# Patient Record
Sex: MALE | Race: WHITE | Employment: OTHER | URBAN - METROPOLITAN AREA
[De-identification: names, ages, dates, MRNs, and addresses within clinical notes are randomized per-mention and may not be internally consistent; named-entity substitution may affect disease eponyms.]

---

## 2020-12-14 ENCOUNTER — HOSPITAL ENCOUNTER (OUTPATIENT)
Age: 75
Setting detail: SPECIMEN
Discharge: HOME | End: 2020-12-14
Payer: MEDICARE

## 2020-12-14 ENCOUNTER — OFFICE VISIT (OUTPATIENT)
Dept: FAMILY MEDICINE CLINIC | Age: 75
End: 2020-12-14
Payer: MEDICARE

## 2020-12-14 VITALS
SYSTOLIC BLOOD PRESSURE: 132 MMHG | DIASTOLIC BLOOD PRESSURE: 78 MMHG | TEMPERATURE: 97.5 F | HEIGHT: 71 IN | BODY MASS INDEX: 25.62 KG/M2 | WEIGHT: 183 LBS | OXYGEN SATURATION: 97 % | RESPIRATION RATE: 18 BRPM | HEART RATE: 75 BPM

## 2020-12-14 LAB
ABSOLUTE EOS #: 0.19 K/UL (ref 0–0.44)
ABSOLUTE IMMATURE GRANULOCYTE: 0.03 K/UL (ref 0–0.3)
ABSOLUTE LYMPH #: 1.42 K/UL (ref 1.1–3.7)
ABSOLUTE MONO #: 0.73 K/UL (ref 0.1–1.2)
ALBUMIN SERPL-MCNC: 4.1 G/DL (ref 3.5–5.2)
ALBUMIN/GLOBULIN RATIO: 1.4 (ref 1–2.5)
ALP BLD-CCNC: 68 U/L (ref 40–129)
ALT SERPL-CCNC: 12 U/L (ref 5–41)
ANION GAP SERPL CALCULATED.3IONS-SCNC: 9 MMOL/L (ref 9–17)
AST SERPL-CCNC: 13 U/L
BASOPHILS # BLD: 1 % (ref 0–2)
BASOPHILS ABSOLUTE: 0.07 K/UL (ref 0–0.2)
BILIRUB SERPL-MCNC: 0.52 MG/DL (ref 0.3–1.2)
BUN BLDV-MCNC: 21 MG/DL (ref 8–23)
BUN/CREAT BLD: ABNORMAL (ref 9–20)
C-PEPTIDE: 2.3 NG/ML (ref 1.1–4.4)
CALCIUM SERPL-MCNC: 9.7 MG/DL (ref 8.6–10.4)
CHLORIDE BLD-SCNC: 102 MMOL/L (ref 98–107)
CHOLESTEROL, FASTING: 168 MG/DL
CHOLESTEROL/HDL RATIO: 3.5
CO2: 27 MMOL/L (ref 20–31)
CREAT SERPL-MCNC: 0.89 MG/DL (ref 0.7–1.2)
DIFFERENTIAL TYPE: ABNORMAL
EOSINOPHILS RELATIVE PERCENT: 3 % (ref 1–4)
ESTIMATED AVERAGE GLUCOSE: 192 MG/DL
GFR AFRICAN AMERICAN: >60 ML/MIN
GFR NON-AFRICAN AMERICAN: >60 ML/MIN
GFR SERPL CREATININE-BSD FRML MDRD: ABNORMAL ML/MIN/{1.73_M2}
GFR SERPL CREATININE-BSD FRML MDRD: ABNORMAL ML/MIN/{1.73_M2}
GLUCOSE BLD-MCNC: 167 MG/DL (ref 70–99)
HAV IGM SER IA-ACNC: NONREACTIVE
HBA1C MFR BLD: 8.3 % (ref 4–6)
HCT VFR BLD CALC: 48.3 % (ref 40.7–50.3)
HDLC SERPL-MCNC: 48 MG/DL
HEMOGLOBIN: 15.1 G/DL (ref 13–17)
HEPATITIS B CORE IGM ANTIBODY: NONREACTIVE
HEPATITIS B SURFACE ANTIGEN: NONREACTIVE
HEPATITIS C ANTIBODY: NONREACTIVE
IMMATURE GRANULOCYTES: 1 %
LDL CHOLESTEROL: 106 MG/DL (ref 0–130)
LYMPHOCYTES # BLD: 22 % (ref 24–43)
MCH RBC QN AUTO: 30 PG (ref 25.2–33.5)
MCHC RBC AUTO-ENTMCNC: 31.3 G/DL (ref 28.4–34.8)
MCV RBC AUTO: 96 FL (ref 82.6–102.9)
MONOCYTES # BLD: 11 % (ref 3–12)
NRBC AUTOMATED: 0 PER 100 WBC
PDW BLD-RTO: 12.7 % (ref 11.8–14.4)
PLATELET # BLD: 275 K/UL (ref 138–453)
PLATELET ESTIMATE: ABNORMAL
PMV BLD AUTO: 11.1 FL (ref 8.1–13.5)
POTASSIUM SERPL-SCNC: 5.7 MMOL/L (ref 3.7–5.3)
PROSTATE SPECIFIC ANTIGEN: 4.18 UG/L
RBC # BLD: 5.03 M/UL (ref 4.21–5.77)
RBC # BLD: ABNORMAL 10*6/UL
SEG NEUTROPHILS: 62 % (ref 36–65)
SEGMENTED NEUTROPHILS ABSOLUTE COUNT: 4.15 K/UL (ref 1.5–8.1)
SODIUM BLD-SCNC: 138 MMOL/L (ref 135–144)
TOTAL PROTEIN: 7 G/DL (ref 6.4–8.3)
TRIGLYCERIDE, FASTING: 68 MG/DL
TSH SERPL DL<=0.05 MIU/L-ACNC: 1.32 MIU/L (ref 0.3–5)
VLDLC SERPL CALC-MCNC: NORMAL MG/DL (ref 1–30)
WBC # BLD: 6.6 K/UL (ref 3.5–11.3)
WBC # BLD: ABNORMAL 10*3/UL

## 2020-12-14 PROCEDURE — 3017F COLORECTAL CA SCREEN DOC REV: CPT | Performed by: INTERNAL MEDICINE

## 2020-12-14 PROCEDURE — 1036F TOBACCO NON-USER: CPT | Performed by: INTERNAL MEDICINE

## 2020-12-14 PROCEDURE — 4040F PNEUMOC VAC/ADMIN/RCVD: CPT | Performed by: INTERNAL MEDICINE

## 2020-12-14 PROCEDURE — G8417 CALC BMI ABV UP PARAM F/U: HCPCS | Performed by: INTERNAL MEDICINE

## 2020-12-14 PROCEDURE — G8427 DOCREV CUR MEDS BY ELIG CLIN: HCPCS | Performed by: INTERNAL MEDICINE

## 2020-12-14 PROCEDURE — 1123F ACP DISCUSS/DSCN MKR DOCD: CPT | Performed by: INTERNAL MEDICINE

## 2020-12-14 PROCEDURE — 3046F HEMOGLOBIN A1C LEVEL >9.0%: CPT | Performed by: INTERNAL MEDICINE

## 2020-12-14 PROCEDURE — 99203 OFFICE O/P NEW LOW 30 MIN: CPT | Performed by: INTERNAL MEDICINE

## 2020-12-14 PROCEDURE — 2022F DILAT RTA XM EVC RTNOPTHY: CPT | Performed by: INTERNAL MEDICINE

## 2020-12-14 PROCEDURE — G8482 FLU IMMUNIZE ORDER/ADMIN: HCPCS | Performed by: INTERNAL MEDICINE

## 2020-12-14 RX ORDER — SIMVASTATIN 20 MG
20 TABLET ORAL NIGHTLY
COMMUNITY
End: 2021-03-02 | Stop reason: SDUPTHER

## 2020-12-14 RX ORDER — PIOGLITAZONEHYDROCHLORIDE 45 MG/1
45 TABLET ORAL DAILY
COMMUNITY
End: 2020-12-29 | Stop reason: ALTCHOICE

## 2020-12-14 RX ORDER — CANAGLIFLOZIN 300 MG/1
300 TABLET, FILM COATED ORAL
COMMUNITY
End: 2020-12-29

## 2020-12-14 RX ORDER — GABAPENTIN 300 MG/1
300 CAPSULE ORAL 2 TIMES DAILY
COMMUNITY
End: 2021-03-02 | Stop reason: SDUPTHER

## 2020-12-14 ASSESSMENT — ENCOUNTER SYMPTOMS
VOMITING: 0
ANAL BLEEDING: 0
RECTAL PAIN: 0
BACK PAIN: 0
CHEST TIGHTNESS: 0
BLURRED VISION: 0
CONSTIPATION: 0
DIARRHEA: 0
VOICE CHANGE: 0
NAUSEA: 0
TROUBLE SWALLOWING: 0
BLOOD IN STOOL: 0
WHEEZING: 0
STRIDOR: 0
ABDOMINAL DISTENTION: 0
ABDOMINAL PAIN: 0
SHORTNESS OF BREATH: 0

## 2020-12-14 ASSESSMENT — PATIENT HEALTH QUESTIONNAIRE - PHQ9
SUM OF ALL RESPONSES TO PHQ9 QUESTIONS 1 & 2: 0
SUM OF ALL RESPONSES TO PHQ QUESTIONS 1-9: 0
2. FEELING DOWN, DEPRESSED OR HOPELESS: 0
SUM OF ALL RESPONSES TO PHQ QUESTIONS 1-9: 0
SUM OF ALL RESPONSES TO PHQ QUESTIONS 1-9: 0
1. LITTLE INTEREST OR PLEASURE IN DOING THINGS: 0

## 2020-12-14 NOTE — PROGRESS NOTES
Visit Information    Have you changed or started any medications since your last visit including any over-the-counter medicines, vitamins, or herbal medicines? no   Are you having any side effects from any of your medications? -  no  Have you stopped taking any of your medications? Is so, why? -  no    Have you seen any other physician or provider since your last visit? No  Have you had any other diagnostic tests since your last visit? No  Have you been seen in the emergency room and/or had an admission to a hospital since we last saw you? No  Have you had your routine dental cleaning in the past 6 months? no    Have you activated your [employer] account? If not, what are your barriers?  No:      Patient Care Team:  Gabriel Del Toro DO as PCP - General (Family Medicine)    Medical History Review  Past Medical, Family, and Social History reviewed and does contribute to the patient presenting condition    Health Maintenance   Topic Date Due    AAA screen  1945    Hepatitis C screen  1945    Lipid screen  08/09/1955    DTaP/Tdap/Td vaccine (1 - Tdap) 08/09/1964    Shingles Vaccine (1 of 2) 08/09/1995    Colon cancer screen colonoscopy  08/09/1995    Pneumococcal 65+ years Vaccine (2 of 2 - PPSV23) 08/09/2010    Flu vaccine (1) 09/01/2020    Hepatitis A vaccine  Aged Out    Hepatitis B vaccine  Aged Out    Hib vaccine  Aged Out    Meningococcal (ACWY) vaccine  Aged Out

## 2020-12-14 NOTE — PROGRESS NOTES
[facility]  [address]  [facility] [address] [address]  Dept: [phone]  Dept Fax: [phone]    Lit Miller a 76 y.o. male who presents today for his medical conditions/complaints as notedbelow. Kristy Lopez is c/o of   Chief Complaint   Patient presents with    New Patient     was seeing Dr. Reyes [facility] Diabetes     last A1C was with the [facility]     on back of neck. would like it checked    Results     discuss MRI done- on care everywhere in NOV    Weight Loss     235 about a year ago- has not tried to lose weight     HPI:     Here to establish care  Used to see dr. Jin Cool     Hx of DM, HTN   No hx of cva or mi   Is due for blood work  [employer] once a year to [facility] but does nto get routine care from them   Denies needing refills  No cardiac sxs  Takes meds daily    Has lost about 50 lbs in the last 6-9 months w/o trying  No change to diet  States eating about the same /appeitite did not decrease etc  Non of his meds are new in the last year or changes   No urinary sxs  No constitutional sxs; no gi sxs  Had cscope about 5 yrs ago and normal but need to get records   Previous pcp just told him to eat more protein per patient , he has been trying    Also had neck pain after fixing a light  Had mri cervical in nov 2020 and did nto get results  In care everywhere  Showed cervical stenosis to all levels severe at c2-c4 and disc bulge to some levels  He was given order /referral for ortho spine dr Nimco Johnson but could not keep appt  Pain does not affect adls, comes and goes  Only occasional neuropathy sxs  No weakness to arms   Last pcp also gave MR which does help a lot hwne he takes   Does not want any more referrals at this time     Diabetes  He presents for his initial diabetic visit. He has type 2 diabetes mellitus. His disease course has been stable. There are no hypoglycemic associated symptoms.  Pertinent negatives for hypoglycemia include no dizziness, headaches, nervousness/anxiousness, speech difficulty or tremors. Associated symptoms include weight loss. Pertinent negatives for diabetes include no blurred vision, no chest pain, no fatigue, no foot paresthesias, no foot ulcerations, no polydipsia, no polyphagia, no polyuria and no weakness. There are no hypoglycemic complications. Symptoms are stable. Diabetic complications include peripheral neuropathy. Pertinent negatives for diabetic complications include no impotence, nephropathy, PVD or retinopathy. Risk factors for coronary artery disease include male sex, obesity, family history, dyslipidemia, diabetes mellitus, sedentary lifestyle and hypertension. Current diabetic treatment includes oral agent (triple therapy). He is compliant with treatment most of the time. His weight is decreasing steadily. He is following a generally healthy diet. Meal planning includes avoidance of concentrated sweets. He has not had a previous visit with a dietitian. He rarely participates in exercise. His home blood glucose trend is fluctuating minimally. His breakfast blood glucose range is generally 130-140 mg/dl. His lunch blood glucose range is generally 130-140 mg/dl. His dinner blood glucose range is generally 130-140 mg/dl. An ACE inhibitor/angiotensin II receptor blocker is not being taken. He does not see a podiatrist.Eye exam is current.        No results found for: LABA1C      ( goal A1C is < 7)   No results found for: LABMICR  No results found for: LDLCHOLESTEROL, LDLCALC    (goal LDL is <100)   No results found for: AST, ALT, BUN  BP Readings from Last 3 Encounters:   12/14/20 132/78          (goal 120/80)    Past Medical History:   Diagnosis Date    Depression     Neuropathy       Past Surgical History:   Procedure Laterality Date    KNEE SURGERY         Family History   Problem Relation Age of Onset    Cancer Mother     Emphysema Father     Diabetes Brother     High Cholesterol Brother     Cancer Brother        Social History     Tobacco Use    Smoking status: Former Smoker     Packs/day: 1.00     Types: Cigarettes     Quit date:      Years since quittin.9    Smokeless tobacco: Never Used   Substance Use Topics    Alcohol use: Not Currently      Current Outpatient Medications   Medication Sig Dispense Refill    sitaGLIPtan-metFORMIN (JANUMET)  MG per tablet Janumet  MG Oral Tablet  TAKE 1 TABLET TWICE DAILY WITH MEALS. Refills: 0    Active      canagliflozin (INVOKANA) 300 MG TABS tablet Take 300 mg by mouth every morning (before breakfast)      pioglitazone (ACTOS) 45 MG tablet Take 45 mg by mouth daily      simvastatin (ZOCOR) 20 MG tablet Take 20 mg by mouth nightly      gabapentin (NEURONTIN) 300 MG capsule Take 300 mg by mouth 2 times daily. No current facility-administered medications for this visit. No Known Allergies       Health Maintenance   Topic Date Due    AAA screen  1945    Lipid screen  1955    DTaP/Tdap/Td vaccine (1 - Tdap) 1964    Shingles Vaccine (1 of 2) 1995    Colon cancer screen colonoscopy  1995    Flu vaccine  Completed    Pneumococcal 65+ years Vaccine  Completed    Hepatitis A vaccine  Aged Out    Hepatitis B vaccine  Aged Out    Hib vaccine  Aged Out    Meningococcal (ACWY) vaccine  Aged Out    Hepatitis C screen  Discontinued       Subjective:     Review of Systems   Constitutional: Positive for unexpected weight change and weight loss. Negative for activity change, appetite change, chills, diaphoresis, fatigue and fever. HENT: Negative for trouble swallowing and voice change. Eyes: Negative for blurred vision and visual disturbance. Respiratory: Negative for chest tightness, shortness of breath, wheezing and stridor. Cardiovascular: Negative for chest pain, palpitations and leg swelling.    Gastrointestinal: Negative for abdominal distention, abdominal pain, anal bleeding, blood in stool, constipation, diarrhea, nausea, rectal pain and vomiting. Endocrine: Negative for cold intolerance, heat intolerance, polydipsia, polyphagia and polyuria. Genitourinary: Negative for decreased urine volume, dysuria, frequency, genital sores, hematuria, impotence, testicular pain and urgency. Musculoskeletal: Positive for arthralgias, neck pain and neck stiffness. Negative for back pain, gait problem, joint swelling and myalgias. Skin: Negative for rash. Allergic/Immunologic: Negative for immunocompromised state. Neurological: Positive for numbness. Negative for dizziness, tremors, syncope, speech difficulty, weakness, light-headedness and headaches. Hematological: Negative for adenopathy. Does not bruise/bleed easily. Psychiatric/Behavioral: Negative for self-injury, sleep disturbance and suicidal ideas. The patient is not nervous/anxious. Objective:      Physical Exam  Vitals signs and nursing note reviewed. Constitutional:       General: He is not in acute distress. Appearance: Normal appearance. He is well-developed. He is not ill-appearing, toxic-appearing or diaphoretic. Comments:      HENT:      Head: Normocephalic and atraumatic. Right Ear: Tympanic membrane, ear canal and external ear normal. There is impacted cerumen. Left Ear: Tympanic membrane, ear canal and external ear normal. There is impacted cerumen. Mouth/Throat:      Mouth: Mucous membranes are dry. Eyes:      Extraocular Movements: Extraocular movements intact. Conjunctiva/sclera: Conjunctivae normal.      Pupils: Pupils are equal, round, and reactive to light. Neck:      Musculoskeletal: Normal range of motion and neck supple. No neck rigidity. Thyroid: No thyroid mass or thyroid tenderness. Vascular: No carotid bruit. Cardiovascular:      Rate and Rhythm: Normal rate and regular rhythm. No extrasystoles are present.      Pulses: Normal pulses. Heart sounds: Normal heart sounds, S1 normal and S2 normal. Heart sounds not distant. No murmur. Pulmonary:      Effort: Pulmonary effort is normal. No bradypnea, accessory muscle usage, prolonged expiration, respiratory distress or retractions. Breath sounds: Normal breath sounds and air entry. No stridor, decreased air movement or transmitted upper airway sounds. No decreased breath sounds, wheezing, rhonchi or rales. Abdominal:      General: Bowel sounds are normal. There is no distension. Palpations: Abdomen is soft. There is no hepatomegaly, splenomegaly or mass. Tenderness: There is no abdominal tenderness. There is no right CVA tenderness, left CVA tenderness, guarding or rebound. Hernia: No hernia is present. Musculoskeletal:      Right shoulder: He exhibits no tenderness, no bony tenderness, no pain, no spasm, normal pulse and normal strength. Left knee: He exhibits normal range of motion, no swelling, no effusion, no ecchymosis, no deformity, no laceration and no erythema. No tenderness found. Lumbar back: He exhibits spasm. He exhibits normal range of motion, no tenderness, no bony tenderness, no swelling, no edema, no deformity, no pain and normal pulse. Right lower leg: No edema. Left lower leg: No edema. Lymphadenopathy:      Cervical: No cervical adenopathy. Skin:     General: Skin is warm and dry. Capillary Refill: Capillary refill takes 2 to 3 seconds. Findings: No rash. Neurological:      General: No focal deficit present. Mental Status: He is alert and oriented to person, place, and time. Cranial Nerves: Cranial nerves are intact. No cranial nerve deficit. Sensory: Sensory deficit present. Motor: Motor function is intact. No weakness, atrophy or abnormal muscle tone. Coordination: Coordination is intact.  Coordination normal.      Gait: Gait normal.   Psychiatric:         Mood and Affect: Mood conversion to type 1 DM? Last a1c per pt was overall okay though he cannot remember number and has been awhile; no other DM sxs  Denied need for refills    Reviewed MRI  Continue current tx  Get records from old pcp including cscope     Pt will return for shave biopsy to posterior neck in 1-2 weeks for abnormal mold   Call with q/c  Seek immediate medical attention for any chest pain , severe trouble breathing and/or visual changes. Patientgiven educational materials - see patient instructions. Discussed use, benefit,and side effects of prescribed medications. All patient questions answered. Ptvoiced understanding. Reviewed health maintenance. Instructed to continue currentmedications, diet and exercise. Patient agreed with treatment plan. Follow up asdirected.      Electronically signed by Luis Alfredo Wood DO on 12/14/2020 at 11:23 AM

## 2020-12-15 LAB
THYROGLOBULIN AB: <20 IU/ML (ref 0–40)
THYROID PEROXIDASE (TPO) AB: <10 IU/ML (ref 0–35)

## 2020-12-29 ENCOUNTER — OFFICE VISIT (OUTPATIENT)
Dept: FAMILY MEDICINE CLINIC | Age: 75
End: 2020-12-29
Payer: MEDICARE

## 2020-12-29 VITALS
TEMPERATURE: 97.5 F | SYSTOLIC BLOOD PRESSURE: 120 MMHG | RESPIRATION RATE: 18 BRPM | OXYGEN SATURATION: 98 % | BODY MASS INDEX: 26.04 KG/M2 | DIASTOLIC BLOOD PRESSURE: 72 MMHG | HEIGHT: 71 IN | WEIGHT: 186 LBS | HEART RATE: 66 BPM

## 2020-12-29 DIAGNOSIS — R63.4 WEIGHT LOSS: ICD-10-CM

## 2020-12-29 DIAGNOSIS — Z23 NEED FOR PROPHYLACTIC VACCINATION AND INOCULATION AGAINST VARICELLA: ICD-10-CM

## 2020-12-29 DIAGNOSIS — Z91.81 AT HIGH RISK FOR FALLS: ICD-10-CM

## 2020-12-29 DIAGNOSIS — Z12.11 SCREENING FOR COLON CANCER: ICD-10-CM

## 2020-12-29 DIAGNOSIS — R35.1 BENIGN PROSTATIC HYPERPLASIA WITH NOCTURIA: ICD-10-CM

## 2020-12-29 DIAGNOSIS — L98.9 SKIN LESION: Primary | ICD-10-CM

## 2020-12-29 DIAGNOSIS — N40.1 BENIGN PROSTATIC HYPERPLASIA WITH NOCTURIA: ICD-10-CM

## 2020-12-29 DIAGNOSIS — E11.638 TYPE 2 DIABETES MELLITUS WITH OTHER ORAL COMPLICATION, WITHOUT LONG-TERM CURRENT USE OF INSULIN (HCC): ICD-10-CM

## 2020-12-29 PROCEDURE — 1123F ACP DISCUSS/DSCN MKR DOCD: CPT | Performed by: INTERNAL MEDICINE

## 2020-12-29 PROCEDURE — 1036F TOBACCO NON-USER: CPT | Performed by: INTERNAL MEDICINE

## 2020-12-29 PROCEDURE — 4040F PNEUMOC VAC/ADMIN/RCVD: CPT | Performed by: INTERNAL MEDICINE

## 2020-12-29 PROCEDURE — 3017F COLORECTAL CA SCREEN DOC REV: CPT | Performed by: INTERNAL MEDICINE

## 2020-12-29 PROCEDURE — G8482 FLU IMMUNIZE ORDER/ADMIN: HCPCS | Performed by: INTERNAL MEDICINE

## 2020-12-29 PROCEDURE — G8417 CALC BMI ABV UP PARAM F/U: HCPCS | Performed by: INTERNAL MEDICINE

## 2020-12-29 PROCEDURE — G8427 DOCREV CUR MEDS BY ELIG CLIN: HCPCS | Performed by: INTERNAL MEDICINE

## 2020-12-29 PROCEDURE — 2022F DILAT RTA XM EVC RTNOPTHY: CPT | Performed by: INTERNAL MEDICINE

## 2020-12-29 PROCEDURE — 11305 SHAVE SKIN LESION 0.5 CM/<: CPT | Performed by: INTERNAL MEDICINE

## 2020-12-29 PROCEDURE — 99214 OFFICE O/P EST MOD 30 MIN: CPT | Performed by: INTERNAL MEDICINE

## 2020-12-29 PROCEDURE — 3052F HG A1C>EQUAL 8.0%<EQUAL 9.0%: CPT | Performed by: INTERNAL MEDICINE

## 2020-12-29 RX ORDER — MELOXICAM 7.5 MG/1
7.5 TABLET ORAL DAILY
COMMUNITY
End: 2021-01-19 | Stop reason: SDUPTHER

## 2020-12-29 RX ORDER — GLIMEPIRIDE 2 MG/1
4 TABLET ORAL EVERY MORNING
Qty: 180 TABLET | Refills: 5 | Status: SHIPPED | OUTPATIENT
Start: 2020-12-29 | End: 2021-01-04 | Stop reason: SDUPTHER

## 2020-12-29 RX ORDER — TAMSULOSIN HYDROCHLORIDE 0.4 MG/1
0.4 CAPSULE ORAL DAILY
Qty: 90 CAPSULE | Refills: 11 | Status: SHIPPED | OUTPATIENT
Start: 2020-12-29 | End: 2021-01-04 | Stop reason: SDUPTHER

## 2020-12-29 NOTE — PROGRESS NOTES
Visit Information    Have you changed or started any medications since your last visit including any over-the-counter medicines, vitamins, or herbal medicines? no   Are you having any side effects from any of your medications? -  no  Have you stopped taking any of your medications? Is so, why? -  no    Have you seen any other physician or provider since your last visit? No  Have you had any other diagnostic tests since your last visit? No  Have you been seen in the emergency room and/or had an admission to a hospital since we last saw you? No  Have you had your routine dental cleaning in the past 6 months? no    Have you activated your [employer] account? If not, what are your barriers?  No:      Patient Care Team:  Anju Ann DO as PCP - General (Family Medicine)  Anju Ann [facility]CP [facility]    Medical History Review  Past Medical, Family, and Social History reviewed and does contribute to the patient presenting condition    Health Maintenance   Topic Date Due    AAA screen  1945    Diabetic foot exam  08/09/1955    Diabetic retinal exam  08/09/1955    DTaP/Tdap/Td vaccine (1 - Tdap) 08/09/1964    Shingles Vaccine (1 of 2) 08/09/1995    Colon cancer screen colonoscopy  08/09/1995    Annual Wellness Visit (AWV)  12/14/2020    A1C test (Diabetic or Prediabetic)  12/14/2021    Lipid screen  12/14/2021    PSA counseling  12/14/2021    Flu vaccine  Completed    Pneumococcal 65+ years Vaccine  Completed    Hepatitis A vaccine  Aged Out    Hib vaccine  Aged Out    Meningococcal (ACWY) vaccine  Aged Out    Hepatitis C screen  Discontinued

## 2021-01-02 ASSESSMENT — ENCOUNTER SYMPTOMS
VOMITING: 0
DIARRHEA: 0
COLOR CHANGE: 0
NAUSEA: 0
ABDOMINAL PAIN: 0
CONSTIPATION: 0

## 2021-01-02 NOTE — PROGRESS NOTES
[facility]  [address] [place] [address]  Dept: [phone]  Dept Fax: [phone]    Marilyn Barfield a 76 y.o. male who presents today for his medical conditions/complaints as notedbelow. Kristina Howell is c/o of   Chief Complaint   Patient presents with    Mole    Medication Refill       HPI:     Wound Check  He was originally treated more than 14 days ago. His temperature was unmeasured prior to arrival. The temperature was taken using an axillary reading. There has been no drainage from the wound. There is no redness present. There is no swelling present. There is no pain present. Benign Prostatic Hypertrophy  This is a recurrent problem. The current episode started more than 1 month ago. The problem has been gradually worsening since onset. Irritative symptoms include frequency and nocturia. Irritative symptoms do not include urgency. Obstructive symptoms include dribbling and incomplete emptying. Pertinent negatives include no chills, dysuria, genital pain, hematuria, hesitancy, nausea or vomiting. Past treatments include nothing. The treatment provided no relief.        Hemoglobin A1C (%)   Date Value   12/14/2020 8.3 (H)         ( goal A1C is < 7)   No results found for: LABMICR  LDL Cholesterol (mg/dL)   Date Value   12/14/2020 106       (goal LDL is <100)   AST (U/L)   Date Value   12/14/2020 13     ALT (U/L)   Date Value   12/14/2020 12     BUN (mg/dL)   Date Value   12/14/2020 21     BP Readings from Last 3 Encounters:   12/29/20 120/72   12/14/20 132/78          (goal 120/80)    Past Medical History:   Diagnosis Date    Depression     Neuropathy       Past Surgical History:   Procedure Laterality Date    KNEE SURGERY         Family History   Problem Relation Age of Onset    Cancer Mother     Emphysema Father     Diabetes Brother     High Cholesterol Brother     Cancer Brother        Social History     Tobacco Use    Smoking status: Former Smoker     Packs/day: 1.00     Types: Cigarettes     Quit date:      Years since quittin.0    Smokeless tobacco: Never Used   Substance Use Topics    Alcohol use: Not Currently      Current Outpatient Medications   Medication Sig Dispense Refill    meloxicam (MOBIC) 7.5 MG tablet Take 7.5 mg by mouth daily      zoster recombinant adjuvanted vaccine (SHINGRIX) 50 MCG/0.5ML SUSR injection 50 MCG IM then repeat 2-6 months. 0.5 mL 1    glimepiride (AMARYL) 2 MG tablet Take 2 tablets by mouth every morning 180 tablet 5    tamsulosin (FLOMAX) 0.4 MG capsule Take 1 capsule by mouth daily 90 capsule 11    sitaGLIPtan-metFORMIN (JANUMET)  MG per tablet Janumet  MG Oral Tablet TAKE 1 TABLET TWICE DAILY WITH MEALS. Refills: 0 Active 180 tablet 5    simvastatin (ZOCOR) 20 MG tablet Take 20 mg by mouth nightly      gabapentin (NEURONTIN) 300 MG capsule Take 300 mg by mouth 2 times daily. No current facility-administered medications for this visit. No Known Allergies       Health Maintenance   Topic Date Due    AAA screen  1945    Diabetic retinal exam  1955    DTaP/Tdap/Td vaccine (1 - Tdap) 1964    Shingles Vaccine (1 of 2) 1995    Colon cancer screen colonoscopy  1995    Annual Wellness Visit (AWV)  2020    A1C test (Diabetic or Prediabetic)  2021    Lipid screen  2021    PSA counseling  2021    Diabetic foot exam  2021    Flu vaccine  Completed    Pneumococcal 65+ years Vaccine  Completed    Hepatitis A vaccine  Aged Out    Hib vaccine  Aged Out    Meningococcal (ACWY) vaccine  Aged Out    Hepatitis C screen  Discontinued       Subjective:     Review of Systems   Constitutional: Positive for activity change and unexpected weight change. Negative for appetite change, chills, diaphoresis, fatigue and fever. Eyes: Negative for visual disturbance.    Cardiovascular: Negative for chest pain.   Gastrointestinal: Negative for abdominal pain, constipation, diarrhea, nausea and vomiting. Genitourinary: Positive for difficulty urinating, frequency, incomplete emptying and nocturia. Negative for decreased urine volume, dysuria, enuresis, flank pain, genital sores, hematuria, hesitancy, penile pain, penile swelling, scrotal swelling, testicular pain and urgency. Musculoskeletal: Negative for arthralgias. Skin: Positive for wound. Negative for color change, pallor and rash. Allergic/Immunologic: Positive for immunocompromised state. Neurological: Negative for headaches. Psychiatric/Behavioral: Negative for sleep disturbance. Objective:      Physical Exam  Vitals signs and nursing note reviewed. Constitutional:       General: He is not in acute distress. Appearance: Normal appearance. He is normal weight. He is not ill-appearing, toxic-appearing or diaphoretic. Comments: Chronically ill    HENT:      Head: Normocephalic and atraumatic. Neck:      Musculoskeletal: Normal range of motion and neck supple. Cardiovascular:      Rate and Rhythm: Normal rate and regular rhythm. Pulmonary:      Effort: Pulmonary effort is normal. No respiratory distress. Breath sounds: Normal breath sounds. No stridor. No wheezing or rhonchi. Skin:     General: Skin is warm and dry. Coloration: Skin is not ashen, cyanotic, jaundiced, mottled, pale or sallow. Findings: Lesion present. Nails: There is no clubbing. Neurological:      General: No focal deficit present. Mental Status: He is alert and oriented to person, place, and time. Cranial Nerves: No cranial nerve deficit. Psychiatric:         Mood and Affect: Mood normal.         Thought Content:  Thought content normal.     SUBJECTIVE:   Rachana Freire is a 76 y.o. male who presents for lesion removal. We have already discussed this procedure, including option of not performing surgery, technique of surgery and potential for scarring at a recent visit. OBJECTIVE:   Patient appears well. Vitals are normal.  Skin: itchy , brown spot , raised to posterior neck     ASSESSMENT:   normal complete skin exam, no suspicious lesions, actinic keratosis    PLAN:   After informed consent was obtained, using Betadine for cleansing and 1% Lidocaine without epinephrine for anesthetic, with sterile technique, shave excision was performed. Antibiotic dressing is applied, and wound care instructions provided. Be alert for any signs of cutaneous infection. The procedure was well tolerated without complications. Follow up: the specimen is labeled and sent to pathology for evaluation. /72   Pulse 66   Temp 97.5 °F (36.4 °C) (Temporal)   Resp 18   Ht 5' 11\" (1.803 m)   Wt 186 lb (84.4 kg)   SpO2 98%   BMI 25.94 kg/m²     Assessment:       Diagnosis Orders   1. Skin lesion  Surgical Pathology   2. Need for prophylactic vaccination and inoculation against varicella  zoster recombinant adjuvanted vaccine (SHINGRIX) 50 MCG/0.5ML SUSR injection   3. Type 2 diabetes mellitus with other oral complication, without long-term current use of insulin (HCC)  [facility] DIABETES FOOT EXAM   4. Screening for colon cancer  Cologuard             Plan:       Return in about 6 weeks (around 2/9/2021), or if symptoms worsen or fail to improve, for urination check . Orders Placed This Encounter   Procedures    Cologuard     This test is performed by an external laboratory and is used for result attachment only. It is required that this order requisition be faxed to: [facility] @@ [phone]. See [email or web address] for further information.      Standing Status:   Future     Standing Expiration Date:   12/29/2021   [facility]     Standing Status:   Future     Standing Expiration Date:   12/29/2021     Order Specific Question:   PREVIOUS BIOPSY     Answer:   No     Order Specific Question:   PREOP DIAGNOSIS     Answer: atypical mole     Order Specific Question:   FROZEN SECTION - NO OR YES/SPECIMEN     Answer:   No    HM DIABETES FOOT EXAM     Orders Placed This Encounter   Medications    zoster recombinant adjuvanted vaccine (SHINGRIX) 50 MCG/0.5ML SUSR injection     Si MCG IM then repeat 2-6 months. Dispense:  0.5 mL     Refill:  1    glimepiride (AMARYL) 2 MG tablet     Sig: Take 2 tablets by mouth every morning     Dispense:  180 tablet     Refill:  5    tamsulosin (FLOMAX) 0.4 MG capsule     Sig: Take 1 capsule by mouth daily     Dispense:  90 capsule     Refill:  11    sitaGLIPtan-metFORMIN (JANUMET)  MG per tablet     Sig: Janumet  MG Oral Tablet TAKE 1 TABLET TWICE DAILY WITH MEALS. Refills: 0 Active     Dispense:  180 tablet     Refill:  5    removal of skin lesion   Post procedure instructions provided will call with results     Trial flomax once daily  Reviewed SE  F/u in 4-6 weeks for med check   RECHECK PSA ( as borderline elevation) IN 2-3 MONTHS , RECHECK A1C AT THAT TIME AS WELL     Reviewed labs   Stop invokana   Pt was already off actos for months for DM  a1c was 8.6 percent  ?oissble cause of weight loss   Will restart amaryl at 4 mg daily   Reviewed SE  Monitor /keep log of sugars     For HM : colog[place]in[place], shingles vaccine   Call with q/c       Patientgiven educational materials - see patient instructions. Discussed use, benefit,and side effects of prescribed medications. All patient questions answered. Ptvoiced understanding. Reviewed health maintenance. Instructed to continue currentmedications, diet and exercise. Patient agreed with treatment plan. Follow up asdirected. Electronically signed by Boston Sorensen DO on 2021 at 4:45 PM  On the basis of positive falls risk screening, assessment and plan is as follows: home safety tips provided.

## 2021-01-04 RX ORDER — GLIMEPIRIDE 2 MG/1
4 TABLET ORAL EVERY MORNING
Qty: 180 TABLET | Refills: 5 | Status: SHIPPED | OUTPATIENT
Start: 2021-01-04 | End: 2021-06-28 | Stop reason: SDUPTHER

## 2021-01-04 RX ORDER — TAMSULOSIN HYDROCHLORIDE 0.4 MG/1
0.4 CAPSULE ORAL DAILY
Qty: 90 CAPSULE | Refills: 11 | Status: SHIPPED | OUTPATIENT
Start: 2021-01-04 | End: 2021-08-05 | Stop reason: SDUPTHER

## 2021-01-05 DIAGNOSIS — L98.9 SKIN LESION: ICD-10-CM

## 2021-01-12 ENCOUNTER — TELEPHONE (OUTPATIENT)
Dept: FAMILY MEDICINE CLINIC | Age: 76
End: 2021-01-12

## 2021-01-19 RX ORDER — MELOXICAM 7.5 MG/1
7.5 TABLET ORAL DAILY
Qty: 90 TABLET | Refills: 5 | Status: SHIPPED | OUTPATIENT
Start: 2021-01-19 | End: 2021-05-10 | Stop reason: SDUPTHER

## 2021-01-21 ENCOUNTER — NURSE TRIAGE (OUTPATIENT)
Dept: OTHER | Facility: CLINIC | Age: 76
End: 2021-01-21

## 2021-01-21 NOTE — TELEPHONE ENCOUNTER
Patient called pre-service center [facility]) [place] with red flag complaint. Brief description of triage: severe neck pain    Triage indicates for patient to see PCP today    Care advice provided, patient verbalizes understanding; denies any other questions or concerns; instructed to call back for any new or worsening symptoms.  was not allowed to make appointments for Dr. Agata Rader office and they were at lunch. Caller will call back for appointment after 13:00 today. Attention Provider: Thank you for allowing me to participate in the care of your patient. The patient was connected to triage in response to information provided to the [facility]. Please do not respond through this encounter as the response is not directed to a shared pool. Reason for Disposition   Patient wants to be seen    Answer Assessment - Initial Assessment Questions  1. ONSET: \"When did the pain begin? \"       About a month    2. LOCATION: \"Where does it hurt? \"       from the base of skull to shoulder attachment    3. PATTERN \"Does the pain come and go, or has it been constant since it started? \"       Constant    4. SEVERITY: \"How bad is the pain? \"  (Scale 1-10; or mild, moderate, severe)    - MILD (1-3): doesn't interfere with normal activities     - MODERATE (4-7): interferes with normal activities or awakens from sleep     - SEVERE (8-10):  excruciating pain, unable to do any normal activities       Severe 10     5. RADIATION: \"Does the pain go anywhere else, shoot into your arms? \"      Denies    6. CORD SYMPTOMS: \"Any weakness or numbness of the arms or legs? \"      Denies    7. CAUSE: \"What do you think is causing the neck pain? \"      Thinks the neck is misaligned    8. NECK OVERUSE: Mariire Natick recent activities that involved turning or twisting the neck? \"      Denies    9. OTHER SYMPTOMS: \"Do you have any other symptoms? \" (e.g., headache, fever, chest pain, difficulty breathing, neck swelling)      Headache from the severe pain    10. PREGNANCY: \"Is there any chance you are pregnant? \" \"When was your last menstrual period? \"        N/a    Protocols used: NECK PAIN OR STIFFNESS-ADULT-[place]

## 2021-03-02 DIAGNOSIS — M48.02 CERVICAL STENOSIS OF SPINAL CANAL: Primary | ICD-10-CM

## 2021-03-02 RX ORDER — SIMVASTATIN 20 MG
20 TABLET ORAL NIGHTLY
Qty: 30 TABLET | Refills: 0 | Status: SHIPPED | OUTPATIENT
Start: 2021-03-02 | End: 2021-04-13

## 2021-03-02 RX ORDER — GABAPENTIN 300 MG/1
300 CAPSULE ORAL 2 TIMES DAILY
Qty: 60 CAPSULE | Refills: 0 | Status: SHIPPED | OUTPATIENT
Start: 2021-03-02 | End: 2021-04-21 | Stop reason: SDUPTHER

## 2021-03-02 RX ORDER — LISINOPRIL 10 MG/1
10 TABLET ORAL DAILY
Qty: 30 TABLET | Refills: 0 | Status: SHIPPED | OUTPATIENT
Start: 2021-03-02 | End: 2021-08-27 | Stop reason: SDUPTHER

## 2021-03-02 RX ORDER — LISINOPRIL 10 MG/1
10 TABLET ORAL DAILY
COMMUNITY
End: 2021-03-02 | Stop reason: SDUPTHER

## 2021-03-08 ENCOUNTER — TELEPHONE (OUTPATIENT)
Dept: FAMILY MEDICINE CLINIC | Age: 76
End: 2021-03-08

## 2021-03-17 ENCOUNTER — VIRTUAL VISIT (OUTPATIENT)
Dept: FAMILY MEDICINE CLINIC | Age: 76
End: 2021-03-17
Payer: MEDICARE

## 2021-03-17 DIAGNOSIS — Z00.00 MEDICARE ANNUAL WELLNESS VISIT, INITIAL: Primary | ICD-10-CM

## 2021-03-17 PROCEDURE — G8482 FLU IMMUNIZE ORDER/ADMIN: HCPCS | Performed by: INTERNAL MEDICINE

## 2021-03-17 PROCEDURE — 4040F PNEUMOC VAC/ADMIN/RCVD: CPT | Performed by: INTERNAL MEDICINE

## 2021-03-17 PROCEDURE — G0438 PPPS, INITIAL VISIT: HCPCS | Performed by: INTERNAL MEDICINE

## 2021-03-17 PROCEDURE — 3017F COLORECTAL CA SCREEN DOC REV: CPT | Performed by: INTERNAL MEDICINE

## 2021-03-17 PROCEDURE — 1123F ACP DISCUSS/DSCN MKR DOCD: CPT | Performed by: INTERNAL MEDICINE

## 2021-03-17 ASSESSMENT — PATIENT HEALTH QUESTIONNAIRE - PHQ9
SUM OF ALL RESPONSES TO PHQ9 QUESTIONS 1 & 2: 0
1. LITTLE INTEREST OR PLEASURE IN DOING THINGS: 0
SUM OF ALL RESPONSES TO PHQ QUESTIONS 1-9: 0

## 2021-03-17 NOTE — PROGRESS NOTES
regularly involved in providing care):   Patient Care Team:  Faizan Nguyen DO as PCP - General (Family Medicine)  Faizan Nguyen DO as PCP - [facility] Empaneled Provider      The following problems were reviewed today and where indicated follow up appointments were made and/or referrals ordered.     Risk Factor Screenings with Interventions     Fall Risk:  2 or more falls in past year?: no  Fall with injury in past year?: no  Fall Risk Interventions:    · NA    Depression:  PHQ-2 Score: 0  Depression Interventions:  · NA    Anxiety:     Anxiety Interventions:  · NA    Cognitive:     Cognitive Impairment Interventions:  · NA    Substance Abuse:  Social History     Socioeconomic History    Marital status:      Spouse name: Not on file    Number of children: Not on file    Years of education: Not on file    Highest education level: Not on file   Occupational History    Not on file   Social Needs    Financial resource strain: Not on file    Food insecurity     Worry: Not on file     Inability: Not on file   FonJax needs     Medical: Not on file     Non-medical: Not on file   Tobacco Use    Smoking status: Former Smoker     Packs/day: 1.00     Years: 15.00     Pack years: 15.00     Types: Cigarettes     Start date: 1972     Quit date:      Years since quittin.2    Smokeless tobacco: Never Used   Substance and Sexual Activity    Alcohol use: Not Currently    Drug use: Never    Sexual activity: Not Currently   Lifestyle    Physical activity     Days per week: Not on file     Minutes per session: Not on file    Stress: Not on file   Relationships    Social connections     Talks on phone: Not on file     Gets together: Not on file     Attends Anglican service: Not on file     Active member of club or organization: Not on file     Attends meetings of clubs or organizations: Not on file     Relationship status: Not on file    Intimate partner violence     Fear of current or ex partner: Not on file     Emotionally abused: Not on file     Physically abused: Not on file     Forced sexual activity: Not on file   Other Topics Concern    Not on file   Social History Narrative    Not on file     Audit Questionnaire: Screen for Alcohol Misuse  How often do you have a drink containing alcohol?: Never  Substance Abuse Interventions:  · NA    Health Risk Assessment:     General  In general, how would you say your health is?: Good  In the past 7 days, have you experienced any of the following? New or Increased Pain, New or Increased Fatigue, Loneliness, Social Isolation, Stress or Anger?: None of These  Do you get the social and emotional support that you need?: Yes  Do you have a Living Will?: (!) No  General Health Risk Interventions:  · No Living Will: Patient declines ACP discussion/assistance  · NA    Health Habits/Nutrition  Do you exercise for at least 20 minutes 2-3 times per week?: Yes  Have you lost any weight without trying in the past 3 months?: No  Do you eat only one meal per day?: No  Have you seen the dentist within the past year?: N/A - wear dentures  There is no height or weight on file to calculate BMI.   Health Habits/Nutrition Interventions:  · NA    Hearing/Vision  Do you or your family notice any trouble with your hearing that hasn't been managed with hearing aids?: (!) Yes  Do you have difficulty driving, watching TV, or doing any of your daily activities because of your eyesight?: No  Have you had an eye exam within the past year?: Yes  Hearing/Vision Interventions:  · Hearing concerns:  PT HAS HEARING AIDS , neeeds new ones     Safety  Do you have working smoke detectors?: Yes  Have all throw rugs been removed or fastened?: Yes  Do you have non-slip mats or surfaces in all bathtubs/showers?: Yes  Do all of your stairways have a railing or banister?: Yes  Are your doorways, halls and stairs free of clutter?: Yes  Do you always fasten your seatbelt when you are in a car?: Yes  Safety Interventions:  · Home safety tips provided    ADLs  In the past 7 days, did you need help from others to perform any of the following everyday activities? Eating, dressing, grooming, bathing, toileting, or walking/balance?: None  In the past 7 days, did you need help from others to take care of any of the following?  Laundry, housekeeping, banking/finances, shopping, telephone use, food preparation, transportation, or taking medications?: None  ADL Interventions:  · Patient declines any further evaluation/treatment for this issue    Personalized Preventive Plan   Current Health Maintenance Status  Immunization History   Administered Date(s) Administered    COVID-19, Moderna, PF, 100mcg/0.5mL 01/29/2021, 02/26/2021    Influenza A ([id number]) Vaccine PF IM 01/06/2010    Influenza Virus Vaccine 09/16/2009, 12/08/2010, 01/16/2012, 10/18/2012, 11/18/2013, 11/22/2015, 10/01/2016, 10/10/2019, 10/01/2020    Influenza Whole 10/25/2008, 09/16/2009    Influenza, High Dose (Fluzone 65 yrs and older) 10/23/2014, 12/13/2016, 10/26/2017, 11/08/2018, 12/01/2020    Influenza, High-dose, Karol Saldivar, 65 yrs +, IM (Fluzone) 11/30/2020    Influenza, Triv, 3 Years and older, IM (Afluria (5 yrs and older) 10/18/2012, 11/18/2013    Pneumococcal Conjugate 13-valent ([id number]) 03/11/2015    Pneumococcal Polysaccharide ([id number]) 12/14/2015, 10/26/2017        Health Maintenance   Topic Date Due    AAA screen  Never done    Diabetic retinal exam  Never done    DTaP/Tdap/Td vaccine (1 - Tdap) Never done    Shingles Vaccine (1 of 2) Never done    Colon cancer screen colonoscopy  Never done    Annual Wellness Visit (AWV)  Never done    A1C test (Diabetic or Prediabetic)  12/14/2021    Lipid screen  12/14/2021    Potassium monitoring  12/14/2021    Creatinine monitoring  12/14/2021    PSA counseling  12/14/2021    Diabetic foot exam  12/29/2021    Flu vaccine  Completed    Pneumococcal 65+ years Vaccine  Completed    COVID-19 Vaccine  Completed    Hepatitis A vaccine  Aged Out    Hib vaccine  Aged Out    Meningococcal (ACWY) vaccine  Aged Out    Hepatitis C screen  Discontinued       Recommendations for Preventive Services Due: see orders.   Recommended screening schedule for the next 5-10 years is provided to the patient in written form: see Patient Instructions/AVS.

## 2021-04-13 RX ORDER — SIMVASTATIN 20 MG
TABLET ORAL
Qty: 30 TABLET | Refills: 11 | Status: SHIPPED | OUTPATIENT
Start: 2021-04-13 | End: 2021-07-10 | Stop reason: SDUPTHER

## 2021-04-21 DIAGNOSIS — M48.02 CERVICAL STENOSIS OF SPINAL CANAL: ICD-10-CM

## 2021-04-21 RX ORDER — GABAPENTIN 300 MG/1
300 CAPSULE ORAL 2 TIMES DAILY
Qty: 180 CAPSULE | Refills: 5 | Status: SHIPPED | OUTPATIENT
Start: 2021-04-21 | End: 2022-04-29

## 2021-05-10 RX ORDER — MELOXICAM 7.5 MG/1
7.5 TABLET ORAL DAILY
Qty: 90 TABLET | Refills: 5 | Status: SHIPPED | OUTPATIENT
Start: 2021-05-10 | End: 2021-08-05 | Stop reason: SDUPTHER

## 2021-06-07 ENCOUNTER — OFFICE VISIT (OUTPATIENT)
Dept: FAMILY MEDICINE CLINIC | Age: 76
End: 2021-06-07
Payer: MEDICARE

## 2021-06-07 VITALS
HEIGHT: 71 IN | OXYGEN SATURATION: 96 % | DIASTOLIC BLOOD PRESSURE: 70 MMHG | BODY MASS INDEX: 26.46 KG/M2 | WEIGHT: 189 LBS | HEART RATE: 66 BPM | RESPIRATION RATE: 18 BRPM | SYSTOLIC BLOOD PRESSURE: 116 MMHG

## 2021-06-07 DIAGNOSIS — E78.5 HYPERLIPIDEMIA, UNSPECIFIED HYPERLIPIDEMIA TYPE: ICD-10-CM

## 2021-06-07 DIAGNOSIS — E11.9 TYPE 2 DIABETES MELLITUS WITHOUT COMPLICATION, WITHOUT LONG-TERM CURRENT USE OF INSULIN (HCC): ICD-10-CM

## 2021-06-07 DIAGNOSIS — R41.3 MEMORY LOSS: Primary | ICD-10-CM

## 2021-06-07 DIAGNOSIS — I10 HYPERTENSION, UNSPECIFIED TYPE: ICD-10-CM

## 2021-06-07 PROBLEM — M15.9 GENERALIZED OSTEOARTHRITIS: Status: ACTIVE | Noted: 2021-06-07

## 2021-06-07 LAB — HBA1C MFR BLD: 6.2 %

## 2021-06-07 PROCEDURE — 83036 HEMOGLOBIN GLYCOSYLATED A1C: CPT | Performed by: NURSE PRACTITIONER

## 2021-06-07 PROCEDURE — 1036F TOBACCO NON-USER: CPT | Performed by: NURSE PRACTITIONER

## 2021-06-07 PROCEDURE — G8417 CALC BMI ABV UP PARAM F/U: HCPCS | Performed by: NURSE PRACTITIONER

## 2021-06-07 PROCEDURE — 1123F ACP DISCUSS/DSCN MKR DOCD: CPT | Performed by: NURSE PRACTITIONER

## 2021-06-07 PROCEDURE — 2022F DILAT RTA XM EVC RTNOPTHY: CPT | Performed by: NURSE PRACTITIONER

## 2021-06-07 PROCEDURE — 99214 OFFICE O/P EST MOD 30 MIN: CPT | Performed by: NURSE PRACTITIONER

## 2021-06-07 PROCEDURE — 4040F PNEUMOC VAC/ADMIN/RCVD: CPT | Performed by: NURSE PRACTITIONER

## 2021-06-07 PROCEDURE — G8427 DOCREV CUR MEDS BY ELIG CLIN: HCPCS | Performed by: NURSE PRACTITIONER

## 2021-06-07 PROCEDURE — 3044F HG A1C LEVEL LT 7.0%: CPT | Performed by: NURSE PRACTITIONER

## 2021-06-07 PROCEDURE — 3017F COLORECTAL CA SCREEN DOC REV: CPT | Performed by: NURSE PRACTITIONER

## 2021-06-07 RX ORDER — MEMANTINE HYDROCHLORIDE 5 MG/1
5 TABLET ORAL DAILY
Qty: 30 TABLET | Refills: 1 | Status: SHIPPED | OUTPATIENT
Start: 2021-06-07 | End: 2021-08-05 | Stop reason: SDUPTHER

## 2021-06-07 ASSESSMENT — ENCOUNTER SYMPTOMS
ABDOMINAL PAIN: 0
SHORTNESS OF BREATH: 0
SINUS PAIN: 0
COUGH: 0
DIARRHEA: 0
VOMITING: 0
BACK PAIN: 0
EYE PAIN: 0
SORE THROAT: 0
NAUSEA: 0

## 2021-06-07 NOTE — PATIENT INSTRUCTIONS
Patient Education        Noninsulin Medicines for Type 2 Diabetes: Care Instructions  Overview     There are different types of noninsulin medicines for diabetes. Each works in a different way. But they all help you control your blood sugar. Some types help your body make insulin to lower your blood sugar. Others lower how much insulin your body needs. Some can slow how fast your body digests sugars. And some can remove extra glucose through your urine. You may need to take more than one medicine for diabetes. Two or more medicines may work better to lower your blood sugar level than just one does. · Metformin. This lowers how much glucose your liver makes. And it helps you respond better to insulin. It also lowers the amount of stored sugar that your liver releases when you are not eating. · Sulfonylureas. These help your body release more insulin. Some work for many hours. They can cause low blood sugar if you don't eat as you planned. An example is glipizide. · Thiazolidinediones. These reduce the amount of blood glucose. They also help you respond better to insulin. An example is pioglitazone. · SGLT2 inhibitors. These help to remove extra glucose through your urine. They may also help some people lose weight. An example is ertugliflozin. · DPP-4 inhibitors. These help your body raise the level of insulin after you eat. They also help your body make less of a hormone that raises blood sugar. An example is alogliptin. · Incretin hormones (GLP-1 receptor agonists). These help your body make a protein that can raise your insulin level and make you less hungry. They're given as shots or pills. An example is semaglutide. · Meglitinides. These help your body release insulin. They also help slow how your body digests sugars. So they can keep your blood sugar from rising too fast after you eat. · Alpha-glucosidase inhibitors. These keep starches from breaking down.  This means that they lower the amount of glucose absorbed when you eat. They don't help your body make more insulin. So they will not cause low blood sugar unless you use them with other medicines for diabetes. Follow-up care is a key part of your treatment and safety. Be sure to make and go to all appointments, and call your doctor if you are having problems. It's also a good idea to know your test results and keep a list of the medicines you take. How can you care for yourself at home? · Eat a healthy diet. Get some exercise each day. This may help you to reduce how much medicine you need. · Do not take other prescription or over-the-counter medicines, vitamins, herbal products, or supplements without talking to your doctor first. Some medicines for type 2 diabetes can cause problems with other medicines or supplements. · Tell your doctor if you plan to get pregnant. Some of these drugs are not safe for pregnant women. · Be safe with medicines. Take your medicines exactly as prescribed. Meglitinides and sulfonylureas can cause your blood sugar to drop very low. Call your doctor if you think you are having a problem with your medicine. · Check your blood sugar often. You can use a glucose monitor. Keeping track can help you know how certain foods, activities, and medicines affect your blood sugar. And it can help you keep your blood sugar from getting so low that it's not safe. When should you call for help? Call 911 anytime you think you may need emergency care. For example, call if:    · You passed out (lost consciousness).     · You are confused or cannot think clearly.     · Your blood sugar is very high or very low. Watch closely for changes in your health, and be sure to contact your doctor if:    · Your blood sugar stays outside the level your doctor set for you.     · You have any problems. Where can you learn more? Go to [email or web address] and sign in to your [employer] account.  Enter H153 in the [address] Information box to learn more about \"Noninsulin Medicines for Type 2 Diabetes: Care Instructions. \"     If you do not have an account, please click on the \"Sign Up Now\" link. Current as of: August 31, 2020               Content Version: 12.8  © 3461-3195 Healthwise, Incorporated. Care instructions adapted under license by [facility] ([facility]). If you have questions about a medical condition or this instruction, always ask your healthcare professional. [address] any warranty or liability for your use of this information.

## 2021-06-07 NOTE — PROGRESS NOTES
[facility]  [address]  [address] [address]  Dept: [phone]  Dept Fax: [phone]    Elo Grubbs is a 76 y.o. male who presents today for his medicalconditions/complaints as noted below. Elo Grubbs is c/o of Established New Doctor (previous [facility] pt), Memory Loss (wants to see if there is meds), Nail Problem (nail fell off about a week ago), Health Maintenance (colonscopy 2015- [facility] not in care everywhere), Medication Refill (needs 90 day supply), and Diabetes (due for a1c)      HPI:           61-year-old male patient presents with complaints of encounter to establish care. Current complaints include progressive memory decline. Reports that for the past several years he has noticed slightly worsening memory. Reports that over the past 5 months this has worsened significantly. Reports that his wife will tell him to go to the store and by the time you the story he forgot what he was going there for. Reports that he had a nail that fell off of the left second toe. Reports the nail was abnormally thickened and was unable to be trimmed and did fall off. History of type 2 diabetes takes Janumet, glimepiride. Last A1c 8.3    History hypertension takes lisinopril 10 mg    History hyperlipidemia takes Zocor 20 mg    History of generalized arthritis and neuropathy takes gabapentin 300 twice daily and meloxicam.      Past Medical History:   Diagnosis Date    Depression     Diabetes mellitus (HCC)     Hypertension     Neuropathy         Current Outpatient Medications   Medication Sig Dispense Refill    memantine (NAMENDA) 5 MG tablet Take 1 tablet by mouth daily 30 tablet 1    meloxicam (MOBIC) 7.5 MG tablet Take 1 tablet by mouth daily 90 tablet 5    gabapentin (NEURONTIN) 300 MG capsule Take 1 capsule by mouth 2 times daily for 30 days.  180 capsule 5    simvastatin (ZOCOR) 20 MG tablet TAKE 1 TABLET NIGHTLY 30 tablet 11    lisinopril (PRINIVIL;ZESTRIL) 10 MG tablet Take 1 tablet by mouth daily 30 tablet 0    sitaGLIPtan-metFORMIN (JANUMET)  MG per tablet Janumet  MG Oral Tablet TAKE 1 TABLET TWICE DAILY WITH MEALS. Refills: 0 Active 180 tablet 5    glimepiride (AMARYL) 2 MG tablet Take 2 tablets by mouth every morning 180 tablet 5    tamsulosin (FLOMAX) 0.4 MG capsule Take 1 capsule by mouth daily 90 capsule 11    zoster recombinant adjuvanted vaccine (SHINGRIX) 50 MCG/0.5ML SUSR injection 50 MCG IM then repeat 2-6 months. 0.5 mL 1     No current facility-administered medications for this visit. No Known Allergies    Subjective:      Review of Systems   Constitutional: Negative for chills and fatigue. HENT: Negative for congestion, ear pain, sinus pain and sore throat. Eyes: Negative for pain and visual disturbance. Respiratory: Negative for cough and shortness of breath. Cardiovascular: Negative for chest pain and palpitations. Gastrointestinal: Negative for abdominal pain, diarrhea, nausea and vomiting. Genitourinary: Negative for penile pain and testicular pain. Musculoskeletal: Negative for back pain, joint swelling and neck pain. Skin: Negative for rash. Neurological: Negative for dizziness and light-headedness. Hematological: Does not bruise/bleed easily. Psychiatric/Behavioral: Positive for decreased concentration. All other systems reviewed and are negative.      :Objective     Physical Exam  Vitals and nursing note reviewed. Constitutional:       General: He is not in acute distress. Appearance: Normal appearance. He is not toxic-appearing. HENT:      Mouth/Throat:      Mouth: Mucous membranes are moist.   Cardiovascular:      Rate and Rhythm: Normal rate. Pulmonary:      Effort: Pulmonary effort is normal. No respiratory distress. Breath sounds: Normal breath sounds. Musculoskeletal:        Feet:    Skin:     General: Skin is warm and dry. Neurological:      General: No focal deficit present. Mental Status: He is alert and oriented to person, place, and time.        /70   Pulse 66   Resp 18   Ht 5' 11\" (1.803 m)   Wt 189 lb (85.7 kg)   SpO2 96%   BMI 26.36 kg/m²     Lab Review   Hospital Outpatient Visit on 12/14/2020   Component Date Value    Hepatitis B Surface Ag 12/14/2020 NONREACTIVE     Hepatitis C Ab 12/14/2020 NONREACTIVE     Hep B Core Ab, IgM 12/14/2020 NONREACTIVE     Hep A IgM 12/14/2020 NONREACTIVE     C-Peptide 12/14/2020 2.3     Thyroid Peroxidase (Tpo)* 12/14/2020 <10.0     Thyroglobulin Ab 12/14/2020 <20.0     TSH 12/14/2020 1.32     PSA 12/14/2020 4.18*    Hemoglobin A1C 12/14/2020 8.3*    Estimated Avg Glucose 12/14/2020 192     Glucose 12/14/2020 167*    BUN 12/14/2020 21     CREATININE 12/14/2020 0.89     Bun/Cre Ratio 12/14/2020 NOT REPORTED     Calcium 12/14/2020 9.7     Sodium 12/14/2020 138     Potassium 12/14/2020 5.7*    Chloride 12/14/2020 102     CO2 12/14/2020 27     Anion Gap 12/14/2020 9     Alkaline Phosphatase 12/14/2020 68     ALT 12/14/2020 12     AST 12/14/2020 13     Total Bilirubin 12/14/2020 0.52     Total Protein 12/14/2020 7.0     Albumin 12/14/2020 4.1     Albumin/Globulin Ratio 12/14/2020 1.4     GFR Non- 12/14/2020 >60     GFR  12/14/2020 >60     GFR Comment 12/14/2020          GFR Staging 12/14/2020 NOT REPORTED     WBC 12/14/2020 6.6     RBC 12/14/2020 5.03     Hemoglobin 12/14/2020 15.1     Hematocrit 12/14/2020 48.3     MCV 12/14/2020 96.0     MCH 12/14/2020 30.0     MCHC 12/14/2020 31.3     RDW 12/14/2020 12.7     Platelets 94/93/8652 275     MPV 12/14/2020 11.1     NRBC Automated 12/14/2020 0.0     Differential Type 12/14/2020 NOT REPORTED     Seg Neutrophils 12/14/2020 62     Lymphocytes 12/14/2020 22*    Monocytes 12/14/2020 11     Eosinophils % 12/14/2020 3     Basophils 12/14/2020 1     Immature Granulocytes 12/14/2020 1*    Segs Absolute 12/14/2020 4.15     Absolute Lymph # 12/14/2020 1.42     Absolute Mono # 12/14/2020 0.73     Absolute Eos # 12/14/2020 0.19     Basophils Absolute 12/14/2020 0.07     Absolute Immature Granul* 12/14/2020 0.03     WBC Morphology 12/14/2020 NOT REPORTED     RBC Morphology 12/14/2020 NOT REPORTED     Platelet Estimate 54/38/5705 NOT REPORTED     Cholesterol, Fasting 12/14/2020 168     HDL 12/14/2020 48     LDL Cholesterol 12/14/2020 106     Chol/HDL Ratio 12/14/2020 3.5     Triglyceride, Fasting 12/14/2020 68     VLDL 12/14/2020 NOT REPORTED        Assessment and Plan      1. Memory loss  Assessment & Plan:   Trial namenda, refer to neuro placed  Orders:  -     memantine (NAMENDA) 5 MG tablet; Take 1 tablet by mouth daily, [facility]30 tablet[facility] [address], [facility], Neurology, [facility]  2. Hypertension, unspecified type  Assessment & Plan:   Well-controlled, continue current medications  Orders:  -     CBC With Auto Differential; Future  -     Comprehensive Metabolic Panel; Future  3. Hyperlipidemia, unspecified hyperlipidemia type  Assessment & Plan:   Well-controlled, continue current medications  Orders:  -     Lipid Panel; Future  4. Type 2 diabetes mellitus without complication, without long-term current use of insulin (HCC)  Assessment & Plan:   Well-controlled, continue current medications   A1c improved   Orders:  -     POCT glycosylated hemoglobin (Hb A1C)                 Results for orders placed or performed in visit on 06/07/21   POCT glycosylated hemoglobin (Hb A1C)   Result Value Ref Range    Hemoglobin A1C 6.2 %             Return in about 3 months (around 9/7/2021), or if symptoms worsen or fail to improve.         Orders Placed This Encounter   Medications    memantine (NAMENDA) 5 MG tablet     Sig: Take 1 tablet by mouth daily     Dispense:  30 tablet     Refill:  1        Patient given educational materials - see patient instructions. Discussed use, benefit, and side effects of prescribed medications. All patientquestions answered. Pt voiced understanding. Patient given educational materials - see patient instructions. Discussed use, benefit, and side effects of prescribed medications. All patientquestions answered. Pt voiced understanding. This note was transcribed using dictation with Dragon services. Efforts were made to correct any errors but some words may be misinterpreted.     Electronically signed by CRISTINA Godfrey CNP on 6/7/2021at 12:41 PM

## 2021-06-07 NOTE — PROGRESS NOTES
Visit Information    Have you changed or started any medications since your last visit including any over-the-counter medicines, vitamins, or herbal medicines? no   Are you having any side effects from any of your medications? -  no  Have you stopped taking any of your medications? Is so, why? -  no    Have you seen any other physician or provider since your last visit? No  Have you had any other diagnostic tests since your last visit? No  Have you been seen in the emergency room and/or had an admission to a hospital since we last saw you? No  Have you had your routine dental cleaning in the past 6 months? no    Have you activated your [employer] account? If not, what are your barriers?  No:      Patient Care Team:  Riri Luna DO as PCP - General (Family Medicine)  Riri Luna DO as PCP - [facility] Provider    Medical History Review  Past Medical, Family, and Social History reviewed and does contribute to the patient presenting condition    Health Maintenance   Topic Date Due    AAA screen  Never done    Diabetic retinal exam  Never done    DTaP/Tdap/Td vaccine (1 - Tdap) Never done    Shingles Vaccine (1 of 2) Never done    Colon cancer screen colonoscopy  Never done    A1C test (Diabetic or Prediabetic)  12/14/2021    Lipid screen  12/14/2021    Potassium monitoring  12/14/2021    Creatinine monitoring  12/14/2021    PSA counseling  12/14/2021    Diabetic foot exam  12/29/2021    Annual Wellness Visit (AWV)  03/18/2022    Flu vaccine  Completed    Pneumococcal 65+ years Vaccine  Completed    COVID-19 Vaccine  Completed    Hepatitis A vaccine  Aged Out    Hib vaccine  Aged Out    Meningococcal (ACWY) vaccine  Aged Out    Hepatitis C screen  Discontinued

## 2021-06-28 ENCOUNTER — TELEPHONE (OUTPATIENT)
Dept: FAMILY MEDICINE CLINIC | Age: 76
End: 2021-06-28

## 2021-06-28 DIAGNOSIS — E11.9 TYPE 2 DIABETES MELLITUS WITHOUT COMPLICATION, WITHOUT LONG-TERM CURRENT USE OF INSULIN (HCC): Primary | ICD-10-CM

## 2021-06-28 RX ORDER — GLIMEPIRIDE 2 MG/1
4 TABLET ORAL EVERY MORNING
Qty: 180 TABLET | Refills: 5 | Status: SHIPPED | OUTPATIENT
Start: 2021-06-28 | End: 2022-08-01

## 2021-06-28 NOTE — TELEPHONE ENCOUNTER
----- Message from Louie Liu sent at 6/28/2021 10:57 AM EDT -----  Subject: Refill Request    QUESTIONS  Name of Medication? glimepiride (AMARYL) 2 MG tablet  Patient-reported dosage and instructions? once a day   How many days do you have left? 0  Preferred Pharmacy? [address] phone number (if available)? [phone]  Additional Information for Provider? 90 day supply he needs and only got   30 last time and now is out of them. needs asap let patient know about   this   ---------------------------------------------------------------------------  --------------  CALL BACK INFO  What is the best way for the office to contact you? OK to leave message on   voicemail  Preferred Call Back Phone Number?  [phone]

## 2021-07-12 RX ORDER — SIMVASTATIN 20 MG
TABLET ORAL
Qty: 30 TABLET | Refills: 0 | Status: SHIPPED | OUTPATIENT
Start: 2021-07-12 | End: 2021-07-19 | Stop reason: SDUPTHER

## 2021-07-19 DIAGNOSIS — Z76.0 MEDICATION REFILL: Primary | ICD-10-CM

## 2021-07-19 RX ORDER — SIMVASTATIN 20 MG
TABLET ORAL
Qty: 90 TABLET | Refills: 1 | Status: SHIPPED | OUTPATIENT
Start: 2021-07-19 | End: 2022-03-03

## 2021-08-05 DIAGNOSIS — R41.3 MEMORY LOSS: ICD-10-CM

## 2021-08-05 RX ORDER — TAMSULOSIN HYDROCHLORIDE 0.4 MG/1
0.4 CAPSULE ORAL DAILY
Qty: 90 CAPSULE | Refills: 11 | Status: SHIPPED | OUTPATIENT
Start: 2021-08-05 | End: 2022-08-12

## 2021-08-05 RX ORDER — MEMANTINE HYDROCHLORIDE 5 MG/1
5 TABLET ORAL DAILY
Qty: 30 TABLET | Refills: 1 | Status: SHIPPED | OUTPATIENT
Start: 2021-08-05 | End: 2021-09-10 | Stop reason: SDUPTHER

## 2021-08-05 RX ORDER — MELOXICAM 7.5 MG/1
7.5 TABLET ORAL DAILY
Qty: 90 TABLET | Refills: 5 | Status: SHIPPED | OUTPATIENT
Start: 2021-08-05

## 2021-08-27 RX ORDER — LISINOPRIL 10 MG/1
10 TABLET ORAL DAILY
Qty: 90 TABLET | Refills: 1 | Status: SHIPPED | OUTPATIENT
Start: 2021-08-27 | End: 2021-09-20

## 2021-09-10 ENCOUNTER — OFFICE VISIT (OUTPATIENT)
Dept: FAMILY MEDICINE CLINIC | Age: 76
End: 2021-09-10
Payer: MEDICARE

## 2021-09-10 ENCOUNTER — HOSPITAL ENCOUNTER (OUTPATIENT)
Age: 76
Setting detail: SPECIMEN
Discharge: HOME | End: 2021-09-10
Payer: MEDICARE

## 2021-09-10 VITALS
BODY MASS INDEX: 27.44 KG/M2 | DIASTOLIC BLOOD PRESSURE: 78 MMHG | HEIGHT: 71 IN | OXYGEN SATURATION: 98 % | HEART RATE: 72 BPM | SYSTOLIC BLOOD PRESSURE: 118 MMHG | RESPIRATION RATE: 18 BRPM | WEIGHT: 196 LBS

## 2021-09-10 DIAGNOSIS — E78.5 HYPERLIPIDEMIA, UNSPECIFIED HYPERLIPIDEMIA TYPE: ICD-10-CM

## 2021-09-10 DIAGNOSIS — K21.9 GASTROESOPHAGEAL REFLUX DISEASE WITHOUT ESOPHAGITIS: ICD-10-CM

## 2021-09-10 DIAGNOSIS — E11.40 TYPE 2 DIABETES MELLITUS WITH DIABETIC NEUROPATHY, WITHOUT LONG-TERM CURRENT USE OF INSULIN (HCC): ICD-10-CM

## 2021-09-10 DIAGNOSIS — E11.9 TYPE 2 DIABETES MELLITUS WITHOUT COMPLICATION, WITHOUT LONG-TERM CURRENT USE OF INSULIN (HCC): ICD-10-CM

## 2021-09-10 DIAGNOSIS — I10 ESSENTIAL HYPERTENSION: ICD-10-CM

## 2021-09-10 DIAGNOSIS — R41.3 MEMORY LOSS: ICD-10-CM

## 2021-09-10 DIAGNOSIS — Z12.5 PROSTATE CANCER SCREENING: ICD-10-CM

## 2021-09-10 DIAGNOSIS — K21.9 GASTROESOPHAGEAL REFLUX DISEASE WITHOUT ESOPHAGITIS: Primary | ICD-10-CM

## 2021-09-10 LAB
ABSOLUTE EOS #: 0.19 K/UL (ref 0–0.44)
ABSOLUTE IMMATURE GRANULOCYTE: <0.03 K/UL (ref 0–0.3)
ABSOLUTE LYMPH #: 1.34 K/UL (ref 1.1–3.7)
ABSOLUTE MONO #: 0.77 K/UL (ref 0.1–1.2)
ALBUMIN SERPL-MCNC: 4.3 G/DL (ref 3.5–5.2)
ALBUMIN/GLOBULIN RATIO: 1.7 (ref 1–2.5)
ALP BLD-CCNC: 69 U/L (ref 40–129)
ALT SERPL-CCNC: 21 U/L (ref 5–41)
ANION GAP SERPL CALCULATED.3IONS-SCNC: 13 MMOL/L (ref 9–17)
AST SERPL-CCNC: 20 U/L
BASOPHILS # BLD: 1 % (ref 0–2)
BASOPHILS ABSOLUTE: 0.07 K/UL (ref 0–0.2)
BILIRUB SERPL-MCNC: 0.28 MG/DL (ref 0.3–1.2)
BUN BLDV-MCNC: 23 MG/DL (ref 8–23)
BUN/CREAT BLD: ABNORMAL (ref 9–20)
CALCIUM SERPL-MCNC: 9.3 MG/DL (ref 8.6–10.4)
CHLORIDE BLD-SCNC: 104 MMOL/L (ref 98–107)
CHOLESTEROL/HDL RATIO: 2.9
CHOLESTEROL: 141 MG/DL
CO2: 24 MMOL/L (ref 20–31)
CREAT SERPL-MCNC: 0.95 MG/DL (ref 0.7–1.2)
DIFFERENTIAL TYPE: ABNORMAL
EOSINOPHILS RELATIVE PERCENT: 3 % (ref 1–4)
ESTIMATED AVERAGE GLUCOSE: 146 MG/DL
GFR AFRICAN AMERICAN: >60 ML/MIN
GFR NON-AFRICAN AMERICAN: >60 ML/MIN
GFR SERPL CREATININE-BSD FRML MDRD: ABNORMAL ML/MIN/{1.73_M2}
GFR SERPL CREATININE-BSD FRML MDRD: ABNORMAL ML/MIN/{1.73_M2}
GLUCOSE BLD-MCNC: 127 MG/DL (ref 70–99)
HBA1C MFR BLD: 6.7 % (ref 4–6)
HCT VFR BLD CALC: 43.3 % (ref 40.7–50.3)
HDLC SERPL-MCNC: 49 MG/DL
HEMOGLOBIN: 13.5 G/DL (ref 13–17)
IMMATURE GRANULOCYTES: 0 %
LDL CHOLESTEROL: 85 MG/DL (ref 0–130)
LYMPHOCYTES # BLD: 21 % (ref 24–43)
MCH RBC QN AUTO: 30.1 PG (ref 25.2–33.5)
MCHC RBC AUTO-ENTMCNC: 31.2 G/DL (ref 28.4–34.8)
MCV RBC AUTO: 96.7 FL (ref 82.6–102.9)
MONOCYTES # BLD: 12 % (ref 3–12)
NRBC AUTOMATED: 0 PER 100 WBC
PDW BLD-RTO: 12.9 % (ref 11.8–14.4)
PLATELET # BLD: 266 K/UL (ref 138–453)
PLATELET ESTIMATE: ABNORMAL
PMV BLD AUTO: 10.8 FL (ref 8.1–13.5)
POTASSIUM SERPL-SCNC: 5.9 MMOL/L (ref 3.7–5.3)
PROSTATE SPECIFIC ANTIGEN: 3.62 UG/L
RBC # BLD: 4.48 M/UL (ref 4.21–5.77)
RBC # BLD: ABNORMAL 10*6/UL
SEG NEUTROPHILS: 63 % (ref 36–65)
SEGMENTED NEUTROPHILS ABSOLUTE COUNT: 4.15 K/UL (ref 1.5–8.1)
SODIUM BLD-SCNC: 141 MMOL/L (ref 135–144)
TOTAL PROTEIN: 6.9 G/DL (ref 6.4–8.3)
TRIGL SERPL-MCNC: 33 MG/DL
VLDLC SERPL CALC-MCNC: NORMAL MG/DL (ref 1–30)
WBC # BLD: 6.5 K/UL (ref 3.5–11.3)
WBC # BLD: ABNORMAL 10*3/UL

## 2021-09-10 PROCEDURE — 90694 VACC AIIV4 NO PRSRV 0.5ML IM: CPT | Performed by: NURSE PRACTITIONER

## 2021-09-10 PROCEDURE — G8427 DOCREV CUR MEDS BY ELIG CLIN: HCPCS | Performed by: NURSE PRACTITIONER

## 2021-09-10 PROCEDURE — G0008 ADMIN INFLUENZA VIRUS VAC: HCPCS | Performed by: NURSE PRACTITIONER

## 2021-09-10 PROCEDURE — 4040F PNEUMOC VAC/ADMIN/RCVD: CPT | Performed by: NURSE PRACTITIONER

## 2021-09-10 PROCEDURE — 1123F ACP DISCUSS/DSCN MKR DOCD: CPT | Performed by: NURSE PRACTITIONER

## 2021-09-10 PROCEDURE — 99213 OFFICE O/P EST LOW 20 MIN: CPT | Performed by: NURSE PRACTITIONER

## 2021-09-10 PROCEDURE — 1036F TOBACCO NON-USER: CPT | Performed by: NURSE PRACTITIONER

## 2021-09-10 PROCEDURE — G8417 CALC BMI ABV UP PARAM F/U: HCPCS | Performed by: NURSE PRACTITIONER

## 2021-09-10 RX ORDER — PANTOPRAZOLE SODIUM 20 MG/1
20 TABLET, DELAYED RELEASE ORAL
Qty: 30 TABLET | Refills: 1 | Status: SHIPPED | OUTPATIENT
Start: 2021-09-10 | End: 2021-12-10

## 2021-09-10 RX ORDER — MEMANTINE HYDROCHLORIDE 5 MG/1
5 TABLET ORAL DAILY
Qty: 90 TABLET | Refills: 1 | Status: SHIPPED | OUTPATIENT
Start: 2021-09-10 | End: 2021-10-18 | Stop reason: ALTCHOICE

## 2021-09-10 ASSESSMENT — ENCOUNTER SYMPTOMS
SINUS PAIN: 0
EYE PAIN: 0
SHORTNESS OF BREATH: 0
COUGH: 0
NAUSEA: 0
VOMITING: 0
DIARRHEA: 0
SORE THROAT: 0
ABDOMINAL PAIN: 1
BACK PAIN: 0

## 2021-09-10 NOTE — PROGRESS NOTES
[address]  [address]  [address] [facility] [address]  Dept: [phone]  Dept Fax: [phone]    Kristy Lopez is a 68 y.o. male who presents today for his medicalconditions/complaints as noted below. Kristy Lopez is c/o of 3 Month Follow-Up and Diabetes (will get labs done after he leaves here)      HPI:         51-year-old male patient presents with complaints of follow-up. Patient was trialed on Namenda has seen some benefit with taking, does have follow-up with neurology regarding memory loss    Has concerns for GERD. Reports that when he eats before bed he feels burning in the epigastric region. Reports this has woken him up several times. Has not treated with any medications for this in the past.      Past Medical History:   Diagnosis Date    Depression     Diabetes mellitus ([address]     Hypertension     Neuropathy         Current Outpatient Medications   Medication Sig Dispense Refill    memantine (NAMENDA) 5 MG tablet Take 1 tablet by mouth daily 90 tablet 1    pantoprazole (PROTONIX) 20 MG tablet Take 1 tablet by mouth every morning (before breakfast) 30 tablet 1    lisinopril (PRINIVIL;ZESTRIL) 10 MG tablet Take 1 tablet by mouth daily 90 tablet 1    tamsulosin (FLOMAX) 0.4 MG capsule Take 1 capsule by mouth daily 90 capsule 11    meloxicam (MOBIC) 7.5 MG tablet Take 1 tablet by mouth daily 90 tablet 5    simvastatin (ZOCOR) 20 MG tablet TAKE 1 TABLET NIGHTLY 90 tablet 1    glimepiride (AMARYL) 2 MG tablet Take 2 tablets by mouth every morning 180 tablet 5    gabapentin (NEURONTIN) 300 MG capsule Take 1 capsule by mouth 2 times daily for 30 days. 180 capsule 5    sitaGLIPtan-metFORMIN (JANUMET)  MG per tablet Janumet  MG Oral Tablet TAKE 1 TABLET TWICE DAILY WITH MEALS.   Refills: 0 Active 180 tablet 5    zoster recombinant adjuvanted vaccine (SHINGRIX) 50 MCG/0.5ML SUSR injection 50 MCG IM then repeat 2-6 months. 0.5 mL 1     No current facility-administered medications for this visit. No Known Allergies    Subjective:      Review of Systems   Constitutional: Negative for chills and fatigue. HENT: Negative for congestion, ear pain, sinus pain and sore throat. Eyes: Negative for pain and visual disturbance. Respiratory: Negative for cough and shortness of breath. Cardiovascular: Negative for chest pain and palpitations. Gastrointestinal: Positive for abdominal pain. Negative for diarrhea, nausea and vomiting. Genitourinary: Negative for penile pain and testicular pain. Musculoskeletal: Negative for back pain, joint swelling and neck pain. Skin: Negative for rash. Neurological: Negative for dizziness and light-headedness. Hematological: Does not bruise/bleed easily. All other systems reviewed and are negative.      :Objective     Physical Exam  Vitals and nursing note reviewed. Constitutional:       General: He is not in acute distress. Appearance: Normal appearance. He is not toxic-appearing. Cardiovascular:      Rate and Rhythm: Normal rate. Pulmonary:      Effort: Pulmonary effort is normal. No respiratory distress. Breath sounds: Normal breath sounds. Skin:     General: Skin is warm and dry. Neurological:      General: No focal deficit present. Mental Status: He is alert and oriented to person, place, and time. /78   Pulse 72   Resp 18   Ht 5' 11\" (1.803 m)   Wt 196 lb (88.9 kg)   SpO2 98%   BMI 27.34 kg/m²     Lab Review   Office Visit on 06/07/2021   Component Date Value    Hemoglobin A1C 06/07/2021 6.2        Assessment and Plan      1. Gastroesophageal reflux disease without esophagitis  -     CBC With Auto Differential; Future  -     pantoprazole (PROTONIX) 20 MG tablet; Take 1 tablet by mouth every morning (before breakfast), Disp-30 tablet, R-1Normal  2.  Memory loss  -     CBC With Auto Differential; Future  -     memantine (NAMENDA) 5 MG tablet; Take 1 tablet by mouth daily, Disp-90 tablet, R-1Normal  3. Essential hypertension  -     CBC With Auto Differential; Future  -     Comprehensive Metabolic Panel; Future  -     Lipid Panel; Future  4. Hyperlipidemia, unspecified hyperlipidemia type  -     CBC With Auto Differential; Future  -     Comprehensive Metabolic Panel; Future  -     Lipid Panel; Future  5. Type 2 diabetes mellitus without complication, without long-term current use of insulin ([facility])  -     Hemoglobin A1C; Future  -     CBC With Auto Differential; Future  6. Type 2 diabetes mellitus with diabetic neuropathy, without long-term current use of insulin ([address]  7. Prostate cancer screening  -     PSA Screening; Future  Routine labs to complete including CBC, CMP, lipids, A1c, PSA    We will trial Protonix    Namenda sent continue plan for neurology follow-up    Return in 3 months, sooner as needed              No results found for this visit on 09/10/21. Return in about 3 months (around 12/10/2021), or if symptoms worsen or fail to improve. Orders Placed This Encounter   Medications    memantine (NAMENDA) 5 MG tablet     Sig: Take 1 tablet by mouth daily     Dispense:  90 tablet     Refill:  1    pantoprazole (PROTONIX) 20 MG tablet     Sig: Take 1 tablet by mouth every morning (before breakfast)     Dispense:  30 tablet     Refill:  1        Patient given educational materials - see patient instructions. Discussed use, benefit, and side effects of prescribed medications. All patientquestions answered. Pt voiced understanding. Patient given educational materials - see patient instructions. Discussed use, benefit, and side effects of prescribed medications. All patientquestions answered. Pt voiced understanding. This note was transcribed using dictation with Dragon services. Efforts were made to correct any errors but some words may be misinterpreted.     Electronically signed by CRISTINA Nunes CNP on 9/10/2021at 9:55 AM

## 2021-09-10 NOTE — PROGRESS NOTES
Vaccine Information Sheet, \"Influenza - Inactivated\"  given to Madelin Huertas, or parent/legal guardian of  Madelin Huertas and verbalized understanding. Patient responses:    Have you ever had a reaction to a flu vaccine? No  Are you able to eat eggs without adverse effects? No  Do you have any current illness? No  Have you ever had Guillian Frank[place] Syndrome? No    Flu vaccine given per order. Please see immunization tab. After obtaining consent, and per orders of Dr. Pritesh Sarah, injection of Flu given in Left deltoid by Torres Pimentel MA. Patient instructed to remain in clinic for 20 minutes afterwards, and to report any adverse reaction to me immediately.

## 2021-09-10 NOTE — PATIENT INSTRUCTIONS

## 2021-09-12 DIAGNOSIS — E87.5 HYPERKALEMIA: Primary | ICD-10-CM

## 2021-09-20 ENCOUNTER — HOSPITAL ENCOUNTER (OUTPATIENT)
Age: 76
Setting detail: SPECIMEN
Discharge: HOME | End: 2021-09-20
Payer: MEDICARE

## 2021-09-20 DIAGNOSIS — E87.5 HYPERKALEMIA: ICD-10-CM

## 2021-09-20 DIAGNOSIS — I10 HYPERTENSION, UNSPECIFIED TYPE: Primary | ICD-10-CM

## 2021-09-20 LAB — POTASSIUM SERPL-SCNC: 5.4 MMOL/L (ref 3.7–5.3)

## 2021-09-20 RX ORDER — AMLODIPINE BESYLATE 2.5 MG/1
2.5 TABLET ORAL DAILY
Qty: 30 TABLET | Refills: 2 | Status: SHIPPED | OUTPATIENT
Start: 2021-09-20 | End: 2021-10-19 | Stop reason: SDUPTHER

## 2021-10-18 ENCOUNTER — OFFICE VISIT (OUTPATIENT)
Dept: NEUROLOGY | Age: 76
End: 2021-10-18
Payer: MEDICARE

## 2021-10-18 VITALS
BODY MASS INDEX: 28.35 KG/M2 | HEART RATE: 69 BPM | DIASTOLIC BLOOD PRESSURE: 73 MMHG | SYSTOLIC BLOOD PRESSURE: 128 MMHG | TEMPERATURE: 97.4 F | HEIGHT: 70 IN | WEIGHT: 198 LBS

## 2021-10-18 DIAGNOSIS — G31.84 MILD COGNITIVE IMPAIRMENT: Primary | ICD-10-CM

## 2021-10-18 PROCEDURE — 99204 OFFICE O/P NEW MOD 45 MIN: CPT | Performed by: NURSE PRACTITIONER

## 2021-10-18 PROCEDURE — 1036F TOBACCO NON-USER: CPT | Performed by: NURSE PRACTITIONER

## 2021-10-18 PROCEDURE — 1123F ACP DISCUSS/DSCN MKR DOCD: CPT | Performed by: NURSE PRACTITIONER

## 2021-10-18 PROCEDURE — G8417 CALC BMI ABV UP PARAM F/U: HCPCS | Performed by: NURSE PRACTITIONER

## 2021-10-18 PROCEDURE — 4040F PNEUMOC VAC/ADMIN/RCVD: CPT | Performed by: NURSE PRACTITIONER

## 2021-10-18 PROCEDURE — G8427 DOCREV CUR MEDS BY ELIG CLIN: HCPCS | Performed by: NURSE PRACTITIONER

## 2021-10-18 PROCEDURE — G8484 FLU IMMUNIZE NO ADMIN: HCPCS | Performed by: NURSE PRACTITIONER

## 2021-10-18 NOTE — PROGRESS NOTES
[facility]            [address]          [place], [address] [facility]          Dept: [phone]          Dept Fax: [phone]      MD Yenny Ann MD Ahmed B. Gennette Boon, MD Leobardo Harness, MD Theron Piety, EDELMIRA         New Patient Consultation    10/18/2021    HISTORY OF PRESENT ILLNESS:       I had the pleasure of seeing Rene Arriaga who presents to establish neurologic care. The patient presents for evaluation of memory difficulties. The patient has a history of essential hypertension, type 2 diabetes and hyperlipidemia. For management of his memory difficulties the patient was started on namenda 5 mg by his PCP. The patient is here today reporting that he is having increased difficulties with his short term memory problems. This has been worsening over the past 6 months. He denies any familial history of dementia. He notes that his wife notices his memory problems and recommended he follow with neurology. He initially noticed improvement in his memory after starting namenda but no longer feels it is effective. The patient scored a 28/30 on MMSE completed at today's visit. He denies any feelings of depression or anxiety. The patient denies any difficulties with paying his bills and denies getting lost while driving. He admits to a diabetic neuropathy which is well controlled with gabapentin 300 mg twice daily.           PAST MEDICAL HISTORY:         Diagnosis Date    Depression     Diabetes mellitus ([address]     Hypertension     Neuropathy         PAST SURGICAL HISTORY:         Procedure Laterality Date    KNEE SURGERY          SOCIAL HISTORY:     Social History     Socioeconomic History    Marital status:      Spouse name: Not on file    Number of children: Not on file    Years of education: Not on file    Highest education level: Not on file   Occupational History    Not on file   Tobacco Use    Smoking status: Former Smoker     Packs/day: 1.00     Years: 15.00     Pack years: 15.00     Types: Cigarettes     Start date: 1972     Quit date:      Years since quittin.8    Smokeless tobacco: Never Used   Vaping Use    Vaping Use: Never used   Substance and Sexual Activity    Alcohol use: Not Currently    Drug use: Never    Sexual activity: Not Currently   Other Topics Concern    Not on file   Social History Narrative    Not on file     Social Determinants of Health     Financial Resource Strain:     Difficulty of Paying Living Expenses:    Food Insecurity:     Worried About Running Out of Food in the Last Year:     [address] in the Last Year:    Transportation Needs:     Lack of Transportation (Medical):      Lack of Transportation (Non-Medical):    Physical Activity:     Days of Exercise per Week:     Minutes of Exercise per Session:    Stress:     Feeling of Stress :    Social Connections:     Frequency of Communication with Friends and Family:     Frequency of Social Gatherings with Friends and Family:     Attends Congregational Services:     Active Member of Clubs or Organizations:     Attends Club or Organization Meetings:     Marital Status:    Intimate Partner Violence:     Fear of Current or Ex-Partner:     Emotionally Abused:     Physically Abused:     Sexually Abused:        CURRENT MEDICATIONS:     Current Outpatient Medications   Medication Sig Dispense Refill    amLODIPine (NORVASC) 2.5 MG tablet Take 1 tablet by mouth daily 30 tablet 2    memantine (NAMENDA) 5 MG tablet Take 1 tablet by mouth daily 90 tablet 1    pantoprazole (PROTONIX) 20 MG tablet Take 1 tablet by mouth every morning (before breakfast) 30 tablet 1    tamsulosin (FLOMAX) 0.4 MG capsule Take 1 capsule by mouth daily 90 capsule 11    meloxicam (MOBIC) 7.5 MG tablet Take 1 tablet by mouth daily 90 tablet 5    simvastatin (ZOCOR) 20 MG tablet TAKE 1 TABLET NIGHTLY 90 tablet 1    glimepiride (AMARYL) 2 MG tablet Take 2 tablets by mouth every morning 180 tablet 5    gabapentin (NEURONTIN) 300 MG capsule Take 1 capsule by mouth 2 times daily for 30 days. 180 capsule 5    sitaGLIPtan-metFORMIN (JANUMET)  MG per tablet Janumet  MG Oral Tablet TAKE 1 TABLET TWICE DAILY WITH MEALS. Refills: 0 Active 180 tablet 5    zoster recombinant adjuvanted vaccine (SHINGRIX) 50 MCG/0.5ML SUSR injection 50 MCG IM then repeat 2-6 months. 0.5 mL 1     No current facility-administered medications for this visit. ALLERGIES:   No Known Allergies                       All items selected indicate a positive finding. Those items not selected are negative. Constitutional [] Weight loss/gain   [] Fatigue  [] Fever/Chills   HEENT [] Hearing Loss  [] Visual Disturbance  [] Tinnitus  [] Eye pain   Respiratory [] Shortness of Breath  [] Cough  [] Snoring   Cardiovascular [] Chest Pain  [] Palpitations  [] Lightheaded   GI [] Constipation  [] Diarrhea  [] Swallowing change  [] Nausea/vomiting    [] Urinary Frequency  [] Urinary Urgency   Musculoskeletal [] Neck pain  [] Back pain  [] Muscle pain  [] Restless legs   Dermatologic [] Skin changes   Neurologic [x] Memory loss/confusion  [] Seizures  [] Trouble walking or imbalance  [] Dizziness  [] Sleep disturbance  [] Weakness  [x] Numbness  [] Tremors  [] Speech Difficulty  [] Headaches  [] Light Sensitivity  [] Sound Sensitivity   Endocrinology []Excessive thirst  []Excessive hunger   Psychiatric [] Anxiety/Depression  [] Hallucination   Allergy/immunology []Hives/environmental allergies   Hematologic/lymph [] Abnormal bleeding  [] Abnormal bruising                   PHYSICAL EXAMINATION:       Vitals:    10/18/21 1017   BP: 128/73   Pulse: 69   Temp: 97.4 °F (36.3 °C)                                              .                                                                                                     General Appearance:  Alert, cooperative, no signs of distress, tremors. No ataxia or dysmetria on finger to nose or heel to shin testing      Reflex function DTR 2+ on bilateral UE and LE, symmetric. Down going toes bilaterally      Gait                   normal base and arm swing         10/18/2021  Maximum score 5 Score 5 What is the year, season, date, day, month   Maximum score 5 Score 5 Where are we: State, county, town, hospital, floor? Maximum score 3 Score 3 Name 3 objects: apple, table,scarlett. Ask patient to repeat 3 objects. Maximum score 5 Score 5 Serial sevens from 100:93, 86, 79, 72, 65  (or) spell WORLD backwards   Maximum score 3 Score 1 Recall 3 objects   Maximum score 2 Score 2 Name a pencil and watch. Maximum score 1 Score 1 Repeat the following: No ifs ands or buts.      Maximum score 3 Score 3 Follow 3 stage command take a paper in your hand, fold it in half, and put it on the table. Maximum score 1  Score 1 Read and obey the following: \"Close your eyes\"     Maximum score 1 Score 1 Write a sentence. Maximum score 1 Score 1 Copy a design below. Max 30   Patients score 28          Medical Decision Making: Thank you very much for the kind referral of Jude Russell. I look forward to working with you in the neurological care of your patient. 1. Mild cognitive impairment with a score of 28/30 on MMSE at today's visit. The patient has stroke risk factors including essential hypertension, hyperlipidemia and type 2 diabetes. 1. Recommended the patient stop taking namenda as it has provided no significant benefit and is indicated more in a moderate to severe dementia. 2. MRI of the brain without contrast  3. Obtain vitamin B12 level, T. pallidum antibodies and TSH level   4. Return for follow up visit in 6 weeks, at this visit we will discuss starting aricept depending on the results of the MRI and lab testing   2.  Diabetic neuropathy with severely diminished vibration, temperature and pinprick sensation in a glove stocking distribution to the bilateral ankles  1. Continue gabapentin 300 mg twice daily        Signed: Lela Honeycutt CNP  Please note that this chart was generated using voice recognition Dragon dictation software. Although every effort was made to ensure the accuracy of this automated transcription, some errors in transcription may have occurred. Provider Attestation: The documentation recorded by the scribe accurately reflects the service I personally performed and the decisions made by myself. Portions of this note were transcribed by a scribe. I personally performed the history, physical exam, and medical decision-making and confirm the accuracy of the information in the transcribed note. Scribe Attestation:   By signing my name below, Alycia Bence, attest that this documentation has been prepared under the direction and in the presence of Lela Honeycutt CNP.

## 2021-10-19 ENCOUNTER — TELEPHONE (OUTPATIENT)
Dept: FAMILY MEDICINE CLINIC | Age: 76
End: 2021-10-19

## 2021-10-19 DIAGNOSIS — I10 HYPERTENSION, UNSPECIFIED TYPE: Primary | ICD-10-CM

## 2021-10-19 DIAGNOSIS — E87.5 HYPERKALEMIA: ICD-10-CM

## 2021-10-19 RX ORDER — AMLODIPINE BESYLATE 2.5 MG/1
2.5 TABLET ORAL DAILY
Qty: 30 TABLET | Refills: 2 | Status: SHIPPED
Start: 2021-10-19 | End: 2021-10-20 | Stop reason: CLARIF

## 2021-10-19 NOTE — TELEPHONE ENCOUNTER
----- Message from Melissaa Husbands sent at 10/19/2021 10:56 AM EDT -----  Subject: Message to Provider    QUESTIONS  Information for Provider? Pt hasn't received medication that  was   switching nor has the pharmacy received anything for his new medication. Would also like a weeks worth sent the [employer], because he only   has one left. ASAP  ---------------------------------------------------------------------------  --------------  CALL BACK INFO  What is the best way for the office to contact you? OK to leave message on   voicemail  Preferred Call Back Phone Number? [phone]  ---------------------------------------------------------------------------  --------------  SCRIPT ANSWERS  Relationship to Patient?  Self

## 2021-10-27 RX ORDER — AMLODIPINE BESYLATE 2.5 MG/1
2.5 TABLET ORAL DAILY
Qty: 30 TABLET | Refills: 2 | Status: CANCELLED | OUTPATIENT
Start: 2021-10-27

## 2021-11-18 ENCOUNTER — TELEPHONE (OUTPATIENT)
Dept: FAMILY MEDICINE CLINIC | Age: 76
End: 2021-11-18

## 2021-11-18 DIAGNOSIS — E87.5 HYPERKALEMIA: ICD-10-CM

## 2021-11-18 DIAGNOSIS — I10 HYPERTENSION, UNSPECIFIED TYPE: ICD-10-CM

## 2021-11-18 RX ORDER — AMLODIPINE BESYLATE 2.5 MG/1
2.5 TABLET ORAL DAILY
Qty: 90 TABLET | Refills: 3 | Status: SHIPPED | OUTPATIENT
Start: 2021-11-18

## 2021-11-18 NOTE — TELEPHONE ENCOUNTER
----- Message from Alexandrea Vieira sent at 11/18/2021 12:18 PM EST -----  Subject: Refill Request    QUESTIONS  Name of Medication? Other - Amlodipine 2.5mg tablet   Patient-reported dosage and instructions? 2.5mg tablet once a day   How many days do you have left? 20  Preferred Pharmacy? [address] phone number (if available)? [phone]  Additional Information for Provider? Pt would also like to know if the   office is offering the booster shot or where he can go to get that done   at. Would like a call back please. Thank you.   ---------------------------------------------------------------------------  --------------  CALL BACK INFO  What is the best way for the office to contact you? OK to leave message on   voicemail  Preferred Call Back Phone Number?  [phone]

## 2021-11-19 ENCOUNTER — HOSPITAL ENCOUNTER (OUTPATIENT)
Dept: MRI IMAGING | Facility: CLINIC | Age: 76
Discharge: HOME | End: 2021-11-21
Payer: MEDICARE

## 2021-11-19 DIAGNOSIS — G31.84 MILD COGNITIVE IMPAIRMENT: ICD-10-CM

## 2021-11-19 PROCEDURE — 70551 MRI BRAIN STEM W/O DYE: CPT

## 2021-12-01 ENCOUNTER — OFFICE VISIT (OUTPATIENT)
Dept: NEUROLOGY | Age: 76
End: 2021-12-01
Payer: MEDICARE

## 2021-12-01 VITALS
SYSTOLIC BLOOD PRESSURE: 134 MMHG | HEART RATE: 69 BPM | BODY MASS INDEX: 28 KG/M2 | WEIGHT: 200 LBS | DIASTOLIC BLOOD PRESSURE: 74 MMHG | HEIGHT: 71 IN

## 2021-12-01 DIAGNOSIS — E11.40 TYPE 2 DIABETES MELLITUS WITH DIABETIC NEUROPATHY, UNSPECIFIED WHETHER LONG TERM INSULIN USE (HCC): ICD-10-CM

## 2021-12-01 DIAGNOSIS — G31.84 MILD COGNITIVE IMPAIRMENT: Primary | ICD-10-CM

## 2021-12-01 PROCEDURE — 4040F PNEUMOC VAC/ADMIN/RCVD: CPT | Performed by: NURSE PRACTITIONER

## 2021-12-01 PROCEDURE — 1123F ACP DISCUSS/DSCN MKR DOCD: CPT | Performed by: NURSE PRACTITIONER

## 2021-12-01 PROCEDURE — G8484 FLU IMMUNIZE NO ADMIN: HCPCS | Performed by: NURSE PRACTITIONER

## 2021-12-01 PROCEDURE — G8417 CALC BMI ABV UP PARAM F/U: HCPCS | Performed by: NURSE PRACTITIONER

## 2021-12-01 PROCEDURE — 99214 OFFICE O/P EST MOD 30 MIN: CPT | Performed by: NURSE PRACTITIONER

## 2021-12-01 PROCEDURE — 1036F TOBACCO NON-USER: CPT | Performed by: NURSE PRACTITIONER

## 2021-12-01 PROCEDURE — G8427 DOCREV CUR MEDS BY ELIG CLIN: HCPCS | Performed by: NURSE PRACTITIONER

## 2021-12-01 RX ORDER — DONEPEZIL HYDROCHLORIDE 10 MG/1
10 TABLET, FILM COATED ORAL NIGHTLY
Qty: 90 TABLET | Refills: 3 | Status: SHIPPED | OUTPATIENT
Start: 2021-12-01

## 2021-12-01 RX ORDER — DONEPEZIL HYDROCHLORIDE 5 MG/1
5 TABLET, FILM COATED ORAL NIGHTLY
Qty: 30 TABLET | Refills: 0 | Status: SHIPPED | OUTPATIENT
Start: 2021-12-01 | End: 2022-03-14

## 2021-12-01 NOTE — PROGRESS NOTES
[facility]            [address]onda Man[address]          [facility], [address] [facility]          Dept: [phone]          Dept Fax: [phone]    MD Fritz Lyons MD Ahmed B. Lenord Barrows, MD Joni Langton, MD Augusto Brod, CNP            12/1/2021      HISTORY OF PRESENT ILLNESS:       I had the pleasure of seeing Kay Cody, who returns for continuing neurologic care. The patient was seen last on October 18, 2021 for treatment of mild cognitive impairment and diabetic neuropathy. The patient has a mild cognitive impairment with a previous MMSE score of 28/30 at his last visit. A vitamin B12 level, t. Pallidum antibodies and TSH level were ordered at his last visit but were not completed prior to today's visit. He also completed a MRI of his brain which showed moderate atrophy. The MRI images were reviewed with the patient during his visit. He notes that he has continued to have memory difficulties prior to today's visit but they are minimal. Most of his difficulties are described as forgetting where he placed things. He denies any issues with getting lost. His wife does notice his memory problems and he reports that she describes them as mild. He also has a diabetic neuropathy and is prescribed gabapentin 300 mg twice daily for management. Testing reviewed:    MRI Brain WO Contrast 11/19/2021  Impression   1. No acute intracranial abnormality. 2. No mass effect, edema or hemorrhage. 3. Moderate volume loss is seen in the cerebrum with moderate chronic   microvascular ischemic changes.            PAST MEDICAL HISTORY:         Diagnosis Date    Depression     Diabetes mellitus ([address]     Hypertension     Neuropathy         PAST SURGICAL HISTORY:         Procedure Laterality Date    KNEE SURGERY          SOCIAL HISTORY:     Social History     Socioeconomic History    Marital status:      Spouse name: Not on file    Number of children: Not on file    Years of education: Not on file    Highest education level: Not on file   Occupational History    Not on file   Tobacco Use    Smoking status: Former Smoker     Packs/day: 1.00     Years: 15.00     Pack years: 15.00     Types: Cigarettes     Start date: 1972     Quit date:      Years since quittin.9    Smokeless tobacco: Never Used   Vaping Use    Vaping Use: Never used   Substance and Sexual Activity    Alcohol use: Not Currently    Drug use: Never    Sexual activity: Not Currently   Other Topics Concern    Not on file   Social History Narrative    Not on file     Social Determinants of Health     Financial Resource Strain:     Difficulty of Paying Living Expenses: Not on file   Food Insecurity:     Worried About [address] [employer] in the Last Year: Not on file    [place] of Food in the Last Year: Not on file   Transportation Needs:     Lack of Transportation (Medical): Not on file    Lack of Transportation (Non-Medical):  Not on file   Physical Activity:     Days of Exercise per Week: Not on file    Minutes of Exercise per Session: Not on file   Stress:     Feeling of Stress : Not on file   Social Connections:     Frequency of Communication with Friends and Family: Not on file    Frequency of Social Gatherings with Friends and Family: Not on file    Attends Caoda[employer] Services: Not on file    Active Member of [address] [place] [address] or Organizations: Not on file    Attends Club or Organization Meetings: Not on file    Marital Status: Not on file   Intimate Partner Violence:     Fear of Current or Ex-Partner: Not on file    Emotionally Abused: Not on file    Physically Abused: Not on file    Sexually Abused: Not on file   Housing Stability:     Unable to Pay for Housing in the Last Year: Not on file    Number of [place] in the Last Year: Not on file    Unstable Housing in the Last Year: Not on file       CURRENT MEDICATIONS:     Current Outpatient Medications   Medication Sig Dispense Refill    donepezil (ARICEPT) 5 MG tablet Take 1 tablet by mouth nightly 30 tablet 0    donepezil (ARICEPT) 10 MG tablet Take 1 tablet by mouth nightly 90 tablet 3    amLODIPine (NORVASC) 2.5 MG tablet Take 1 tablet by mouth daily 90 tablet 3    pantoprazole (PROTONIX) 20 MG tablet Take 1 tablet by mouth every morning (before breakfast) 30 tablet 1    tamsulosin (FLOMAX) 0.4 MG capsule Take 1 capsule by mouth daily 90 capsule 11    meloxicam (MOBIC) 7.5 MG tablet Take 1 tablet by mouth daily 90 tablet 5    simvastatin (ZOCOR) 20 MG tablet TAKE 1 TABLET NIGHTLY 90 tablet 1    glimepiride (AMARYL) 2 MG tablet Take 2 tablets by mouth every morning 180 tablet 5    gabapentin (NEURONTIN) 300 MG capsule Take 1 capsule by mouth 2 times daily for 30 days. 180 capsule 5    sitaGLIPtan-metFORMIN (JANUMET)  MG per tablet Janumet  MG Oral Tablet TAKE 1 TABLET TWICE DAILY WITH MEALS. Refills: 0 Active 180 tablet 5    zoster recombinant adjuvanted vaccine (SHINGRIX) 50 MCG/0.5ML SUSR injection 50 MCG IM then repeat 2-6 months. 0.5 mL 1     No current facility-administered medications for this visit. ALLERGIES:   No Known Allergies                              REVIEW OF SYSTEMS        All items selected indicate a positive finding. Those items not selected are negative.   Constitutional [] Weight loss/gain   [] Fatigue  [] Fever/Chills   HEENT [] Hearing Loss  [] Visual Disturbance  [] Tinnitus  [] Eye pain   Respiratory [] Shortness of Breath  [] Cough  [] Snoring   Cardiovascular [] Chest Pain  [] Palpitations  [] Lightheaded   GI [] Constipation  [] Diarrhea  [] Swallowing change  [] Nausea/vomiting    [] Urinary Frequency  [] Urinary Urgency   Musculoskeletal [] Neck pain  [] Back pain  [] Muscle pain  [] Restless legs   Dermatologic [] Skin changes   Neurologic [x] Memory loss/confusion  [] Seizures  [] Trouble walking or imbalance  [] Dizziness  [] Sleep disturbance  [] Weakness  [x] Numbness  [] Tremors  [] Speech Difficulty  [] Headaches  [] Light Sensitivity  [] Sound Sensitivity   Endocrinology []Excessive thirst  []Excessive hunger   Psychiatric [] Anxiety/Depression  [] Hallucination   Allergy/immunology []Hives/environmental allergies   Hematologic/lymph [] Abnormal bleeding  [] Abnormal bruising         PHYSICAL EXAMINATION:       Vitals:    12/01/21 1153   BP: 134/74   Pulse: 69                                              .                                                                                                    General Appearance:  Alert, cooperative, no signs of distress, appears stated age   Head:  Normocephalic, no signs of trauma   Eyes:  Conjunctiva/corneas clear;  eyelids intact   Ears:  Normal external ear and canals   Nose: Nares normal, mucosa normal, no drainage    Throat: Lips and tongue normal; teeth normal;  gums normal   Neck: Supple, intact flexion, extension and rotation;   trachea midline;  no adenopathy;   thyroid: not enlarged;   no carotid pulse abnormality   Back:   Symmetric, no curvature, ROM adequate   Lungs:   Respirations unlabored   Heart:  Regular rate and rhythm           Extremities: Extremities normal, no cyanosis, no edema   Pulses: Symmetric over head and neck   Skin: Skin color, texture normal, no rashes, no lesions                                     NEUROLOGIC EXAMINATION    Neurologic Exam  Mental status    Alert and oriented x 3; intact memory with no confusion, speech or language problems; no hallucinations or delusions  Fund of information appropriate for level of education    Cranial nerves    II - visual fields intact to confrontation bilaterally  III, IV, VI - extra-ocular muscles full: no pupillary defect; no MARII, no nystagmus, no ptosis   V - normal facial sensation                                                               VII - normal facial symmetry VIII - intact hearing                                                                             IX, X - symmetrical palate                                                                  XI - symmetrical shoulder shrug                                                       XII - tongue midline without atrophy or fasciculation      Motor function  Normal muscle bulk and tone; strength 5/5 on all 4 extremities, no pronator drift      Sensory function Intact to light touch, pinprick, vibration, proprioception on all 4 extremities      Cerebellar Intact fine motor movement. No involuntary movements or tremors. No ataxia or dysmetria on finger to nose or heel to shin testing      Reflex function DTR 2+ on bilateral UE and LE, symmetric. Down going toes bilaterally      Gait                   normal base and arm swing                  Medical Decision Making: In summary, your patient, Kings King exhibits the following, with associated plan:    1. Mild cognitive impairment with a score of 28/30 on MMSE at a previous visit. The patient has stroke risk factors including essential hypertension, hyperlipidemia and type 2 diabetes. A recent MRI of his brain showed moderate atrophy. 1. Start aricept 5 mg at bedtime daily for one month titrating to 10 mg at bedtime daily thereafter  2. Obtain previously ordered vitamin B12 level, T. pallidum antibodies and TSH level  3. Return for follow up visit in 4 months  2. Diabetic neuropathy with severely diminished vibration, temperature and pinprick sensation in a glove stocking distribution to the bilateral ankles  1.  Continue gabapentin 300 mg twice daily      Signed: Darrel Knapp CNP      *Please note that portions of this note were completed with a voice recognition program.  Although every effort was made to insure the accuracy of this automated transcription, some errors in transcription may have occurred, occasionally words and are mis-transcribed    Provider Attestation: The documentation recorded by the scribe accurately reflects the service I personally performed and the decisions made by myself. Portions of this note were transcribed by a scribe. I personally performed the history, physical exam, and medical decision-making and confirm the accuracy of the information in the transcribed note. Scribe Attestation:   By signing my name below, Han Sonia, attest that this documentation has been prepared under the direction and in the presence of Mark Broussard CNP.

## 2021-12-10 ENCOUNTER — OFFICE VISIT (OUTPATIENT)
Dept: FAMILY MEDICINE CLINIC | Age: 76
End: 2021-12-10
Payer: MEDICARE

## 2021-12-10 VITALS
RESPIRATION RATE: 18 BRPM | BODY MASS INDEX: 27.3 KG/M2 | SYSTOLIC BLOOD PRESSURE: 136 MMHG | OXYGEN SATURATION: 96 % | DIASTOLIC BLOOD PRESSURE: 82 MMHG | HEIGHT: 71 IN | HEART RATE: 82 BPM | WEIGHT: 195 LBS

## 2021-12-10 DIAGNOSIS — G89.29 CHRONIC MIDLINE LOW BACK PAIN WITHOUT SCIATICA: ICD-10-CM

## 2021-12-10 DIAGNOSIS — L60.8 TOENAIL DEFORMITY: ICD-10-CM

## 2021-12-10 DIAGNOSIS — I10 HYPERTENSION, UNSPECIFIED TYPE: Primary | ICD-10-CM

## 2021-12-10 DIAGNOSIS — M54.50 CHRONIC MIDLINE LOW BACK PAIN WITHOUT SCIATICA: ICD-10-CM

## 2021-12-10 DIAGNOSIS — E11.40 TYPE 2 DIABETES MELLITUS WITH DIABETIC NEUROPATHY, WITHOUT LONG-TERM CURRENT USE OF INSULIN (HCC): ICD-10-CM

## 2021-12-10 DIAGNOSIS — E11.9 TYPE 2 DIABETES MELLITUS WITHOUT COMPLICATION, WITHOUT LONG-TERM CURRENT USE OF INSULIN (HCC): ICD-10-CM

## 2021-12-10 DIAGNOSIS — E78.5 HYPERLIPIDEMIA, UNSPECIFIED HYPERLIPIDEMIA TYPE: ICD-10-CM

## 2021-12-10 DIAGNOSIS — R39.11 URINARY HESITANCY: ICD-10-CM

## 2021-12-10 DIAGNOSIS — K21.9 GASTROESOPHAGEAL REFLUX DISEASE WITHOUT ESOPHAGITIS: ICD-10-CM

## 2021-12-10 PROCEDURE — 1123F ACP DISCUSS/DSCN MKR DOCD: CPT | Performed by: NURSE PRACTITIONER

## 2021-12-10 PROCEDURE — 4040F PNEUMOC VAC/ADMIN/RCVD: CPT | Performed by: NURSE PRACTITIONER

## 2021-12-10 PROCEDURE — 99213 OFFICE O/P EST LOW 20 MIN: CPT | Performed by: NURSE PRACTITIONER

## 2021-12-10 PROCEDURE — G8417 CALC BMI ABV UP PARAM F/U: HCPCS | Performed by: NURSE PRACTITIONER

## 2021-12-10 PROCEDURE — G8484 FLU IMMUNIZE NO ADMIN: HCPCS | Performed by: NURSE PRACTITIONER

## 2021-12-10 PROCEDURE — G8427 DOCREV CUR MEDS BY ELIG CLIN: HCPCS | Performed by: NURSE PRACTITIONER

## 2021-12-10 PROCEDURE — 1036F TOBACCO NON-USER: CPT | Performed by: NURSE PRACTITIONER

## 2021-12-10 RX ORDER — OMEPRAZOLE 40 MG/1
40 CAPSULE, DELAYED RELEASE ORAL
Qty: 90 CAPSULE | Refills: 1 | Status: SHIPPED | OUTPATIENT
Start: 2021-12-10 | End: 2022-06-04

## 2021-12-10 RX ORDER — FINASTERIDE 5 MG/1
5 TABLET, FILM COATED ORAL DAILY
Qty: 90 TABLET | Refills: 0 | Status: SHIPPED | OUTPATIENT
Start: 2021-12-10 | End: 2022-03-14 | Stop reason: SDUPTHER

## 2021-12-10 ASSESSMENT — ENCOUNTER SYMPTOMS
SORE THROAT: 0
DIARRHEA: 0
VOMITING: 0
BACK PAIN: 0
SHORTNESS OF BREATH: 0
COUGH: 0
EYE PAIN: 0
SINUS PAIN: 0
NAUSEA: 0
ABDOMINAL PAIN: 0

## 2021-12-10 NOTE — PROGRESS NOTES
[address] [facility]  [address] [facility] [address] [address]  Dept: [phone]  Dept Fax: [phone]    Chilo Garner is a 68 y.o. male who presents today for his medicalconditions/complaints as noted below. Chilo Garner is c/o of 3 Month Follow-Up      HPI:       68. Y.o for follow up. Current complaints include progressive memory decline. Reports that for the past several years he has noticed slightly worsening memory. Reports that over the past 5 months this has worsened significantly. Reports that his wife will tell him to go to the store and by the time you the story he forgot what he was going there for. Had been referred to neurology, MRI, aricept, memory stable not improving or worsening.     Reports that he had a nail that fell off of the left second toe. Reports the nail was abnormally thickened and was unable to be trimmed and did fall off.     History of type 2 diabetes takes Janumet  bid, glimepiride 4 mg daily. Last A1c 6.7     History hypertension takes amlodpine 2.5 mg, bp well controlled today     History hyperlipidemia takes Zocor 20 mg, last lipid stable.     History of generalized arthritis and neuropathy takes gabapentin 300 twice daily and meloxicam. Ongoing pain lower back , no imaging, no injury or trauma. Gerd, omeprazole seemed to work better for than protonix    Urinary frequency and hesitancy, incomplete emptying.  Had trialed flomax without improvement.         Past Medical History:   Diagnosis Date    Depression     Diabetes mellitus (HCC)     Hypertension     Neuropathy         Current Outpatient Medications   Medication Sig Dispense Refill    finasteride (PROSCAR) 5 MG tablet Take 1 tablet by mouth daily 90 tablet 0    omeprazole (PRILOSEC) 40 MG delayed release capsule Take 1 capsule by mouth every morning (before breakfast) 90 capsule 1    donepezil (ARICEPT) 5 MG tablet Take 1 tablet by mouth nightly 30 tablet 0    donepezil (ARICEPT) 10 MG tablet Take 1 tablet by mouth nightly 90 tablet 3    amLODIPine (NORVASC) 2.5 MG tablet Take 1 tablet by mouth daily 90 tablet 3    tamsulosin (FLOMAX) 0.4 MG capsule Take 1 capsule by mouth daily 90 capsule 11    meloxicam (MOBIC) 7.5 MG tablet Take 1 tablet by mouth daily 90 tablet 5    simvastatin (ZOCOR) 20 MG tablet TAKE 1 TABLET NIGHTLY 90 tablet 1    glimepiride (AMARYL) 2 MG tablet Take 2 tablets by mouth every morning 180 tablet 5    gabapentin (NEURONTIN) 300 MG capsule Take 1 capsule by mouth 2 times daily for 30 days. 180 capsule 5    sitaGLIPtan-metFORMIN (JANUMET)  MG per tablet Janumet  MG Oral Tablet TAKE 1 TABLET TWICE DAILY WITH MEALS. Refills: 0 Active 180 tablet 5    zoster recombinant adjuvanted vaccine (SHINGRIX) 50 MCG/0.5ML SUSR injection 50 MCG IM then repeat 2-6 months. 0.5 mL 1     No current facility-administered medications for this visit. No Known Allergies    Subjective:      Review of Systems   Constitutional: Negative for chills and fatigue. HENT: Negative for congestion, ear pain, sinus pain and sore throat. Eyes: Negative for pain and visual disturbance. Respiratory: Negative for cough and shortness of breath. Cardiovascular: Negative for chest pain and palpitations. Gastrointestinal: Negative for abdominal pain, diarrhea, nausea and vomiting. Genitourinary: Positive for difficulty urinating and frequency. Negative for penile pain and testicular pain. Musculoskeletal: Negative for back pain, joint swelling and neck pain. Skin: Negative for rash. Neurological: Negative for dizziness and light-headedness. Hematological: Does not bruise/bleed easily. Psychiatric/Behavioral: Positive for confusion. All other systems reviewed and are negative.      :Objective     Physical Exam  Vitals and nursing note reviewed. Constitutional:       General: He is not in acute distress.

## 2021-12-10 NOTE — PATIENT INSTRUCTIONS
Patient Education        Counting Carbohydrates for Diabetes: Care Instructions  Your Care Instructions     You don't have to eat special foods when you have diabetes. You just have to be careful to eat healthy foods. Carbohydrates (carbs) raise blood sugar higher and quicker than any other nutrient. Carbs are found in desserts, breads and cereals, and fruit. They're also in starchy vegetables. These include potatoes, corn, and grains such as rice and pasta. Carbs are also in milk and yogurt. The more carbs you eat at one time, the higher your blood sugar will rise. Spreading carbs all through the day helps keep your blood sugar levels within your target range. Counting carbs is one of the best ways to keep your blood sugar under control. If you use insulin, counting carbs helps you match the right amount of insulin to the number of grams of carbs in a meal. Then you can change your diet and insulin dose as needed. Testing your blood sugar several times a day can help you learn how carbs affect your blood sugar. A registered dietitian or certified diabetes educator can help you plan meals and snacks. Follow-up care is a key part of your treatment and safety. Be sure to make and go to all appointments, and call your doctor if you are having problems. It's also a good idea to know your test results and keep a list of the medicines you take. How can you care for yourself at home? Know your daily amount of carbohydrates  Your daily amount depends on several things, such as your weight, how active you are, which diabetes medicines you take, and what your goals are for your blood sugar levels. A registered dietitian or certified diabetes educator can help you plan how many carbs to include in each meal and snack. For most adults, a guideline for the daily amount of carbs is:  · 45 to 60 grams at each meal. That's about the same as 3 to 4 carbohydrate servings. · 15 to 20 grams at each snack.  That's about the same as 1 carbohydrate serving. Count carbs  Counting carbs lets you know how much rapid-acting insulin to take before you eat. If you use an insulin pump, you get a constant rate of insulin during the day. So the pump must be programmed at meals. This gives you extra insulin to cover the rise in blood sugar after meals. If you take insulin:  · Learn your own insulin-to-carb ratio. You and your diabetes health professional will figure out the ratio. You can do this by testing your blood sugar after meals. For example, you may need a certain amount of insulin for every 15 grams of carbs. · Add up the carb grams in a meal. Then you can figure out how many units of insulin to take based on your insulin-to-carb ratio. · Exercise lowers blood sugar. You can use less insulin than you would if you were not doing exercise. Keep in mind that timing matters. If you exercise within 1 hour after a meal, your body may need less insulin for that meal than it would if you exercised 3 hours after the meal. Test your blood sugar to find out how exercise affects your need for insulin. If you do or don't take insulin:  · Look at labels on packaged foods. This can tell you how many carbs are in a serving. You can also use guides from the American Diabetes Association. · Be aware of portions, or serving sizes. If a package has two servings and you eat the whole package, you need to double the number of grams of carbohydrate listed for one serving. · Protein, fat, and fiber do not raise blood sugar as much as carbs do. If you eat a lot of these nutrients in a meal, your blood sugar will rise more slowly than it would otherwise. Eat from all food groups  · Eat at least three meals a day. · Plan meals to include food from all the food groups. The food groups include grains, fruits, dairy, proteins, and vegetables. · Talk to your dietitian or diabetes educator about ways to add limited amounts of sweets into your meal plan.   · If you drink alcohol, talk to your doctor. It may not be recommended when you are taking certain diabetes medicines. Where can you learn more? Go to https://[employer]. org and sign in to your [employer] account. Enter V654 in the [facility] box to learn more about \"Counting Carbohydrates for Diabetes: Care Instructions. \"     If you do not have an account, please click on the \"Sign Up Now\" link. Current as of: August 31, 2020               Content Version: 13.0  © 3678-3343 Healthwise, Incorporated. Care instructions adapted under license by [facility] ([facility]). If you have questions about a medical condition or this instruction, always ask your healthcare professional. [address] any warranty or liability for your use of this information.

## 2022-01-07 ENCOUNTER — TELEPHONE (OUTPATIENT)
Dept: FAMILY MEDICINE CLINIC | Age: 77
End: 2022-01-07

## 2022-01-07 DIAGNOSIS — R11.0 NAUSEA: Primary | ICD-10-CM

## 2022-01-07 NOTE — TELEPHONE ENCOUNTER
Patient called stating he was tested for covid and was positive for covid. He is having nausea. Can you send over over zofran for him?

## 2022-01-08 RX ORDER — ONDANSETRON 4 MG/1
4 TABLET, FILM COATED ORAL 3 TIMES DAILY PRN
Qty: 15 TABLET | Refills: 0 | Status: SHIPPED | OUTPATIENT
Start: 2022-01-08

## 2022-01-12 ENCOUNTER — TELEPHONE (OUTPATIENT)
Dept: FAMILY MEDICINE CLINIC | Age: 77
End: 2022-01-12

## 2022-01-12 NOTE — TELEPHONE ENCOUNTER
----- Message from Dee Mooneywell sent at 1/12/2022 12:45 PM EST -----  Subject: Message to Provider    QUESTIONS  Information for Provider? Patient wants to know if office is giving   booster shot. Please advise   ---------------------------------------------------------------------------  --------------  CALL BACK INFO  What is the best way for the office to contact you? OK to leave message on   voicemail  Preferred Call Back Phone Number? [phone]  ---------------------------------------------------------------------------  --------------  SCRIPT ANSWERS  Relationship to Patient?  Self

## 2022-01-17 ENCOUNTER — TELEPHONE (OUTPATIENT)
Dept: FAMILY MEDICINE CLINIC | Age: 77
End: 2022-01-17

## 2022-01-17 NOTE — TELEPHONE ENCOUNTER
----- Message from Ford Key sent at 1/17/2022  2:31 PM EST -----  Subject: Message to Provider    QUESTIONS  Information for Provider? positive for covid; wants to know when they can   schedule to get their booster shot, they are feeling better now   ---------------------------------------------------------------------------  --------------  CALL BACK INFO  What is the best way for the office to contact you? OK to leave message on   voicemail  Preferred Call Back Phone Number? [phone]  ---------------------------------------------------------------------------  --------------  SCRIPT ANSWERS  Relationship to Patient?  Self

## 2022-01-19 ENCOUNTER — OFFICE VISIT (OUTPATIENT)
Dept: PODIATRY | Age: 77
End: 2022-01-19
Payer: MEDICARE

## 2022-01-19 VITALS — WEIGHT: 195 LBS | RESPIRATION RATE: 16 BRPM | BODY MASS INDEX: 27.92 KG/M2 | HEIGHT: 70 IN

## 2022-01-19 DIAGNOSIS — M79.605 PAIN IN BOTH LOWER EXTREMITIES: ICD-10-CM

## 2022-01-19 DIAGNOSIS — B35.1 DERMATOPHYTOSIS OF NAIL: Primary | ICD-10-CM

## 2022-01-19 DIAGNOSIS — M79.604 PAIN IN BOTH LOWER EXTREMITIES: ICD-10-CM

## 2022-01-19 DIAGNOSIS — E11.51 TYPE II DIABETES MELLITUS WITH PERIPHERAL CIRCULATORY DISORDER (HCC): ICD-10-CM

## 2022-01-19 DIAGNOSIS — E08.42 DIABETIC POLYNEUROPATHY ASSOCIATED WITH DIABETES MELLITUS DUE TO UNDERLYING CONDITION (HCC): ICD-10-CM

## 2022-01-19 PROCEDURE — G8427 DOCREV CUR MEDS BY ELIG CLIN: HCPCS | Performed by: PODIATRIST

## 2022-01-19 PROCEDURE — G8417 CALC BMI ABV UP PARAM F/U: HCPCS | Performed by: PODIATRIST

## 2022-01-19 PROCEDURE — 1036F TOBACCO NON-USER: CPT | Performed by: PODIATRIST

## 2022-01-19 PROCEDURE — 99203 OFFICE O/P NEW LOW 30 MIN: CPT | Performed by: PODIATRIST

## 2022-01-19 PROCEDURE — 11721 DEBRIDE NAIL 6 OR MORE: CPT | Performed by: PODIATRIST

## 2022-01-19 PROCEDURE — G8484 FLU IMMUNIZE NO ADMIN: HCPCS | Performed by: PODIATRIST

## 2022-01-19 PROCEDURE — 4040F PNEUMOC VAC/ADMIN/RCVD: CPT | Performed by: PODIATRIST

## 2022-01-19 PROCEDURE — 1123F ACP DISCUSS/DSCN MKR DOCD: CPT | Performed by: PODIATRIST

## 2022-01-19 NOTE — PROGRESS NOTES
[address] [facility] PODIATRY [facility]  [address] [address] [address]  Dept: [phone]  Dept Fax: [phone]    NEW PATIENT PROGRESS NOTE  Date of patient's visit: 1/19/2022  Patient's Name:  Sreedhar Martinez YOB: 1945            Patient Care Team:  CRISTINA Warren CNP as PCP - General (Certified Nurse Practitioner)  CRISTINA Warren CNP as PCP - [facility]  Erica Lozoya DPM as Physician (Podiatry)        Chief Complaint   Patient presents with    New Patient    Diabetes    Peripheral Neuropathy    Nail Problem         HPI:   Sreedhar Martinez is a 68 y.o. male who presents to the office today complaining of needing nail care. Symptoms began 6 month(s) ago. Patient relates pain is present. Pain is rated 2 out of 10 and is described as none. Treatments prior to today's visit include: none. Currently denies F/C/N/V. Pt's primary care physician is CRISTINA Warren CNP last seen 12/10/2021     No Known Allergies    Past Medical History:   Diagnosis Date    Depression     Diabetes mellitus ([facility] [address]     Hypertension     Neuropathy        Prior to Admission medications    Medication Sig Start Date End Date Taking?  Authorizing Provider   ondansetron (ZOFRAN) 4 MG tablet Take 1 tablet by mouth 3 times daily as needed for Nausea or Vomiting 1/8/22  Yes CRISTINA Warren CNP   finasteride (PROSCAR) 5 MG tablet Take 1 tablet by mouth daily 12/10/21  Yes CRISTINA Warren CNP   omeprazole (PRILOSEC) 40 MG delayed release capsule Take 1 capsule by mouth every morning (before breakfast) 12/10/21  Yes CRISTINA Warren CNP   donepezil (ARICEPT) 5 MG tablet Take 1 tablet by mouth nightly 12/1/21  Yes CRISTINA Payne CNP   donepezil (ARICEPT) 10 MG tablet Take 1 tablet by mouth nightly 12/1/21  Yes CRISTINA Payne CNP   amLODIPine (NORVASC) 2.5 MG tablet Take 1 tablet by mouth daily 21  Yes CRISTINA Griffin - CNP   tamsulosin [facility]) 0.4 MG capsule Take 1 capsule by mouth daily 21  Yes CRISTINA Griffin CNP   meloxicam (MOBIC) 7.5 MG tablet Take 1 tablet by mouth daily 21  Yes CRISTINA Griffin CNP   simvastatin (ZOCOR) 20 MG tablet TAKE 1 TABLET NIGHTLY 21  Yes CRISTINA Griffin CNP   glimepiride (AMARYL) 2 MG tablet Take 2 tablets by mouth every morning 21  Yes CRISTINA Griffin CNP   sitaGLIPtan-metFORMIN (JANUMET)  MG per tablet Janumet  MG Oral Tablet TAKE 1 TABLET TWICE DAILY WITH MEALS. Refills: 0 Active 21  Yes Huang Cardoso DO   gabapentin (NEURONTIN) 300 MG capsule Take 1 capsule by mouth 2 times daily for 30 days. 4/21/21 12/10/21  Huang Cardoso DO       Past Surgical History:   Procedure Laterality Date    KNEE SURGERY         Family History   Problem Relation Age of Onset    Cancer Mother     Emphysema Father     Diabetes Brother     High Cholesterol Brother     Cancer Brother        Social History     Tobacco Use    Smoking status: Former Smoker     Packs/day: 1.00     Years: 15.00     Pack years: 15.00     Types: Cigarettes     Start date: 1972     Quit date:      Years since quittin.0    Smokeless tobacco: Never Used   Substance Use Topics    Alcohol use: Not Currently       Review of Systems    Review of Systems:   History obtained from chart review and the patient  General ROS: negative for - chills, fatigue, fever, night sweats or weight gain  Constitutional: Negative for chills, diaphoresis, fatigue, fever and unexpected weight change. Musculoskeletal: Positive for arthralgias, gait problem and joint swelling. Neurological ROS: negative for - behavioral changes, confusion, headaches or seizures. Negative for weakness and numbness. Dermatological ROS: negative for - mole changes, rash  Cardiovascular: Negative for leg swelling.    Gastrointestinal: Negative for constipation, diarrhea, nausea and vomiting. Lower Extremity Physical Examination:   Vitals:   Vitals:    01/19/22 1322   Resp: 16     General: AAO x 3 in NAD. Dermatologic Exam:  Skin lesion/ulceration Absent . Skin No rashes or nodules noted. .   Skin is thin, with flaky sloughing skin as well as decreased hair growth to the lower leg  Small red hemosiderin deposits seen dorsal foot   Musculoskeletal:     1st MPJ ROM decreased, Bilateral.  Muscle strength 5/5, Bilateral.  Pain present upon palpation of toenails 1-5, Bilateral. decreased medial longitudinal arch, Bilateral.  Ankle ROM decreased,Bilateral.    Dorsally contracted digits present digits 2, Bilateral.     Vascular: DP pulses 1/4 bilateral.  PT pulses 0/4 bilateral.   CFT <5 seconds, Bilateral.  Hair growth absent to the level of the digits, Bilateral.  Edema present, Bilateral.  Varicosities absent, Bilateral. Erythema absent, Bilateral    Neurological: Sensation diminshed to light touch to level of digits, Bilateral.  Protective sensation intact 6/10 sites via 5.07/10g [place]-Sung Monofilament, Bilateral.  negative Tinel's, Bilateral.  negative Valleix sign, Bilateral.      Integument: Warm, dry, supple, Bilateral.  Open lesion absent, Bilateral.  Interdigital maceration absent to web spaces 4, Bilateral.  Nails 1-5 left and 1-5 right thickened > 3.0 mm, dystrophic and crumbly, discolored with yellow subungual debris. Fissures absent, Bilateral.       Asessment: Patient is a 68 y.o. male with:    Diagnosis Orders   1. Dermatophytosis of nail   DIABETES FOOT EXAM    43941 - ME DEBRIDEMENT OF NAILS, 6 OR MORE   2. Type II diabetes mellitus with peripheral circulatory disorder (HCC)   DIABETES FOOT EXAM    63396 - ME DEBRIDEMENT OF NAILS, 6 OR MORE   3.  Diabetic polyneuropathy associated with diabetes mellitus due to underlying condition (HCC)   DIABETES FOOT EXAM    12675 - ME DEBRIDEMENT OF NAILS, 6 OR MORE 4. Pain in both lower extremities   DIABETES FOOT EXAM    25446 - [place] DEBRIDEMENT OF NAILS, 6 OR MORE       Plan: Patient examined and evaluated. Current condition and treatment options discussed in detail. Discussed conservative and surgical options with the patient. Nails 1,2,3,4,5 Right and 1,2,3,4,5 Left were debrided and ground smooth with a dremmel. The patient tolerated the procedure well without apparent complications. All labs were reviewed and all imagining including the above findings were reviewed PRIOR to the patients arrival and with the patient today. Previous patient encounter was reviewed. Encounters from the patients other medical providers were reviewed and noted. Time was spent educating the patient and their families/caregivers on proper care of the feet and ankles. All the above diagnosis were addressed at todays visit and all questions were answered. A total of 30 minutes was spent with this patients encounter which included charting after the patients visit    Verbal and written instructions given to patient. Contact office with any questions/problems/concerns. RTC in 2month(s).     1/19/2022    Electronically signed by Will Pack DPM on 1/19/2022 at 1:25 PM  1/19/2022

## 2022-01-28 ENCOUNTER — TELEPHONE (OUTPATIENT)
Dept: FAMILY MEDICINE CLINIC | Age: 77
End: 2022-01-28

## 2022-01-28 NOTE — TELEPHONE ENCOUNTER
----- Message from Reinier Coy sent at 1/28/2022 12:58 PM EST -----  Subject: Message to Provider    QUESTIONS  Information for Provider? Pt of Dr. Therese Loaiza called to request letter of   medical necessity for diabetic testing supplies. Letter is for [facility] to obtain medical supplies as pt tests three   times per day. Pt can  letter when ready. ---------------------------------------------------------------------------  --------------  Alexey RYDER  What is the best way for the office to contact you? OK to leave message on   voicemail  Preferred Call Back Phone Number? [phone]  ---------------------------------------------------------------------------  --------------  SCRIPT ANSWERS  Relationship to Patient?  Self

## 2022-01-28 NOTE — LETTER
[address] [place] [address]  Phone: [phone]  Fax: [phone]    CRISTINA Randhawa CNP        January 28, 2022     Patient: Braulio Fuentes   YOB: 1945   Date of Visit: 1/28/2022       To Whom It May Concern: It is my medical opinion that Nazanin Wise is recommended to check his blood glucose three times daily due to his history of diabetes and fluctuating blood glucose levels. If you have any questions or concerns, please don't hesitate to call.     Sincerely,        CRISTINA Randhawa CNP

## 2022-02-10 ENCOUNTER — TELEPHONE (OUTPATIENT)
Dept: FAMILY MEDICINE CLINIC | Age: 77
End: 2022-02-10

## 2022-02-10 NOTE — TELEPHONE ENCOUNTER
----- Message from Quoc Hussein sent at 2/9/2022  2:16 PM EST -----  Subject: Message to Provider    QUESTIONS  Information for Provider? Pt needs to know when his last pneumonia   vaccination was. He was contacted by the [place] to get it done if he had not.   ---------------------------------------------------------------------------  --------------  [address]  What is the best way for the office to contact you? OK to leave message on   voicemail  Preferred Call Back Phone Number? [phone]  ---------------------------------------------------------------------------  --------------  SCRIPT ANSWERS  Relationship to Patient?  Self

## 2022-03-02 DIAGNOSIS — Z76.0 MEDICATION REFILL: ICD-10-CM

## 2022-03-03 RX ORDER — SIMVASTATIN 20 MG
TABLET ORAL
Qty: 90 TABLET | Refills: 3 | Status: SHIPPED | OUTPATIENT
Start: 2022-03-03

## 2022-03-14 ENCOUNTER — OFFICE VISIT (OUTPATIENT)
Dept: FAMILY MEDICINE CLINIC | Age: 77
End: 2022-03-14
Payer: MEDICARE

## 2022-03-14 VITALS
DIASTOLIC BLOOD PRESSURE: 58 MMHG | BODY MASS INDEX: 27.32 KG/M2 | WEIGHT: 190.8 LBS | HEART RATE: 65 BPM | TEMPERATURE: 97.3 F | SYSTOLIC BLOOD PRESSURE: 126 MMHG | HEIGHT: 70 IN | OXYGEN SATURATION: 97 %

## 2022-03-14 DIAGNOSIS — I10 HYPERTENSION, UNSPECIFIED TYPE: ICD-10-CM

## 2022-03-14 DIAGNOSIS — R25.2 LEG CRAMPING: ICD-10-CM

## 2022-03-14 DIAGNOSIS — R53.83 FATIGUE, UNSPECIFIED TYPE: ICD-10-CM

## 2022-03-14 DIAGNOSIS — Z12.5 PROSTATE CANCER SCREENING: ICD-10-CM

## 2022-03-14 DIAGNOSIS — E11.9 TYPE 2 DIABETES MELLITUS WITHOUT COMPLICATION, WITHOUT LONG-TERM CURRENT USE OF INSULIN (HCC): Primary | ICD-10-CM

## 2022-03-14 DIAGNOSIS — R39.11 URINARY HESITANCY: ICD-10-CM

## 2022-03-14 DIAGNOSIS — E78.5 HYPERLIPIDEMIA, UNSPECIFIED HYPERLIPIDEMIA TYPE: ICD-10-CM

## 2022-03-14 LAB — HBA1C MFR BLD: 6.8 %

## 2022-03-14 PROCEDURE — 1123F ACP DISCUSS/DSCN MKR DOCD: CPT | Performed by: NURSE PRACTITIONER

## 2022-03-14 PROCEDURE — 4040F PNEUMOC VAC/ADMIN/RCVD: CPT | Performed by: NURSE PRACTITIONER

## 2022-03-14 PROCEDURE — G8484 FLU IMMUNIZE NO ADMIN: HCPCS | Performed by: NURSE PRACTITIONER

## 2022-03-14 PROCEDURE — G8417 CALC BMI ABV UP PARAM F/U: HCPCS | Performed by: NURSE PRACTITIONER

## 2022-03-14 PROCEDURE — G8427 DOCREV CUR MEDS BY ELIG CLIN: HCPCS | Performed by: NURSE PRACTITIONER

## 2022-03-14 PROCEDURE — 83036 HEMOGLOBIN GLYCOSYLATED A1C: CPT | Performed by: NURSE PRACTITIONER

## 2022-03-14 PROCEDURE — 99213 OFFICE O/P EST LOW 20 MIN: CPT | Performed by: NURSE PRACTITIONER

## 2022-03-14 PROCEDURE — 1036F TOBACCO NON-USER: CPT | Performed by: NURSE PRACTITIONER

## 2022-03-14 RX ORDER — FINASTERIDE 5 MG/1
5 TABLET, FILM COATED ORAL DAILY
Qty: 90 TABLET | Refills: 1 | Status: SHIPPED | OUTPATIENT
Start: 2022-03-14 | End: 2022-09-02

## 2022-03-14 ASSESSMENT — ENCOUNTER SYMPTOMS
DIARRHEA: 0
SORE THROAT: 0
SINUS PAIN: 0
VOMITING: 0
EYE PAIN: 0
BACK PAIN: 0
SHORTNESS OF BREATH: 0
ABDOMINAL PAIN: 0
NAUSEA: 0
COUGH: 0

## 2022-03-14 ASSESSMENT — PATIENT HEALTH QUESTIONNAIRE - PHQ9
2. FEELING DOWN, DEPRESSED OR HOPELESS: 0
SUM OF ALL RESPONSES TO PHQ QUESTIONS 1-9: 0
SUM OF ALL RESPONSES TO PHQ QUESTIONS 1-9: 0
SUM OF ALL RESPONSES TO PHQ9 QUESTIONS 1 & 2: 0
SUM OF ALL RESPONSES TO PHQ QUESTIONS 1-9: 0
1. LITTLE INTEREST OR PLEASURE IN DOING THINGS: 0
SUM OF ALL RESPONSES TO PHQ QUESTIONS 1-9: 0

## 2022-03-14 NOTE — PROGRESS NOTES
Visit Information    Have you changed or started any medications since your last visit including any over-the-counter medicines, vitamins, or herbal medicines? no   Are you having any side effects from any of your medications? -  no  Have you stopped taking any of your medications? Is so, why? -  no    Have you seen any other physician or provider since your last visit? No  Have you had any other diagnostic tests since your last visit? No  Have you been seen in the emergency room and/or had an admission to a hospital since we last saw you? No  Have you had your routine dental cleaning in the past 6 months? no    Have you activated your [employer] account? If not, what are your barriers?  Yes     Patient Care Team:  CRISTINA Calix CNP as PCP - General (Certified Nurse Practitioner)  CRISTINA Calix CNP as PCP - [facility] Provider  Will Pack DPM as Physician (Podiatry)    Medical History Review  Past Medical, Family, and Social History reviewed and does contribute to the patient presenting condition    Health Maintenance   Topic Date Due    DTaP/Tdap/Td vaccine (1 - Tdap) Never done    Shingles Vaccine (1 of 2) Never done    COVID-19 Vaccine (3 - Booster for Mechele Fort series) 07/26/2021    Annual Wellness Visit (AWV)  03/18/2022    Lipid screen  09/10/2022    Depression Screen  03/14/2023    Flu vaccine  Completed    Pneumococcal 65+ years Vaccine  Completed    Hepatitis A vaccine  Aged Out    Hib vaccine  Aged Out    Meningococcal (ACWY) vaccine  Aged Out    Hepatitis C screen  Discontinued

## 2022-03-14 NOTE — PROGRESS NOTES
[address] [address] [facility]  [address]  [address] [facility] [address]  Dept: [phone]  Dept Fax: [phone]    Carolina Kent is a 68 y.o. male who presents today for his medicalconditions/complaints as noted below. Carolina Kent is c/o of Hypertension and Diabetes      HPI:     68. Y.o for follow up. Some leg cramping to left leg in am only ongoing for several months.     Current complaints include progressive memory decline. Reports that for the past several years he has noticed slightly worsening memory. Reports that over the past 5 months this has worsened significantly. Reports that his wife will tell him to go to the store and by the time you the story he forgot what he was going there for. Had been referred to neurology, MRI, aricept, increased to 10 mg, memory stable not improving or worsening.     Reports that he had a nail that fell off of the left second toe. Reports the nail was abnormally thickened and was unable to be trimmed and did fall off. Saw podiatry had nails trimmed.     History of type 2 diabetes takes Janumet  bid, glimepiride 4 mg daily. Last A1c 6.7     History hypertension takes amlodpine 2.5 mg, bp well controlled today     History hyperlipidemia takes Zocor 20 mg, last lipid stable.     History of generalized arthritis and neuropathy takes gabapentin 300 twice daily and meloxicam 7.5. Ongoing pain lower back , no imaging completed, no injury or trauma.     Gerd, omeprazole 40 mgseemed to work better for than protonix     Urinary frequency and hesitancy, incomplete emptying. Had trialed flomax , proscar without improvement. Last PSA had improved.       Past Medical History:   Diagnosis Date    Depression     Diabetes mellitus (HCC)     Hypertension     Neuropathy         Current Outpatient Medications   Medication Sig Dispense Refill    finasteride (PROSCAR) 5 MG tablet Take 1 tablet by mouth daily 90 tablet 1  sitaGLIPtan-metFORMIN (JANUMET)  MG per tablet Janumet  MG Oral Tablet TAKE 1 TABLET TWICE DAILY WITH MEALS. Refills: 0 Active 180 tablet 5    simvastatin (ZOCOR) 20 MG tablet TAKE 1 TABLET NIGHTLY 90 tablet 3    ondansetron (ZOFRAN) 4 MG tablet Take 1 tablet by mouth 3 times daily as needed for Nausea or Vomiting 15 tablet 0    omeprazole (PRILOSEC) 40 MG delayed release capsule Take 1 capsule by mouth every morning (before breakfast) 90 capsule 1    donepezil (ARICEPT) 10 MG tablet Take 1 tablet by mouth nightly 90 tablet 3    amLODIPine (NORVASC) 2.5 MG tablet Take 1 tablet by mouth daily 90 tablet 3    tamsulosin (FLOMAX) 0.4 MG capsule Take 1 capsule by mouth daily 90 capsule 11    meloxicam (MOBIC) 7.5 MG tablet Take 1 tablet by mouth daily 90 tablet 5    glimepiride (AMARYL) 2 MG tablet Take 2 tablets by mouth every morning 180 tablet 5    gabapentin (NEURONTIN) 300 MG capsule Take 1 capsule by mouth 2 times daily for 30 days. 180 capsule 5     No current facility-administered medications for this visit. No Known Allergies    Subjective:      Review of Systems   Constitutional: Positive for fatigue. Negative for chills. HENT: Negative for congestion, ear pain, sinus pain and sore throat. Eyes: Negative for pain and visual disturbance. Respiratory: Negative for cough and shortness of breath. Cardiovascular: Negative for chest pain and palpitations. Gastrointestinal: Negative for abdominal pain, diarrhea, nausea and vomiting. Genitourinary: Negative for penile pain and testicular pain. Musculoskeletal: Positive for myalgias. Negative for back pain, joint swelling and neck pain. Skin: Negative for rash. Neurological: Negative for dizziness and light-headedness. Hematological: Does not bruise/bleed easily. All other systems reviewed and are negative.      :Objective     Physical Exam  Vitals and nursing note reviewed.    Constitutional:       Appearance: Normal appearance. HENT:      Nose: Nose normal.      Mouth/Throat:      Mouth: Mucous membranes are moist.   Cardiovascular:      Rate and Rhythm: Normal rate. Pulmonary:      Effort: Pulmonary effort is normal.      Breath sounds: Normal breath sounds. Skin:     General: Skin is warm and dry. Neurological:      General: No focal deficit present. Mental Status: He is alert and oriented to person, place, and time. BP (!) 126/58 (Site: Right Upper Arm, Position: Sitting, Cuff Size: Large Adult)   Pulse 65   Temp 97.3 °F (36.3 °C) (Tympanic)   Ht 5' 10\" (1.778 m)   Wt 190 lb 12.8 oz (86.5 kg)   SpO2 97%   BMI 27.38 kg/m²     Lab Review   Hospital Outpatient Visit on 09/20/2021   Component Date Value    Potassium 09/20/2021 5.4*       Assessment and Plan      1. Type 2 diabetes mellitus without complication, without long-term current use of insulin (HCC)  -     POCT glycosylated hemoglobin (Hb A1C)  -     CBC with Auto Differential; Future  -     sitaGLIPtan-metFORMIN (JANUMET)  MG per tablet; Janumet  MG Oral Tablet TAKE 1 TABLET TWICE DAILY WITH MEALS. Refills: 0 Active, Disp-180 tablet, R-5Normal  2. Urinary hesitancy  -     finasteride (PROSCAR) 5 MG tablet; Take 1 tablet by mouth daily, Disp-90 tablet, R-1Normal  -     CBC with Auto Differential; Future  3. Hypertension, unspecified type  -     CBC with Auto Differential; Future  -     TSH With Reflex Ft4; Future  4. Prostate cancer screening  -     PSA Screening; Future  -     CBC with Auto Differential; Future  5. Leg cramping  -     Comprehensive Metabolic Panel; Future  -     CK; Future  -     CBC with Auto Differential; Future  -     Vitamin D 25 Hydroxy; Future  -     Vitamin B12; Future  -     T. Pallidum Ab; Future  6. Hyperlipidemia, unspecified hyperlipidemia type  -     CBC with Auto Differential; Future  -     Lipid Panel; Future  7. Fatigue, unspecified type  -     Vitamin D 25 Hydroxy;  Future  -     Vitamin B12; Future  -     T. Pallidum Ab; Future       Labs ordered  Encouraged to complete x-ray  Refill on Proscar to take in addition of Flomax will evaluate PSA regarding BPH symptoms  Refill on Janumet to express discussed call if nearly out prior to refills made. F/u prn          Results for orders placed or performed in visit on 03/14/22   POCT glycosylated hemoglobin (Hb A1C)   Result Value Ref Range    Hemoglobin A1C 6.8 %             Return in about 3 months (around 6/14/2022), or if symptoms worsen or fail to improve. Orders Placed This Encounter   Medications    finasteride (PROSCAR) 5 MG tablet     Sig: Take 1 tablet by mouth daily     Dispense:  90 tablet     Refill:  1    sitaGLIPtan-metFORMIN (JANUMET)  MG per tablet     Sig: Janumet  MG Oral Tablet TAKE 1 TABLET TWICE DAILY WITH MEALS. Refills: 0 Active     Dispense:  180 tablet     Refill:  5        Patient given educational materials - see patient instructions. Discussed use, benefit, and side effects of prescribed medications. All patientquestions answered. Pt voiced understanding. Patient given educational materials - see patient instructions. Discussed use, benefit, and side effects of prescribed medications. All patientquestions answered. Pt voiced understanding. This note was transcribed using dictation with Dragon services. Efforts were made to correct any errors but some words may be misinterpreted.     Patient assumes risks associated with failure to complete recommended testing and treatments in a timely manner    Electronically signed by CRISTINA Norris CNP on 3/14/2022at 2:40 PM

## 2022-03-14 NOTE — PATIENT INSTRUCTIONS

## 2022-03-15 DIAGNOSIS — M47.816 LUMBAR SPONDYLOSIS: Primary | ICD-10-CM

## 2022-03-15 RX ORDER — BACLOFEN 10 MG/1
10 TABLET ORAL 2 TIMES DAILY
Qty: 60 TABLET | Refills: 3 | Status: SHIPPED | OUTPATIENT
Start: 2022-03-15 | End: 2022-05-14

## 2022-03-16 ENCOUNTER — HOSPITAL ENCOUNTER (OUTPATIENT)
Age: 77
Setting detail: SPECIMEN
Discharge: HOME | End: 2022-03-16

## 2022-03-16 DIAGNOSIS — R53.83 FATIGUE, UNSPECIFIED TYPE: ICD-10-CM

## 2022-03-16 DIAGNOSIS — E78.5 HYPERLIPIDEMIA, UNSPECIFIED HYPERLIPIDEMIA TYPE: ICD-10-CM

## 2022-03-16 DIAGNOSIS — I10 HYPERTENSION, UNSPECIFIED TYPE: ICD-10-CM

## 2022-03-16 DIAGNOSIS — R25.2 LEG CRAMPING: ICD-10-CM

## 2022-03-16 DIAGNOSIS — E11.9 TYPE 2 DIABETES MELLITUS WITHOUT COMPLICATION, WITHOUT LONG-TERM CURRENT USE OF INSULIN (HCC): ICD-10-CM

## 2022-03-16 DIAGNOSIS — R39.11 URINARY HESITANCY: ICD-10-CM

## 2022-03-16 DIAGNOSIS — Z12.5 PROSTATE CANCER SCREENING: ICD-10-CM

## 2022-03-16 LAB
ABSOLUTE EOS #: 0.18 K/UL (ref 0–0.44)
ABSOLUTE IMMATURE GRANULOCYTE: <0.03 K/UL (ref 0–0.3)
ABSOLUTE LYMPH #: 1.6 K/UL (ref 1.1–3.7)
ABSOLUTE MONO #: 0.76 K/UL (ref 0.1–1.2)
ALBUMIN SERPL-MCNC: 4.3 G/DL (ref 3.5–5.2)
ALBUMIN/GLOBULIN RATIO: 1.9 (ref 1–2.5)
ALP BLD-CCNC: 69 U/L (ref 40–129)
ALT SERPL-CCNC: 18 U/L (ref 5–41)
ANION GAP SERPL CALCULATED.3IONS-SCNC: 12 MMOL/L (ref 9–17)
AST SERPL-CCNC: 18 U/L
BASOPHILS # BLD: 1 % (ref 0–2)
BASOPHILS ABSOLUTE: 0.07 K/UL (ref 0–0.2)
BILIRUB SERPL-MCNC: 0.37 MG/DL (ref 0.3–1.2)
BUN BLDV-MCNC: 19 MG/DL (ref 8–23)
CALCIUM SERPL-MCNC: 9.8 MG/DL (ref 8.6–10.4)
CHLORIDE BLD-SCNC: 105 MMOL/L (ref 98–107)
CHOLESTEROL/HDL RATIO: 3
CHOLESTEROL: 155 MG/DL
CO2: 26 MMOL/L (ref 20–31)
CREAT SERPL-MCNC: 0.91 MG/DL (ref 0.7–1.2)
EOSINOPHILS RELATIVE PERCENT: 2 % (ref 1–4)
GFR AFRICAN AMERICAN: >60 ML/MIN
GFR NON-AFRICAN AMERICAN: >60 ML/MIN
GFR SERPL CREATININE-BSD FRML MDRD: ABNORMAL ML/MIN/{1.73_M2}
GLUCOSE BLD-MCNC: 129 MG/DL (ref 70–99)
HCT VFR BLD CALC: 44.3 % (ref 40.7–50.3)
HDLC SERPL-MCNC: 51 MG/DL
HEMOGLOBIN: 14.5 G/DL (ref 13–17)
IMMATURE GRANULOCYTES: 0 %
LDL CHOLESTEROL: 94 MG/DL (ref 0–130)
LYMPHOCYTES # BLD: 21 % (ref 24–43)
MCH RBC QN AUTO: 30.8 PG (ref 25.2–33.5)
MCHC RBC AUTO-ENTMCNC: 32.7 G/DL (ref 28.4–34.8)
MCV RBC AUTO: 94.1 FL (ref 82.6–102.9)
MONOCYTES # BLD: 10 % (ref 3–12)
NRBC AUTOMATED: 0 PER 100 WBC
PDW BLD-RTO: 13.2 % (ref 11.8–14.4)
PLATELET # BLD: 290 K/UL (ref 138–453)
PMV BLD AUTO: 10.8 FL (ref 8.1–13.5)
POTASSIUM SERPL-SCNC: 4.5 MMOL/L (ref 3.7–5.3)
PROSTATE SPECIFIC ANTIGEN: 2.55 UG/L
RBC # BLD: 4.71 M/UL (ref 4.21–5.77)
SEG NEUTROPHILS: 66 % (ref 36–65)
SEGMENTED NEUTROPHILS ABSOLUTE COUNT: 5.04 K/UL (ref 1.5–8.1)
SODIUM BLD-SCNC: 143 MMOL/L (ref 135–144)
T. PALLIDUM, IGG: NONREACTIVE
TOTAL CK: 227 U/L (ref 39–308)
TOTAL PROTEIN: 6.6 G/DL (ref 6.4–8.3)
TRIGL SERPL-MCNC: 50 MG/DL
TSH SERPL DL<=0.05 MIU/L-ACNC: 1.29 MIU/L (ref 0.3–5)
VITAMIN B-12: 483 PG/ML (ref 232–1245)
VITAMIN D 25-HYDROXY: 38.1 NG/ML
WBC # BLD: 7.7 K/UL (ref 3.5–11.3)

## 2022-03-23 ENCOUNTER — OFFICE VISIT (OUTPATIENT)
Dept: PODIATRY | Age: 77
End: 2022-03-23
Payer: MEDICARE

## 2022-03-23 ENCOUNTER — HOSPITAL ENCOUNTER (OUTPATIENT)
Dept: PHYSICAL THERAPY | Facility: CLINIC | Age: 77
Setting detail: THERAPIES SERIES
Discharge: HOME | End: 2022-03-23
Payer: MEDICARE

## 2022-03-23 VITALS — BODY MASS INDEX: 27.2 KG/M2 | HEIGHT: 70 IN | RESPIRATION RATE: 16 BRPM | WEIGHT: 190 LBS

## 2022-03-23 DIAGNOSIS — M79.605 PAIN IN BOTH LOWER EXTREMITIES: ICD-10-CM

## 2022-03-23 DIAGNOSIS — E11.51 TYPE II DIABETES MELLITUS WITH PERIPHERAL CIRCULATORY DISORDER (HCC): ICD-10-CM

## 2022-03-23 DIAGNOSIS — E08.42 DIABETIC POLYNEUROPATHY ASSOCIATED WITH DIABETES MELLITUS DUE TO UNDERLYING CONDITION (HCC): ICD-10-CM

## 2022-03-23 DIAGNOSIS — B35.1 DERMATOPHYTOSIS OF NAIL: Primary | ICD-10-CM

## 2022-03-23 DIAGNOSIS — M79.604 PAIN IN BOTH LOWER EXTREMITIES: ICD-10-CM

## 2022-03-23 PROCEDURE — 97110 THERAPEUTIC EXERCISES: CPT

## 2022-03-23 PROCEDURE — 97161 PT EVAL LOW COMPLEX 20 MIN: CPT

## 2022-03-23 PROCEDURE — 11721 DEBRIDE NAIL 6 OR MORE: CPT | Performed by: PODIATRIST

## 2022-03-23 PROCEDURE — 99999 PR OFFICE/OUTPT VISIT,PROCEDURE ONLY: CPT | Performed by: PODIATRIST

## 2022-03-23 NOTE — PROGRESS NOTES
[address] [facility] PODIATRY [facility]  [address] [address] [facility] [address]  Dept: [phone]  Dept Fax: [phone]    DIABETIC PROGRESS NOTE  Date of patient's visit: 3/23/2022  Patient's Name:  Martita Boykin YOB: 1945            Patient Care Team:  CRISTINA Norris CNP as PCP - General (Certified Nurse Practitioner)  CRISTINA Norris CNP as PCP - [facility] Empaneled Provider  Margy Segal DPM as Physician (Podiatry)          Chief Complaint   Patient presents with    Diabetes    Peripheral Neuropathy    Nail Problem       Subjective:   Martita Boykin comes to clinic for Diabetes, Peripheral Neuropathy, and Nail Problem    he is a diabetic and states that he is doing well. Pt currently has complaint of thickened, elongated nails that they cannot manage by themselves. Pt's primary care physician is CRISTINA Norris CNP last seen 03/14/2022   Pt's last blood sugar was 105 . Lab Results   Component Value Date    LABA1C 6.8 03/14/2022      Complains of numbness in the feet bilat. Past Medical History:   Diagnosis Date    Depression     Diabetes mellitus ([facility] [address]     Hypertension     Neuropathy        No Known Allergies  Current Outpatient Medications on File Prior to Visit   Medication Sig Dispense Refill    baclofen (LIORESAL) 10 MG tablet Take 1 tablet by mouth 2 times daily 60 tablet 3    finasteride (PROSCAR) 5 MG tablet Take 1 tablet by mouth daily 90 tablet 1    sitaGLIPtan-metFORMIN (JANUMET)  MG per tablet Janumet  MG Oral Tablet TAKE 1 TABLET TWICE DAILY WITH MEALS.   Refills: 0 Active 180 tablet 5    simvastatin (ZOCOR) 20 MG tablet TAKE 1 TABLET NIGHTLY 90 tablet 3    ondansetron (ZOFRAN) 4 MG tablet Take 1 tablet by mouth 3 times daily as needed for Nausea or Vomiting 15 tablet 0    omeprazole (PRILOSEC) 40 MG delayed release capsule Take 1 capsule by mouth every morning (before breakfast) 90 capsule 1    donepezil (ARICEPT) 10 MG tablet Take 1 tablet by mouth nightly 90 tablet 3    amLODIPine (NORVASC) 2.5 MG tablet Take 1 tablet by mouth daily 90 tablet 3    tamsulosin (FLOMAX) 0.4 MG capsule Take 1 capsule by mouth daily 90 capsule 11    meloxicam (MOBIC) 7.5 MG tablet Take 1 tablet by mouth daily 90 tablet 5    glimepiride (AMARYL) 2 MG tablet Take 2 tablets by mouth every morning 180 tablet 5    gabapentin (NEURONTIN) 300 MG capsule Take 1 capsule by mouth 2 times daily for 30 days. 180 capsule 5     No current facility-administered medications on file prior to visit. Review of Systems    Review of Systems:   History obtained from chart review and the patient  General ROS: negative for - chills, fatigue, fever, night sweats or weight gain  Constitutional: Negative for chills, diaphoresis, fatigue, fever and unexpected weight change. Musculoskeletal: Positive for arthralgias, gait problem and joint swelling. Neurological ROS: negative for - behavioral changes, confusion, headaches or seizures. Negative for weakness and numbness. Dermatological ROS: negative for - mole changes, rash  Cardiovascular: Negative for leg swelling. Gastrointestinal: Negative for constipation, diarrhea, nausea and vomiting. Objective:  Dermatologic Exam:  Skin lesion/ulceration Absent . Skin No rashes or nodules noted. .   Skin is thin, with flaky sloughing skin as well as decreased hair growth to the lower leg  Small red hemosiderin deposits seen dorsal foot   Musculoskeletal:     1st MPJ ROM decreased, Bilateral.  Muscle strength 5/5, Bilateral.  Pain present upon palpation of toenails 1-5, Bilateral. decreased medial longitudinal arch, Bilateral.  Ankle ROM decreased,Bilateral.    Dorsally contracted digits present digits 2, Bilateral.     Vascular: DP pulses 1/4 bilateral.  PT pulses 0/4 bilateral.   CFT <5 seconds, Bilateral.  Hair growth absent to the level of the digits, dorsalis pedis pulse and 0/4 Posterior tibial pulse,   Pinprick: Impaired  Proprioception: Impaired  Vibration (128 Hz): Impaired       DM with PVD       [x]Yes    []No      Assessment:  68 y.o. male with:   Diagnosis Orders   1. Dermatophytosis of nail   DIABETES FOOT EXAM    30892 - CT DEBRIDEMENT OF NAILS, 6 OR MORE   2. Type II diabetes mellitus with peripheral circulatory disorder (HCC)  HM DIABETES FOOT EXAM    00286 - CT DEBRIDEMENT OF NAILS, 6 OR MORE   3. Diabetic polyneuropathy associated with diabetes mellitus due to underlying condition (HCC)  HM DIABETES FOOT EXAM    01275 - CT DEBRIDEMENT OF NAILS, 6 OR MORE   4. Pain in both lower extremities  HM DIABETES FOOT EXAM    79352 - CT DEBRIDEMENT OF NAILS, 6 OR MORE           Q7   []Yes    []No                Q8   [x]Yes    []No                     Q9   []Yes    []No    Plan:   Pt was evaluated and examined. Patient was given personalized discharge instructions. Nails 1-10 were debrided sharply in length and thickness with a nipper and , without incident. Pt will follow up in 9 weeks or sooner if any problems arise. Diagnosis was discussed with the pt and all of their questions were answered in detail. Proper foot hygiene and care was discussed with the pt. Informed patient on proper diabetic foot care and importance of tight glycemic control. Patient to check feet daily and contact the office with any questions/problems/concerns.    Other comorbidity noted and will be managed by PCP.  3/23/2022    Electronically signed by Liz Lyons DPM on 3/23/2022 at 1:37 PM  3/23/2022

## 2022-03-23 NOTE — PLAN OF CARE
[] [facility]) - [facility] &  Therapy  [address]  [phone]  F: [phone] [x] [address]  [facility]   [address]  P: [phone]  F: [phone] [] [address] &  Therapy  [address]  P: [phone]  F: [phone] [] [address]  P: [phone]  F: [phone] [] [address]  [facility]   [address]   [place]: [phone]  F: [phone]        Physical Therapy Plan of Care    Date:  3/23/2022  Patient: Giovanni Velez  : [birth year]  MRN: [id number]  Physician: CRISTINA Mcgregor - CNP                        Insurance: [facility] (No Copay)/[facility]   Medical Diagnosis: M47.816 (ICD-10-CM) - Lumbar spondylosis  Rehab Codes: M54.59, M25.651, M25.652, M62.81, Z91.81  Onset Date: 2020                    Next 's appt.: 2022            Subjective:      Pt arrived to PT with c/o increased low back pain occurred sometimes in 2020. Pt reported pain is intermittent, increased with bending, twisting, lifting, decreased with resting in recliner. Pt reported increased activities will increase low back pain up to 8/10 and take awhile to reduce to 5/10 with use of lidocaine patch. Pt noted sometimes take Tylenol to reduce pain. Pt noted (-) radicular symptoms to BLE. Pt reported fell 3-4x in the past 6 months due to feeling unsteadiness and \"easily stumbling\" however not noticing B knee buckling. 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       Pt reported hx of R knee surgery years ago (does not remember date) due to injured ligament      Pain present? []? Yes  [x]?  No          Location  Low back (currently denied pain)    Pain Rating currently  (0-10 scale)   0/10   Pain at worse 8/10   Pain at best 5/10   Description of pain Intermittent, throbbing    Altered Sensation No numbness/tingling to BLE   What makes it worse Bending, twisting, lifting    What makes it better Lidocaine patch, stay in recliner, Tylenol (sometimes)   Symptom progression Getting worse   Sleep Disturbed (not d/t pain)               Assessment: 69 yo patient presents to physical therapy with c/o LBP without radicular symptoms to BLE. Pt reported pain inc with bending, twisting, and lifting. Pt demo decreased overall B hip strength and flexibility, reduced overall lumbar AROM with increased L-sided LBP with L rotation and c/o muscle tightness in lumbar spine with flexion, extension, R rotation, and B SB. Pt demo no change with symptoms when completing repeated flexion/extension in standing. Pt was able to complete 5xSTS w/o BUE support and no pain or SOB/fatigue were noted  However pt required forward trunk lean to ascend from chair d/t reduced BLE functional strength. Pt also demo decreased SLS balance which may contribute to fall risk. Patient would benefit from skilled physical therapy services in order to: manage pain, improve lumbar ROM, promote core stabilization with daily activities, improve BLE functional strength and flexibility, improve proprioception/SLS balance to reduce fall risks, and promote safe lifting/carrying technique to assist pt in returning to prior function.      Problems:    [x]? ? Pain:  [x]? ? ROM:  [x]? ? Strength:  [x]? ? Function:        STG: (to be met in 6 treatments)  1. Pt will reduce pain to at least 5/10 to ease difficulty with yard work activities  2. Pt will improve R Ely's result to at least 80° to demo improved R hip flexors/quad muscle length to participate in household activities  3. Pt will improve R SLR result to at least 58° to demo improved R HS muscle length to ease difficulty with bending-related activities  4.  Pt will improve lumbar ROM to less than 50% with all movements to ease difficulty with daily functional activities    5. Pt will demo safe lifting/carrying techniques to perform light daily duties with decreased stress to lumbar spine   6. Pt will improve overall B hip strength to at least 4/5 to ease difficulty with daily ambulation   7. Patient to be independent with home exercise program as demonstrated by performance with correct form without cues. 8. Demonstrate Knowledge of fall prevention    LTG: (to be met in 12 treatments)  1. Pt will improve Oswestry score from 28% impaired to less than 20% impaired to demo improved tolerance with standing activities   2. Pt will demo improved BLE functional strength as evident by completing 5xSTS in under 15sec without BUE support and without increased forward trunk lean compensation   3. Pt will be able to maintain SLS balance EO within at least 5 sec to demo improved proprioception to reduce fall risk             Patient goals: \"to have less pain and to be able to do more stuff\"           Treatment Plan:  [x]? Therapeutic Exercise   73775             []? Iontophoresis: 4 mg/mL Dexamethasone Sodium Phosphate  mAmin  45182   []? Therapeutic Activity  49017 []? Vasopneumatic cold with compression  [id number]               []? Gait Training    97442 []? Ultrasound        [id number]   [x]? Neuromuscular Re-education  [id number] []? Electrical Stimulation Unattended  22 [id number]   [x]? Manual Therapy  88271 []? Electrical Stimulation Attended  [id number]   [x]? Instruction in HEP       [x]? Lumbar/Cervical Traction  [id number]   []? Aquatic Therapy           [id number] [x]? Cold/hotpack     []? Massage   19518      []? Dry Needling, 1 or 2 muscles  14353   []? Biofeedback, first 15 minutes   79106  []? Biofeedback, additional 15 minutes   68169 []? Dry Needling, 3 or more muscles  33572         Frequency:  2 x/week for 12 visits      Electronically signed by:  Shakira Hutchinson, PT        Physician Signature:________________________________Date:__________________  By signing above or cosigning this note, I have reviewed this plan of care and certify a need for medically necessary rehabilitation services.      *PLEASE SIGN ABOVE AND FAX BACK ALL PAGES*

## 2022-03-23 NOTE — CONSULTS
[] [facility]) - [facility] &  Therapy  [address]  [phone]  F: [phone] [x] [address]  [facility]   [address]  P: [phone]  F: [phone] [] [address] &  Therapy  [address]  P: [phone]  F: [phone] [] [address]  P: [phone]  F: [phone] [] [address]  [facility]   [address]   [place]: [phone]  F: [phone]        Physical Therapy Spine Evaluation    Date:  3/23/2022  Patient: Nonah Course  : [birth year]  MRN: [id number]  Physician: CRISTINA Norris - CNP  Insurance: [employer] (No Copay)/[facility]   Medical Diagnosis: M47.816 (ICD-10-CM) - Lumbar spondylosis  Rehab Codes: M54.59, M25.651, M25.652, M62.81, Z91.81  Onset Date: 2020  Next 's appt.: 2022    Subjective:     Pt arrived to PT with c/o increased low back pain occurred sometimes in 2020. Pt reported pain is intermittent, increased with bending, twisting, lifting, decreased with resting in recliner. Pt reported increased activities will increase low back pain up to 8/10 and take awhile to reduce to 5/10 with use of lidocaine patch. Pt noted sometimes take Tylenol to reduce pain. Pt noted (-) radicular symptoms to BLE. Pt reported fell 3-4x in the past 6 months due to feeling unsteadiness and \"easily stumbling\" however not noticing B knee buckling. 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 Pt reported hx of R knee surgery years ago (does not remember date) due to injured ligament     Pain present?  [] Yes  [x] No    Location  Low back (currently denied pain)    Pain Rating currently  (0-10 scale)   0/10   Pain at worse 8/10   Pain at best 5/10   Description of pain Intermittent, throbbing    Altered Sensation No numbness/tingling to BLE   What makes it worse Bending, twisting, lifting    What makes it better Lidocaine patch, stay in recliner, Tylenol (sometimes)   Symptom progression Getting worse   Sleep Disturbed (not d/t pain)         Comorbidities:   [] Obesity [] Dialysis  [] N/A   [] Asthma/COPD [] Dementia [] Other:   [] Stroke [] Sleep apnea [] Other:   [] Vascular disease [] Rheumatic disease [] Other: PMHx: [x] Refer to full medical chart  In EPIC       [] Unremarkable [x] 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             Medications: [x] Refer to full medical record                          [] None                          [] Other:    Allergies:      [x] Refer to full medical record                         [] None                         [] Other:    Function:  Hand Dominance  [x] Right  [] Left  Patient live with: Wife and grandson   Home type [x]  One story      Stairs from outside          Hand rail [x]  No - Ramp  [x]  Hand rail - Bilat           Stairs inside          Hand rail  [] Yes - # steps            []  No   [x]  Hand rail - R/L         []  No Hand rail    Bathroom  [x] Walk in shower with shower chair            Washing machine on []  Main level   [] Second level   [] Basement   Employment Retired   Job status []  Normal duty   [] Light duty   [] Off due to condition    [x]  Retired   [] Not employed   [] Disability  [] Other:  []  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  Wife-recent back surgery  Grandson-dialysis appt 3x/week    Recreational   Activities Yard work  Garage work        ADL/IADL Previous level of function Current level of function Who currently assists the patient with task   Bathing  [x] Independent  [] Assist [x] Independent  [] Assist    Dress/grooming [x] Independent  [] Assist [x] Independent  [] Assist    Transfer/mobility [x] Independent  [] Assist [x] Independent  [] Assist    Feeding [x] Independent  [] Assist [x] Independent  [] Assist    Toileting [x] Independent  [] Assist [x] Independent  [] Assist    Driving [x] Independent  [] Assist [x] Independent  [] Assist    Housekeeping [x] Independent  [] Assist [x] Independent  [] Assist    Grocery shop/meal prep [x] Independent  [] Assist [x] Independent  [] Assist        Gait Prior level of function Current level of function    [x] Independent  [] Assist [x] Independent  [] Assist   Device: [x] Independent [x] Independent     Comment:         Objective:    OBSERVATION No Deficit Deficit Not Tested Comments   Posture       Forward Head [] [x] []    Rounded Shoulders [] [x] []    Kyphosis [] [x] []    Lordosis [] [x] []    Leg Length Discrp [x] [] []    Slumped Sitting [] [x] []    Palpation [x] [] []    Sensation [x] [] []    Edema [x] [] []    Neurological [x] [] []         STRENGTH    Left Right   L1-2 Hip Flex 4-/5 4-/5   Hip Abd 4-/5 4-/5   Hip ER 4-/5 4-/5   L3-4 Knee Ext 4/5 4/5   Knee Flex 4-/5 4-/5   L4 Ankle DF 5/5 5/5   S1 Plant. Flex 5/5 5/5   Abdominals poor poor   Erector Spinae poor poor               Lumbar    Flexion 25% limited   Extension 50% limited   Rotation L 50% limited   (+) L-sided pain in low back R  50% limited   Sidebend  L 75%  limited   (+) R lumbar muscle tightness R  75% limited      . TESTS (+/-) LEFT RIGHT Not Tested   SLR [x] sit [x] supine + + []   Hamstring (SLR) + (58) + (40) []   Ely's test + (80) + (65)    Slump/Dural - - []   Damion Tests ? Pain ?  Pain No Change Not Tested   RFIS [] [] [x] []   BAYLEE [] [] [x] []   RFIL [] [] [] [x]   REIL [] [] [] [x]     Comment: muscle tightness noted with RFIS and BAYLEE (-) pain (-) radicular symptoms       BALANCE Left Right   Tandem Stance (Eyes Open) 8sec  Mod sway 10sec  Mod sway   Tandem Stance (Eyes Closed) 6sec LOB 4sec LOB   Single Leg (Eyes Open) 2sec LOB 3sec LOB         Functional Test: Oswestry Score: 14/50 = 28% functionally impaired     Comments:    5xSTS: 19sec, no BUE support, (-) pain, (-) SOB/fatigue          Assessment: 69 yo patient presents to physical therapy with c/o LBP without radicular symptoms to [facility]. Pt reported pain inc with bending, twisting, and lifting. Pt demo decreased overall B hip strength and flexibility, reduced overall lumbar AROM with increased L-sided LBP with L rotation and c/o muscle tightness in lumbar spine with flexion, extension, R rotation, and B SB. Pt demo no change with symptoms when completing repeated flexion/extension in standing. Pt was able to complete 5xSTS w/o BUE support and no pain or SOB/fatigue were noted  However pt required forward trunk lean to ascend from chair d/t reduced BLE functional strength. Pt also demo decreased SLS balance which may contribute to fall risk. Patient would benefit from skilled physical therapy services in order to: manage pain, improve lumbar ROM, promote core stabilization with daily activities, improve BLE functional strength and flexibility, improve proprioception/SLS balance to reduce fall risks, and promote safe lifting/carrying technique to assist pt in returning to prior function.      Problems:    [x]? ? Pain:  [x]? ? ROM:  [x]? ? Strength:  [x]? ? Function:        STG: (to be met in 6 treatments)  1. Pt will reduce pain to at least 5/10 to ease difficulty with yard work activities  2. Pt will improve R Ely's result to at least 80° to demo improved R hip flexors/quad muscle length to participate in household activities  3. Pt will improve R SLR result to at least 58° to demo improved R HS muscle length to ease difficulty with bending-related activities  4.  Pt will improve lumbar ROM to less than 50% with all movements to ease difficulty with daily functional activities    5. Pt will demo safe lifting/carrying techniques to perform light daily duties with decreased stress to lumbar spine   6. Pt will improve overall B hip strength to at least 4/5 to ease difficulty with daily ambulation   7. Patient to be independent with home exercise program as demonstrated by performance with correct form without cues. 8. Demonstrate Knowledge of fall prevention    LTG: (to be met in 12 treatments)  1. Pt will improve Oswestry score from 28% impaired to less than 20% impaired to demo improved tolerance with standing activities   2. Pt will demo improved BLE functional strength as evident by completing 5xSTS in under 15sec without BUE support and without increased forward trunk lean compensation   3. Pt will be able to maintain SLS balance EO within at least 5 sec to demo improved proprioception to reduce fall risk           Rehab Potential:  [x] Good  [] Fair  [] Poor   Suggested Professional Referral:  [x] No  [] Yes:  Barriers to Goal Achievement:  [x] No  [] Yes:  Domestic Concerns:  [x] No  [] Yes:    Pt. Education:  [x] Plans/Goals, Risks/Benefits discussed  [x] Home exercise program  Method of Education: [x] Verbal  [x] Demo  [x] Written  Comprehension of Education:  [x] Verbalizes understanding. [x] Demonstrates understanding. [] Needs Review. [] Demonstrates/verbalizes understanding of HEP/Ed previously given. Access Code: [email or web address]  URL: [email or web address]  Date: 03/23/2022  Prepared by:  Shakira Gonzales    Exercises  Supine Posterior Pelvic Tilt - 1 x daily - 7 x weekly - 1 sets - 10 reps - 5s hold  Clamshell - 1 x daily - 7 x weekly - 2 sets - 10 reps  Prone Quadriceps Stretch with Strap - 1 x daily - 7 x weekly - 1 sets - 3 reps - 20s hold  Seated Hamstring Stretch - 1 x daily - 7 x weekly - 1 sets - 3 reps - 20s hold  Hooklying Hamstring Stretch with Strap - 1 x daily - 7 x weekly - 2 sets - 3 reps - 20s hold      Treatment Plan:  [x] Therapeutic Exercise   06212  [] Iontophoresis: 4 mg/mL Dexamethasone Sodium Phosphate  mAmin  82211   [] Therapeutic Activity  65499 [] Vasopneumatic cold with compression  91125    [] Gait Training   77671 [] Ultrasound   01887   [x] Neuromuscular Re-education  78839 [] Electrical Stimulation Unattended  24452   [x] Manual Therapy  57833 [] Electrical Stimulation Attended  83762   [x] Instruction in HEP  [x] Lumbar/Cervical Traction  93271   [] Aquatic Therapy   75935 [x] Cold/hotpack    [] Massage   82129      [] Dry Needling, 1 or 2 muscles  98761   [] Biofeedback, first 15 minutes   89839  [] Biofeedback, additional 15 minutes   60492 [] Dry Needling, 3 or more muscles  02453       Frequency:  2 x/week for 12 visits    Todays Treatment:  Modalities:   Precautions:  Exercises:  Exercise Reps/ Time Weight/ Level Comments         Supine      PPT  10x5s  Cues to stop holding breath   B HS stretch 3x10s ea strap                      Sidelying      Clamshell 10x ea  Cues to engage core         Prone      B hip flexors stretch 3x10s ea strap    Other:    Specific Instructions for next treatment:  - Progress core strength  - Improve BLE strength (Work on STS w/ inc WB on BLE & reduced forward trunk lean compensation when ascending from chair)  - Improve flexibility of B hip flexors/quad, B HS, B gastroc/soleus  - Improve quad eccentric control    - Improve lumbar AROM in all ranges  - Postural education   - Provide and discuss fall prevention intervention at next visit       Evaluation Complexity:  History (Personal factors, comorbidities) [] 0 [x] 1-2 [] 3+   Exam (limitations, restrictions) [] 1-2 [x] 3 [] 4+   Clinical presentation (progression) [x] Stable [] Evolving  [] Unstable   Decision Making [x] Low [] Moderate [] High    [x] Low Complexity [] Moderate Complexity [] High Complexity       Treatment Charges: Mins Units   [x] Evaluation       [x]  Low       [] Moderate       []  High 35 1   []  Modalities     [x]  Ther Exercise 10 1   []  Manual Therapy     []  Ther Activities     []  Aquatics     []  Vasocompression     []  Other       Total Est Medicare Cost as of 3/23/2022: $120.85    TOTAL TREATMENT TIME: 45 min          Time in: 0900 am      Time out: 1000 am    Electronically signed by: Shakira Mccallum, PT        Physician Signature:________________________________Date:__________________  By signing above or cosigning this note, I have reviewed this plan of care and certify a need for medically necessary rehabilitation services.      *PLEASE SIGN ABOVE AND FAX BACK ALL PAGES*

## 2022-03-23 NOTE — FLOWSHEET NOTE
[facility] Fall Risk Assessment    Patient Name:  Ramírez Oliveira  : 1945      Risk Factor Scale  Score   History of Falls [x] Yes  [] No 25  0 25   Secondary Diagnosis [] Yes  [x] No 15  0 0   Ambulatory Aid [] Furniture  [] Crutches/cane/walker  [x] None/bedrest/wheelchair/nurse 30  15  0 0   IV/Heparin Lock [] Yes  [x] No 20  0 0   Gait/Transferring [] Impaired  [x] Weak  [] Normal/bedrest/immobile 20  10  0 10   Mental Status [] Forgets limitations  [x] Oriented to own ability 15  0 0      Total: 35     Based on the Assessment score: check the appropriate box. []  No intervention needed   Low =   Score of 0-24    [x]  Use standard prevention interventions Moderate =  Score of 24-44   [x] Give patient handout and discuss fall prevention strategies   [x] Establish goal of education for patient/family RE: fall prevention strategies    []  Use high risk prevention interventions High = Score of 45 and higher   [] Give patient handout and discuss fall prevention strategies   [] Establish goal of education for patient/family Re: fall prevention strategies   [] Discuss lifeline / other resources    Electronically signed by:    Christine Moise  Date: 3/23/2022

## 2022-03-28 ENCOUNTER — HOSPITAL ENCOUNTER (OUTPATIENT)
Dept: PHYSICAL THERAPY | Facility: CLINIC | Age: 77
Setting detail: THERAPIES SERIES
Discharge: HOME | End: 2022-03-28
Payer: MEDICARE

## 2022-03-28 PROCEDURE — 97110 THERAPEUTIC EXERCISES: CPT

## 2022-03-28 NOTE — FLOWSHEET NOTE
[] [facility]) [facility] &  Therapy  [address]  [phone]  F: [phone] [x] [address]  [facility]   [address]  P: [phone]  F: [phone] [] [address] &  Therapy  [address]  P: [phone]  F: [phone] [] [address]  P: [phone]  F: [phone] [] [address]  [facility]   [address]   [place]: [phone]  F: [phone]      Physical Therapy Daily Treatment Note    Date:  3/28/2022  Patient Name:  Barak Tolentino    :    MRN: [id number]  Physician: CRISTINA Rowell CNP                         Insurance: MEDICARE (No Copay)/[facility]   Medical Diagnosis: M47.816 (ICD-10-CM) - Lumbar spondylosis  Rehab Codes: M54.59, M25.651, M25.652, M62.81, Z91.81  Onset Date: 2020                    Next 's appt.: 2022  Visit# / total visits: ;  Progress note for Medicare patient due at visit 10     Cancels/No Shows: 0/0    Subjective:    Pain:  [] Yes  [x] No Location: N/A Pain Rating: (0-10 scale) 0/10  Pain altered Tx:  [x] No  [] Yes  Action:    Comments: Pt arrived to physical therapy without c/o increased pain. Pt reported has been doing HEP and having difficulty with supine HS stretch.       Objective:  Modalities:   Precautions:  Exercises: BOLD = COMPLETED TODAY   Exercise Reps/ Time Weight/ Level Comments             Supine         PPT  10x5s   Cues to stop holding breath   B HS stretch 2x30s ea strap 3/28-inc hold duration   TrA w/ marching  2x10   Added 3/28   TrA w/ knee fallout  2x10  Added 3/28   B SLR w TrA 10x ea   Cues to stop holding breath              Sidelying         Clamshell 10x ea Lime  Cues to engage core   B hip abd w/ TrA 10x ea   Added 3/28   Open book 10x ea  Added 3/28             Prone         B hip flexors stretch 2x30s ea strap 3/28-inc hold duration          Sit to Stance 10x ea Chair w/ armrest Added 3/28-arm cross chest      TG squat 10x  lvl 17 Added 3/28                                             Other: cues pt to count out loud with TrA activation to avoid holding breath     Specific Instructions for next treatment:  - Progress core strength  - Improve BLE strength (Work on STS w/ inc WB on BLE & reduced forward trunk lean compensation when ascending from chair)  - Improve flexibility of B hip flexors/quad, B HS, B gastroc/soleus  - Improve quad eccentric control    - Improve lumbar AROM in all ranges  - Postural education   - Provide and discuss fall prevention intervention at next visit         Treatment Charges: Mins Units   []  Modalities     [x]  Ther Exercise 40 3   []  Manual Therapy     []  Ther Activities     []  Aquatics     []  Vasocompression     []  Other     Total Treatment time 40 3     Total Est Medicare Cost as of 3/23/2022: $195.74      Assessment: [x] Progressing toward goals. Pt was able to tolerate therex program this date without c/o increased low back pain. Short rest break provided in between reps/sets to reduce muscle soreness/fatigue. Pt required frequent verbal and tactile cues for proper technique throughout all exs this date and to avoid compensation from hip flexors and glut with hip abduction. Pt required cues to improve TA and WB with sit to stand to progress functional strength of BLE. Pt demo BLE weakness with sit to stand as pt required excessive forward trunk lean to assist with ascending from chair and cues to reduce speed with descending down to chair to promote eccentric control of BLE.        [] No change.      [] Other:  [] Patient would continue to benefit from skilled physical therapy services in order to: manage pain, improve lumbar ROM, promote core stabilization with daily activities, improve BLE functional strength and flexibility, improve proprioception/SLS balance to reduce fall risks, and promote safe lifting/carrying technique to assist pt in returning to prior function.             STG: (to be met in 6 treatments)  1. Pt will reduce pain to at least 5/10 to ease difficulty with yard work activities  2. Pt will improve R Ely's result to at least 80° to demo improved R hip flexors/quad muscle length to participate in household activities  3. Pt will improve R SLR result to at least 58° to demo improved R HS muscle length to ease difficulty with bending-related activities  4. Pt will improve lumbar ROM to less than 50% with all movements to ease difficulty with daily functional activities    5. Pt will demo safe lifting/carrying techniques to perform light daily duties with decreased stress to lumbar spine   6. Pt will improve overall B hip strength to at least 4/5 to ease difficulty with daily ambulation   7. Patient to be independent with home exercise program as demonstrated by performance with correct form without cues. 8. Demonstrate Knowledge of fall prevention     LTG: (to be met in 12 treatments)  1. Pt will improve Oswestry score from 28% impaired to less than 20% impaired to demo improved tolerance with standing activities   2. Pt will demo improved BLE functional strength as evident by completing 5xSTS in under 15sec without BUE support and without increased forward trunk lean compensation   3. Pt will be able to maintain SLS balance EO within at least 5 sec to demo improved proprioception to reduce fall risk       Pt. Education:  [x] Yes  [] No  [] Reviewed Prior HEP/Ed  Method of Education: [] Verbal  [] Demo  [] Written  Comprehension of Education:  [] Verbalizes understanding. [] Demonstrates understanding. [] Needs review. [x] Demonstrates/verbalizes HEP/Ed previously given. Access Code: [email or web address]  URL: [email or web address]  Date: 03/28/2022  Prepared by:  Shakira Liz Chou    Exercises  Supine Posterior Pelvic Tilt - 1 x daily - 7 x weekly - 1 sets - 10 reps - 5s hold  Clamshell - 1 x daily - 7 x weekly - 2 sets - 10 reps  Prone Quadriceps Stretch with Strap - 1 x daily - 7 x weekly - 1 sets - 3 reps - 20s hold  Seated Hamstring Stretch - 1 x daily - 7 x weekly - 1 sets - 3 reps - 20s hold  Hooklying Hamstring Stretch with Strap - 1 x daily - 7 x weekly - 2 sets - 3 reps - 20s hold  Clamshell with Resistance - 1 x daily - 7 x weekly - 2 sets - 10 reps  Bent Knee Fallouts with Alternating Legs - 1 x daily - 7 x weekly - 1 sets - 10 reps          Plan: [x] Continue current frequency toward long and short term goals. [x] Specific Instructions for subsequent treatments: stated above      Time In: 4:05 pm            Time Out: 4:53 pm    Electronically signed by:   Shakira Chou, PT

## 2022-03-30 ENCOUNTER — HOSPITAL ENCOUNTER (OUTPATIENT)
Dept: PHYSICAL THERAPY | Facility: CLINIC | Age: 77
Setting detail: THERAPIES SERIES
Discharge: HOME | End: 2022-03-30
Payer: MEDICARE

## 2022-03-30 PROCEDURE — 97110 THERAPEUTIC EXERCISES: CPT

## 2022-03-30 NOTE — FLOWSHEET NOTE
[] [facility]) [facility] &  Therapy  [address]  [phone]  F: [phone] [x] [address]  [facility]   [address]  P: [phone]  F: [phone] [] [address] &  Therapy  [address]  P: [phone]  F: [phone] [] [address]  P: [phone]  F: [phone] [] [address]  [facility]   [address]   [place]: [phone]  F: [phone]      Physical Therapy Daily Treatment Note    Date:  3/30/2022  Patient Name:  Reyes Starch    :  [birth year]  MRN: [id number]  Physician: CRISTINA Lovelace CNP                         Insurance: MEDICARE (No Copay)/[facility]   Medical Diagnosis: M47.816 (ICD-10-CM) - Lumbar spondylosis  Rehab Codes: M54.59, M25.651, M25.652, M62.81, Z91.81  Onset Date: 2020                    Next 's appt.: 2022  Visit# / total visits: 3/12;  Progress note for Medicare patient due at visit 10     Cancels/No Shows: 0/0    Subjective:    Pain:  [x] Yes  [] No Location: N/A   Pain Rating: (0-10 scale) 8/10  Pain altered Tx:  [] No  [x] Yes  Action: read assessment section below     Comments: Pt arrived to physical therapy with c/o increased LBP without radicular symptoms rated at 8/10. Pt reported feeling finea after last therapy visit however increased LBP may be caused by overdoing HEP and UBE exs for additional 45 mins yesterday. Pt was advise to take a couple days off of HEP in attempt to reduce symptoms and ease back into HEP as pt feels better after resting.      Objective:  Modalities:   Precautions:  Exercises: BOLD = COMPLETED TODAY   Exercise Reps/ Time Weight/ Level Comments             Supine         PPT  10x5s   Cues to stop holding breath   B HS stretch 2x30s ea strap 3/28-inc hold duration   TrA w/ marching  2x10   Added 3/28   TrA w/ knee fallout  2x10  3/30-reduce from 2 to 1 set due to inc LBP    B SLR w TrA 10x ea   3/30-perform in small ROM to avoid inc LBP    TrA w/ heel walkout  10x ea  Added 3/30             Sidelying         Clamshell 10x ea Lime  Cues to engage core   B hip abd w/ TrA 10x ea  Lime 3/30-added resistance   Open book  10x ea  Added 3/28             Prone         B hip flexors stretch 2x30s ea strap 3/28-inc hold duration          Sit to Stance 10x ea Chair w/ armrest Added 3/28-arm cross chest      TG squat 10x  lvl 17 Added 3/28         Seated      LAQ 10x5s 2# DB Added 3/30   Trunk rot w/ arm cross 10x  Added 3/30         Standing       Partial squat at elevated mat  2x5 55cm Added 3/30                     Other: cues pt to count out loud with TrA activation to avoid holding breath     Specific Instructions for next treatment:  - Progress core strength  - Improve BLE strength (Work on STS w/ inc WB on BLE & reduced forward trunk lean compensation when ascending from chair)  - Improve flexibility of B hip flexors/quad, B HS, B gastroc/soleus  - Improve quad eccentric control    - Improve lumbar AROM in all ranges  - Postural education   - Provide and discuss fall prevention intervention at next visit         Treatment Charges: Mins Units   []  Modalities     [x]  Ther Exercise 40 3   []  Manual Therapy     []  Ther Activities     []  Aquatics     []  Vasocompression     []  Other     Total Treatment time 40 3     Total Est Medicare Cost as of 3/30/2022: $270.63      Assessment: [] Progressing toward goals. [] No change. [x] Other: Completed various therex program this date in moderation with short rest break provided in between in attempt to avoid provoking LBP due to pt reported increased LBP without radicular symptoms to 8/10 from overworking on HEP and UBE at home.   Pt required intermittent cues throughout to avoid trunk lean compensation with exs in various positions and for proper technique as well as maintaining core stability and avoid holding breath. Added LAQ with hold duration, partial squat, and seated trunk rotation with arm crossed chest to cont progressing BLE strength and lumbar rotation ROM while monitoring pain. Pt demo restricted L lumbar rotation due to R lumbar muscle tightness and cues to maintain core stability throughout. Updated HEP with added LAQ in attempt to improve B quad eccentric strength/control. Pt reported LBP decreased to 6/10 after completing various therex program this date. [] Patient would continue to benefit from skilled physical therapy services in order to: manage pain, improve lumbar ROM, promote core stabilization with daily activities, improve BLE functional strength and flexibility, improve proprioception/SLS balance to reduce fall risks, and promote safe lifting/carrying technique to assist pt in returning to prior function.             STG: (to be met in 6 treatments)  1. Pt will reduce pain to at least 5/10 to ease difficulty with yard work activities  2. Pt will improve R Ely's result to at least 80° to demo improved R hip flexors/quad muscle length to participate in household activities  3. Pt will improve R SLR result to at least 58° to demo improved R HS muscle length to ease difficulty with bending-related activities  4. Pt will improve lumbar ROM to less than 50% with all movements to ease difficulty with daily functional activities    5. Pt will demo safe lifting/carrying techniques to perform light daily duties with decreased stress to lumbar spine   6. Pt will improve overall B hip strength to at least 4/5 to ease difficulty with daily ambulation   7. Patient to be independent with home exercise program as demonstrated by performance with correct form without cues. 8. Demonstrate Knowledge of fall prevention     LTG: (to be met in 12 treatments)  1.  Pt will improve Oswestry score from 28% impaired to less than 20% impaired to demo improved tolerance with standing activities   2. Pt will demo improved BLE functional strength as evident by completing 5xSTS in under 15sec without BUE support and without increased forward trunk lean compensation   3. Pt will be able to maintain SLS balance EO within at least 5 sec to demo improved proprioception to reduce fall risk       Pt. Education:  [x] Yes  [] No  [] Reviewed Prior HEP/Ed  Method of Education: [] Verbal  [] Demo  [] Written  Comprehension of Education:  [] Verbalizes understanding. [] Demonstrates understanding. [] Needs review. [x] Demonstrates/verbalizes HEP/Ed previously given. Access Code: [email or web address]  URL: [email or web address]  Date: 03/28/2022  Prepared by: Shakira Jones    Exercises  Supine Posterior Pelvic Tilt - 1 x daily - 7 x weekly - 1 sets - 10 reps - 5s hold  Clamshell - 1 x daily - 7 x weekly - 2 sets - 10 reps  Prone Quadriceps Stretch with Strap - 1 x daily - 7 x weekly - 1 sets - 3 reps - 20s hold  Seated Hamstring Stretch - 1 x daily - 7 x weekly - 1 sets - 3 reps - 20s hold  Hooklying Hamstring Stretch with Strap - 1 x daily - 7 x weekly - 2 sets - 3 reps - 20s hold  Clamshell with Resistance - 1 x daily - 7 x weekly - 2 sets - 10 reps  Bent Knee Fallouts with Alternating Legs - 1 x daily - 7 x weekly - 1 sets - 10 reps          Plan: [x] Continue current frequency toward long and short term goals. [x] Specific Instructions for subsequent treatments: stated above      Time In: 11:02 am  Time Out: 11:45 am    Electronically signed by:   Shakira Jones, PT

## 2022-04-04 ENCOUNTER — TELEPHONE (OUTPATIENT)
Dept: FAMILY MEDICINE CLINIC | Age: 77
End: 2022-04-04

## 2022-04-04 ENCOUNTER — HOSPITAL ENCOUNTER (OUTPATIENT)
Dept: PHYSICAL THERAPY | Facility: CLINIC | Age: 77
Setting detail: THERAPIES SERIES
Discharge: HOME | End: 2022-04-04
Payer: MEDICARE

## 2022-04-04 PROCEDURE — 97110 THERAPEUTIC EXERCISES: CPT

## 2022-04-04 NOTE — TELEPHONE ENCOUNTER
[facility] called and said they need an RX wrote for DM supplies. Said letter that you wrote in January will not work.     Needs to include testing 3 times per day  DX  NPI    Also needs last OV notes    Fax number [phone]

## 2022-04-04 NOTE — FLOWSHEET NOTE
[] [facility]) - [facility] &  Therapy  [address]  [phone]  F: [phone] [x] [address]  [facility]   [address]  P: [phone]  F: [phone] [] [address] &  Therapy  [address]  P: [phone]  F: [phone] [] [address]  P: [phone]  F: [phone] [] [address]  [facility]   [address]   [place]: [phone]  F: [phone]      Physical Therapy Daily Treatment Note    Date:  2022  Patient Name:  Rashel Briceño    :  1945  MRN: [id number]  Physician: CRISTINA Hanson CNP                         Insurance: MEDICARE (No Copay)/[facility]   Medical Diagnosis: M47.816 (ICD-10-CM) - Lumbar spondylosis  Rehab Codes: M54.59, M25.651, M25.652, M62.81, Z91.81  Onset Date: 2020                    Next 's appt.: 2022  Visit# / total visits: ;  Progress note for Medicare patient due at visit 10     Cancels/No Shows: 0/0    Subjective:     Pain:  [x] Yes  [] No Location: low back   Pain Rating: (0-10 scale) 6/10  Pain altered Tx:  [] No  [x] Yes  Action:     Comments: Pt reports this weekend he was trying to move a door from his truck. He moved it onto a samuel and started to fall off. He was trying to catch it so it didn't break and he fell back on his back with the door. He states his back is sore and he has felt very unsteady in his LE's the last couple of days.      Objective:  Modalities:   Precautions:  Exercises: BOLD = COMPLETED TODAY   Exercise Reps/ Time Weight/ Level Comments             Supine         PPT  10x5s   Cues to stop holding breath   B HS stretch 2x30s ea strap 3/28-inc hold duration   TrA w/ marching  2x10   Added 3/28   TrA w/ knee fallout  2x10  3/30-reduce from 2 to 1 set due to inc LBP    B SLR w TrA 10x ea   3/30-perform in small ROM to avoid inc LBP    TrA w/ heel walkout  10x ea  Added 3/30             Sidelying         Clamshell 10x ea Lime  Cues to engage core   B hip abd w/ TrA 10x ea  Lime 3/30-added resistance   Open book  10x ea  Added 3/28             Prone         B hip flexors stretch 2x30s ea strap 3/28-inc hold duration          Sit to Stance 10x ea Chair w/ armrest Added 3/28-arm cross chest      TG squat 10x  lvl 17 Added 3/28         Seated      LAQ 10x5s 2# DB Added 3/30   Trunk rot w/ arm cross 10x  Added 3/30         Standing       Partial squat at elevated mat  2x5 55cm Added 3/30                     Other:      Specific Instructions for next treatment:  - Progress core strength  - Improve BLE strength (Work on STS w/ inc WB on BLE & reduced forward trunk lean compensation when ascending from chair)  - Improve flexibility of B hip flexors/quad, B HS, B gastroc/soleus  - Improve quad eccentric control    - Improve lumbar AROM in all ranges  - Postural education       Treatment Charges: Mins Units   []  Modalities     [x]  Ther Exercise 45 3   []  Manual Therapy     []  Ther Activities     []  Aquatics     []  Vasocompression     []  Other     Total Treatment time 45 3     Total Est Medicare Cost as of 4/4/2022: $345.52       Assessment: [x] Progressing toward goals. Pt did well with all exercises performed today. He moved slowly through exercises and needed some cueing for form initially, but was able to perform correctly after initial cues. Pt reported having some soreness in low back throughout exercises, but remained around the same level throughout treatment. Issued and educated patient on fall prevention handout. [] No change.      [] Other:   [x] Patient would continue to benefit from skilled physical therapy services in order to: manage pain, improve lumbar ROM, promote core stabilization with daily activities, improve BLE functional strength and flexibility, improve proprioception/SLS balance to reduce fall risks, and promote safe lifting/carrying technique to assist pt in returning to prior function.             STG: (to be met in 6 treatments)  1. Pt will reduce pain to at least 5/10 to ease difficulty with yard work activities  2. Pt will improve R Ely's result to at least 80° to demo improved R hip flexors/quad muscle length to participate in household activities  3. Pt will improve R SLR result to at least 58° to demo improved R HS muscle length to ease difficulty with bending-related activities  4. Pt will improve lumbar ROM to less than 50% with all movements to ease difficulty with daily functional activities    5. Pt will demo safe lifting/carrying techniques to perform light daily duties with decreased stress to lumbar spine   6. Pt will improve overall B hip strength to at least 4/5 to ease difficulty with daily ambulation   7. Patient to be independent with home exercise program as demonstrated by performance with correct form without cues. 8. Demonstrate Knowledge of fall prevention     LTG: (to be met in 12 treatments)  1. Pt will improve Oswestry score from 28% impaired to less than 20% impaired to demo improved tolerance with standing activities   2. Pt will demo improved BLE functional strength as evident by completing 5xSTS in under 15sec without BUE support and without increased forward trunk lean compensation   3. Pt will be able to maintain SLS balance EO within at least 5 sec to demo improved proprioception to reduce fall risk       Pt. Education:  [x] Yes  [] No  [] Reviewed Prior HEP/Ed  Method of Education: [] Verbal  [] Demo  [x] Written: fall prevention handout   Comprehension of Education:  [] Verbalizes understanding. [] Demonstrates understanding. [] Needs review. [x] Demonstrates/verbalizes HEP/Ed previously given. Access Code: [email or web address]  URL: [email or web address]  Date: 03/28/2022  Prepared by:  Shakira Ardyth Bindu    Exercises  Supine Posterior Pelvic Tilt - 1 x daily - 7 x weekly - 1 sets - 10 reps - 5s hold  Clamshell - 1 x daily - 7 x weekly - 2 sets - 10 reps  Prone Quadriceps Stretch with Strap - 1 x daily - 7 x weekly - 1 sets - 3 reps - 20s hold  Seated Hamstring Stretch - 1 x daily - 7 x weekly - 1 sets - 3 reps - 20s hold  Hooklying Hamstring Stretch with Strap - 1 x daily - 7 x weekly - 2 sets - 3 reps - 20s hold  Clamshell with Resistance - 1 x daily - 7 x weekly - 2 sets - 10 reps  Bent Knee Fallouts with Alternating Legs - 1 x daily - 7 x weekly - 1 sets - 10 reps          Plan: [x] Continue current frequency toward long and short term goals.     [x] Specific Instructions for subsequent treatments: stated above      Time In: 0900 am  Time Out: 6731 am    Electronically signed by:  Duane Cough, PTA

## 2022-04-06 ENCOUNTER — APPOINTMENT (OUTPATIENT)
Dept: PHYSICAL THERAPY | Facility: CLINIC | Age: 77
End: 2022-04-06
Payer: MEDICARE

## 2022-04-06 ENCOUNTER — OFFICE VISIT (OUTPATIENT)
Dept: NEUROLOGY | Age: 77
End: 2022-04-06
Payer: MEDICARE

## 2022-04-06 VITALS
HEIGHT: 71 IN | HEART RATE: 58 BPM | BODY MASS INDEX: 26.88 KG/M2 | SYSTOLIC BLOOD PRESSURE: 130 MMHG | DIASTOLIC BLOOD PRESSURE: 72 MMHG | WEIGHT: 192 LBS

## 2022-04-06 DIAGNOSIS — M54.16 LUMBAR RADICULOPATHY: ICD-10-CM

## 2022-04-06 DIAGNOSIS — G31.84 MILD COGNITIVE IMPAIRMENT: Primary | ICD-10-CM

## 2022-04-06 PROCEDURE — 1123F ACP DISCUSS/DSCN MKR DOCD: CPT | Performed by: NURSE PRACTITIONER

## 2022-04-06 PROCEDURE — 99214 OFFICE O/P EST MOD 30 MIN: CPT | Performed by: NURSE PRACTITIONER

## 2022-04-06 PROCEDURE — 1036F TOBACCO NON-USER: CPT | Performed by: NURSE PRACTITIONER

## 2022-04-06 PROCEDURE — 4040F PNEUMOC VAC/ADMIN/RCVD: CPT | Performed by: NURSE PRACTITIONER

## 2022-04-06 PROCEDURE — G8417 CALC BMI ABV UP PARAM F/U: HCPCS | Performed by: NURSE PRACTITIONER

## 2022-04-06 PROCEDURE — G8427 DOCREV CUR MEDS BY ELIG CLIN: HCPCS | Performed by: NURSE PRACTITIONER

## 2022-04-06 NOTE — PROGRESS NOTES
[facility]            [place], [address]          [place], [address] [facility]          Dept: [phone]          Dept Fax: [phone]    MD Radha Balbuena MD Ahmed B. Alfredia Bacca, MD Lavaun Calkins, MD Lorne Churches, CNP            4/6/2022      HISTORY OF PRESENT ILLNESS:       I had the pleasure of seeing Alberto Lozano, who returns for continuing neurologic care. The patient was seen last on December 1, 2021 for treatment of a mild cognitive impairment and a diabetic neuropathy. The patient has a mild cognitive impairment with a previous score of 28/30 on the MMSE. For management he was started on aricept 5 mg titrating to 10 mg at bedtime daily. T. Pallidum antibodies, a vitamin B12 level and TSH were all normal in March 2022. He has remained compliant to aricept and denies any worsening of his memory prior to today's visit. He denies any side effects associated with aricept administration. He has continued to drive and denies any difficulties with getting lost.     For management of his diabetic neuropathy he is prescribed gabapentin 300 mg twice daily. The patient recently was also referred to physical therapy for midline low back pain. The pain is aggravated by standing or walking and improved by sitting down. His primary care provider is handling this at this time. Testing reviewed:    Labs Completed 3/16/2022  T. pallidum, IgG NONREACTIVE NONREACTIVE      Vitamin B-12 232 - 1245 pg/mL 483      TSH 0.30 - 5.00 mIU/L 1.29       XR Lumbar Spine 3/14/2022  Impression   Moderately advanced lumbar spondylotic changes, most prominent at L2-L3 and   L4-L5. MRI Brain WO Contrast 11/19/2021  Impression   1. No acute intracranial abnormality. 2. No mass effect, edema or hemorrhage.    3. Moderate volume loss is seen in the cerebrum with moderate chronic   microvascular ischemic changes.                   PAST MEDICAL HISTORY: Diagnosis Date    Depression     Diabetes mellitus ([facility] [address]     Hypertension     Neuropathy         PAST SURGICAL HISTORY:         Procedure Laterality Date    KNEE SURGERY          SOCIAL HISTORY:     Social History     Socioeconomic History    Marital status:      Spouse name: Not on file    Number of children: Not on file    Years of education: Not on file    Highest education level: Not on file   Occupational History    Not on file   Tobacco Use    Smoking status: Former Smoker     Packs/day: 1.00     Years: 15.00     Pack years: 15.00     Types: Cigarettes     Start date: 1972     Quit date:      Years since quittin.2    Smokeless tobacco: Never Used   Vaping Use    Vaping Use: Never used   Substance and Sexual Activity    Alcohol use: Not Currently    Drug use: Never    Sexual activity: Not Currently   Other Topics Concern    Not on file   Social History Narrative    Not on file     Social Determinants of Health     Financial Resource Strain:     Difficulty of Paying Living Expenses: Not on file   Food Insecurity:     Worried About [address] in the Last Year: Not on file    [address] in the Last Year: Not on file   Transportation Needs:     Lack of Transportation (Medical): Not on file    Lack of Transportation (Non-Medical):  Not on file   Physical Activity:     Days of Exercise per Week: Not on file    Minutes of Exercise per Session: Not on file   Stress:     Feeling of Stress : Not on file   Social Connections:     Frequency of Communication with Friends and Family: Not on file    Frequency of Social Gatherings with Friends and Family: Not on file    Attends Sikhism Services: Not on file    Active Member of Clubs or Organizations: Not on file    Attends Club or Organization Meetings: Not on file    Marital Status: Not on file   Intimate Partner Violence:     Fear of Current or Ex-Partner: Not on file    Emotionally Abused: Not on file    Physically Abused: Not on file    Sexually Abused: Not on file   Housing Stability:     Unable to Pay for Housing in the Last Year: Not on file    Number of Places Lived in the Last Year: Not on file    Unstable Housing in the Last Year: Not on file       CURRENT MEDICATIONS:     Current Outpatient Medications   Medication Sig Dispense Refill    baclofen (LIORESAL) 10 MG tablet Take 1 tablet by mouth 2 times daily 60 tablet 3    finasteride (PROSCAR) 5 MG tablet Take 1 tablet by mouth daily 90 tablet 1    sitaGLIPtan-metFORMIN (JANUMET)  MG per tablet Janumet  MG Oral Tablet TAKE 1 TABLET TWICE DAILY WITH MEALS. Refills: 0 Active 180 tablet 5    simvastatin (ZOCOR) 20 MG tablet TAKE 1 TABLET NIGHTLY 90 tablet 3    ondansetron (ZOFRAN) 4 MG tablet Take 1 tablet by mouth 3 times daily as needed for Nausea or Vomiting 15 tablet 0    omeprazole (PRILOSEC) 40 MG delayed release capsule Take 1 capsule by mouth every morning (before breakfast) 90 capsule 1    donepezil (ARICEPT) 10 MG tablet Take 1 tablet by mouth nightly 90 tablet 3    amLODIPine (NORVASC) 2.5 MG tablet Take 1 tablet by mouth daily 90 tablet 3    tamsulosin (FLOMAX) 0.4 MG capsule Take 1 capsule by mouth daily 90 capsule 11    meloxicam (MOBIC) 7.5 MG tablet Take 1 tablet by mouth daily 90 tablet 5    glimepiride (AMARYL) 2 MG tablet Take 2 tablets by mouth every morning 180 tablet 5    gabapentin (NEURONTIN) 300 MG capsule Take 1 capsule by mouth 2 times daily for 30 days. 180 capsule 5     No current facility-administered medications for this visit. ALLERGIES:   No Known Allergies                              REVIEW OF SYSTEMS        All items selected indicate a positive finding. Those items not selected are negative.   Constitutional [] Weight loss/gain   [] Fatigue  [] Fever/Chills   HEENT [] Hearing Loss  [] Visual Disturbance  [] Tinnitus  [] Eye pain   Respiratory [] Shortness of Breath  [] Cough  [] Snoring   Cardiovascular [] Chest Pain  [] Palpitations  [] Lightheaded   GI [] Constipation  [] Diarrhea  [] Swallowing change  [] Nausea/vomiting    [] Urinary Frequency  [] Urinary Urgency   Musculoskeletal [] Neck pain  [x] Back pain  [] Muscle pain  [] Restless legs   Dermatologic [] Skin changes   Neurologic [x] Memory loss/confusion  [] Seizures  [] Trouble walking or imbalance  [] Dizziness  [] Sleep disturbance  [] Weakness  [] Numbness  [] Tremors  [] Speech Difficulty  [] Headaches  [] Light Sensitivity  [] Sound Sensitivity   Endocrinology []Excessive thirst  []Excessive hunger   Psychiatric [] Anxiety/Depression  [] Hallucination   Allergy/immunology []Hives/environmental allergies   Hematologic/lymph [] Abnormal bleeding  [] Abnormal bruising         PHYSICAL EXAMINATION:       Vitals:    04/06/22 1400   BP: 130/72   Pulse: 58                                              .                                                                                                    General Appearance:  Alert, cooperative, no signs of distress, appears stated age   Head:  Normocephalic, no signs of trauma   Eyes:  Conjunctiva/corneas clear;  eyelids intact   Ears:  Normal external ear and canals   Nose: Nares normal, mucosa normal, no drainage    Throat: Lips and tongue normal; teeth normal;  gums normal   Neck: Supple, intact flexion, extension and rotation;   trachea midline;  no adenopathy;   thyroid: not enlarged;   no carotid pulse abnormality   Back:   Symmetric, no curvature, ROM adequate   Lungs:   Respirations unlabored   Heart:  Regular rate and rhythm           Extremities: Extremities normal, no cyanosis, no edema   Pulses: Symmetric over head and neck   Skin: Skin color, texture normal, no rashes, no lesions                                     NEUROLOGIC EXAMINATION    Neurologic Exam  Mental status    Alert and oriented x 3; intact memory with no confusion, speech or language problems; no L4-5  1. Continue physical therapy, recommended the patient make a sooner appointment if his back pain is not resolved with physical therapy  3. Diabetic neuropathy with severely diminished vibration, temperature and pinprick sensation in a glove stocking distribution to the bilateral ankles  1. Continue gabapentin 300 mg twice daily              Signed: Maryann Brady CNP      *Please note that portions of this note were completed with a voice recognition program.  Although every effort was made to insure the accuracy of this automated transcription, some errors in transcription may have occurred, occasionally words and are mis-transcribed    Provider Attestation: The documentation recorded by the scribe accurately reflects the service I personally performed and the decisions made by myself. Portions of this note were transcribed by a scribe. I personally performed the history, physical exam, and medical decision-making and confirm the accuracy of the information in the transcribed note. Scribe Attestation:   By signing my name below, Brittanie Simpson, attest that this documentation has been prepared under the direction and in the presence of Maryann Brady CNP.

## 2022-04-11 ENCOUNTER — HOSPITAL ENCOUNTER (OUTPATIENT)
Dept: PHYSICAL THERAPY | Facility: CLINIC | Age: 77
Setting detail: THERAPIES SERIES
Discharge: HOME | End: 2022-04-11
Payer: MEDICARE

## 2022-04-11 PROCEDURE — 97110 THERAPEUTIC EXERCISES: CPT

## 2022-04-11 NOTE — FLOWSHEET NOTE
[] [facility]) - [facility] &  Therapy  [address]  [phone]  F: [phone] [x] [address]  [facility] [address]   [address]  P: [phone]  F: [phone] [] [address] &  Therapy  [address] [facility]  P: [phone]  F: [phone] [] [address]  P: [phone]  F: [phone] [] [address]  [facility]   [address]   [place]: [phone]  F: [phone]      Physical Therapy Daily Treatment Note    Date:  2022  Patient Name:  Juan Luis Kebede    :  1945  MRN: [id number]  Physician: CRISTINA Escobar CNP                         Insurance: MEDICARE (No Copay)/[facility]   Medical Diagnosis: M47.816 (ICD-10-CM) - Lumbar spondylosis  Rehab Codes: M54.59, M25.651, M25.652, M62.81, Z91.81  Onset Date: 2020                    Next 's appt.: 2022  Visit# / total visits: ;   Progress note for Medicare patient due at visit 10     Cancels/No Shows: 0/0    Subjective:     Pain:  [x] Yes  [] No Location: low back centralized, right = left   Pain Rating: (0-10 scale) 7/10  Pain altered Tx:  [] No  [x] Yes  Action:     Comments: Pt reports back is \"bothering him a lot, not sure therapy is helping\"  Saw a provider at neurology office who mentioned he didn't have a MRI, X ray would not show a herniated disc.     Objective:  Modalities:   Precautions:  Exercises: BOLD = COMPLETED TODAY   Exercise Reps/ Time Weight/ Level Comments             Supine         PPT  10x5s   Cues to stop holding breath   B HS stretch 2x30s ea strap 3/28-inc hold duration   TrA w/ marching  2x10   Added 3/28   TrA w/ knee fallout  2x10  3/30-reduce from 2 to 1 set due to inc LBP    B SLR w TrA 10x ea   3/30-perform in small ROM to avoid inc LBP TrA w/ heel walkout  10x ea  Added 3/30             Sidelying         Clamshell 10x ea Lime  Cues to engage core   B hip abd w/ TrA 15x ea  Lime 3/30-added resistance   Open book  10x ea  Added 3/28             Prone         B hip flexors stretch 2x30s ea strap 3/28-inc hold duration          Sit to Stance 10x ea Chair w/ armrest Added 3/28-arm cross chest      TG squat 10x  lvl 17 Added 3/28         Seated      LAQ 10x5s 2# DB Added 3/30   Trunk rot w/ arm cross 10x  Added 3/30   Sitting, 10x reaching up overhead, extending spine 10x  Added 4/11   Standing       Partial squat at elevated mat  2x5 55cm Added 3/30, able to perform without use of arms                     Other:      Specific Instructions for next treatment:  - Progress core strength  - Improve BLE strength (Work on STS w/ inc WB on BLE & reduced forward trunk lean compensation when ascending from chair)  - Improve flexibility of B hip flexors/quad, B HS, B gastroc/soleus  - Improve quad eccentric control    - Improve lumbar AROM in all ranges  - Postural education       Treatment Charges: Mins Units   []  Modalities     [x]  Ther Exercise 43 3   []  Manual Therapy     []  Ther Activities     []  Aquatics     []  Vasocompression     []  Other     Total Treatment time 43 3     Total Est Medicare Cost as of 4/11/2022: $420.41         Assessment: [x] Progressing toward goals:  Patient reports no pain in legs, always in lower back, localized. Since started therapy did fall carrying a heavy door alone, door tipped and fell. Explained benefit of PT for strengthening or core and LE. Patient reports he does need the strengthening. May want to see physician sooner if does not feel he is improving. [] No change.      [] Other:   [x] Patient would continue to benefit from skilled physical therapy services in order to: manage pain, improve lumbar ROM, promote core stabilization with daily activities, improve BLE functional strength and flexibility, improve proprioception/SLS balance to reduce fall risks, and promote safe lifting/carrying technique to assist pt in returning to prior function.         STG: (to be met in 6 treatments)  1. Pt will reduce pain to at least 5/10 to ease difficulty with yard work activities  2. Pt will improve R Ely's result to at least 80° to demo improved R hip flexors/quad muscle length to participate in household activities  3. Pt will improve R SLR result to at least 58° to demo improved R HS muscle length to ease difficulty with bending-related activities  4. Pt will improve lumbar ROM to less than 50% with all movements to ease difficulty with daily functional activities    5. Pt will demo safe lifting/carrying techniques to perform light daily duties with decreased stress to lumbar spine   6. Pt will improve overall B hip strength to at least 4/5 to ease difficulty with daily ambulation   7. Patient to be independent with home exercise program as demonstrated by performance with correct form without cues. 8. Demonstrate Knowledge of fall prevention     LTG: (to be met in 12 treatments)  1. Pt will improve Oswestry score from 28% impaired to less than 20% impaired to demo improved tolerance with standing activities   2. Pt will demo improved BLE functional strength as evident by completing 5xSTS in under 15sec without BUE support and without increased forward trunk lean compensation   3. Pt will be able to maintain SLS balance EO within at least 5 sec to demo improved proprioception to reduce fall risk       Pt. Education:  [x] Yes  [] No  [] Reviewed Prior HEP/Ed  Method of Education: [x] Verbal  [x] Demo  [] Written: fall prevention handout   4- encouraged to continue with HEP for strengthening of legs and core  Comprehension of Education:  [] Verbalizes understanding. [] Demonstrates understanding. [] Needs review. [x] Demonstrates/verbalizes HEP/Ed previously given.       Access Code: [email or web address]  URL: [email or web address]  Date: 03/28/2022  Prepared by: Shakira Ruano    Exercises  Supine Posterior Pelvic Tilt - 1 x daily - 7 x weekly - 1 sets - 10 reps - 5s hold  Clamshell - 1 x daily - 7 x weekly - 2 sets - 10 reps  Prone Quadriceps Stretch with Strap - 1 x daily - 7 x weekly - 1 sets - 3 reps - 20s hold  Seated Hamstring Stretch - 1 x daily - 7 x weekly - 1 sets - 3 reps - 20s hold  Hooklying Hamstring Stretch with Strap - 1 x daily - 7 x weekly - 2 sets - 3 reps - 20s hold  Clamshell with Resistance - 1 x daily - 7 x weekly - 2 sets - 10 reps  Bent Knee Fallouts with Alternating Legs - 1 x daily - 7 x weekly - 1 sets - 10 reps          Plan: [x] Continue current frequency toward long and short term goals.     [x] Specific Instructions for subsequent treatments: stated above      Time In:  9:02 am Time Out: 9: 45 am    Electronically signed by:  Bozena Levy PT

## 2022-04-18 ENCOUNTER — HOSPITAL ENCOUNTER (OUTPATIENT)
Dept: PHYSICAL THERAPY | Facility: CLINIC | Age: 77
Setting detail: THERAPIES SERIES
Discharge: HOME | End: 2022-04-18
Payer: MEDICARE

## 2022-04-18 NOTE — FLOWSHEET NOTE
[] [address]  [address]    [phone]  F: [phone]   [x] [address] [facility] [address]   [address]  P: [phone]  F: [phone]  [] [address]  [address]  P: [phone]  F: [phone] [] [address]  P: [phone]  F: [phone]  [] [address]  [facility]   [address]   [place]: [phone]  F: [phone]   [] [address]  [place]: [phone]   F: [phone]     Physical Therapy Cancel/No Show note    Date: 2022  Patient: Douglas King  : 1945  MRN: [id number]    Cancels/No Shows to date:     For today's appointment patient:    [x]  Cancelled    [] Rescheduled appointment    [] No-show     Reason given by patient:    []  Patient ill    []  Conflicting appointment    [] No transportation      [] Conflict with work    [] No reason given    [] Weather related    [] COVID-19    [x] Other:      Comments: Pt called to cancel - jose martin has a stroke. Will call back to re-schedule. [] Next appointment was confirmed    Electronically signed by:  Shakira Meneses, PT

## 2022-04-29 DIAGNOSIS — M48.02 CERVICAL STENOSIS OF SPINAL CANAL: ICD-10-CM

## 2022-04-29 RX ORDER — GABAPENTIN 300 MG/1
CAPSULE ORAL
Qty: 180 CAPSULE | Refills: 0 | Status: SHIPPED | OUTPATIENT
Start: 2022-04-29 | End: 2022-08-01

## 2022-05-06 NOTE — DISCHARGE SUMMARY
[] [facility]) - [facility] &  Therapy  [address]  [phone]  F: [phone] [x] [address]  [facility]   [address]  P: [phone]  F: [phone] [] [place]  [address]  P: [phone]  F: [phone] [] [address]  P: [phone]  F: [phone] [] [address]  [facility]   [address]   [place]: [phone]  F: [phone]    Physical Therapy Discharge Note    Date: 2022      Patient: Nicol Rg  : 1945  MRN: [id number]    Physician: Damion Ferrell, APRN - PGN                         Insurance: MEDICARE (No Copay)/[facility]   Medical Diagnosis: M47.816 (ICD-10-CM) - Lumbar spondylosis  Rehab Codes: M54.59, M25.651, M25.652, M62.81, Z91.81  Onset Date: 2020                    Next 's appt.: 2022  Visit# / total visits: ;                   Progress note for Medicare patient due at visit 10                               Cancels/No Shows:   Date of initial visit: 3/23/22                Date of final visit: 22       Discharge Status:     Pt failed to make additional appointments for therapy. Pt. Is now discharged. Electronically signed by: Shakira Maharaj PT    If you have any questions or concerns, please don't hesitate to call.   Thank you for your referral.

## 2022-05-27 ENCOUNTER — HOSPITAL ENCOUNTER (OUTPATIENT)
Dept: PHYSICAL THERAPY | Facility: CLINIC | Age: 77
Setting detail: THERAPIES SERIES
Discharge: HOME | End: 2022-05-27
Payer: MEDICARE

## 2022-05-27 PROCEDURE — 97110 THERAPEUTIC EXERCISES: CPT

## 2022-05-27 PROCEDURE — 97161 PT EVAL LOW COMPLEX 20 MIN: CPT

## 2022-05-27 NOTE — PLAN OF CARE
[] [facility]) - [facility] TWELVESTEP [facility] &  Therapy  [address]  [phone]  F: [phone] [x] [address]  [facility]   [address]  P: [phone]  F: [phone] [] [address] &  Therapy  [address]  P: [phone]  F: [phone] [] [address]  P: [phone]  F: [phone] [] [address]  [facility]   [address]   [place]: [phone]  F: [phone]        Physical Therapy Plan of Care    Date:  2022  Patient: Rometta Kocher  : 1945  MRN: [id number]  Physician: CRISTINA García - CNP                        Insurance: MEDICARE (No Copay)/[facility]   Medical Diagnosis: M47.816 (ICD-10-CM) - Lumbar spondylosis  Rehab Codes: M54.59, M25.651, M25.652, R29.3, M62.81  Onset Date: 2020                    Next 's appt.: 22        Subjective:  Pt returned to physical therapy for similar c/o low back pain which pt was initially eval and treat at this outpatient facility from 3/23/22 to 22 which pt had to postpone therapy due to family issue (grandson had a stroke). Pt stated currently increased of low back pain with bending over to lift objects (medium to heavy weight) or tried to help grandson from getting up from floor since \"he's been falling a lot since the stroke and he's deadweight 200#\", noted used to be able to climb up/down ladder to put stuff up the shelf in garage however currently has not been feeling steady when climbing ladder as well as feeling more unsteady with ambulation due to feeling increased weakness in BLE. Pt stated was doing HEP for awhile however has been busy taking care of grandson who had a stroke recently and was not doing HEP since taking care of grandson.   Pt stated cont to use Lidocaine patch and sleep on recliner to reduce pain and has recently stopped taking Tylenol. Pt noted also went to see Roshan Powell CNP for memory loss issue and was told to get an MRI to further diagnose vertebral disc problem and was suggesting pt to get surgery for low back symptoms. Pt stated has been having balance issue due to BLE weakness and less concerns about back pain.            Pain present? [x]? ? Yes  []? ? No          Location  Low back    Pain Rating currently  (0-10 scale)   3/10   Pain at worse 10/10   Pain at best 5/10   Description of pain Intermittent, dull, throbbing   Altered Sensation No numbness/tingling to BLE   What makes it worse Bending, twisting, lifting    What makes it better Lidocaine patch, stay in recliner   Symptom progression \"about the same\"   Sleep Disturbed (not d/t pain)              Assessment: 68 male patient returned to physical therapy with c/o low back pain with new onset of loss of balance with standing and walking activities. Pt demo poor core strength, impaired posture, reduced lumbar ROM especially B SB, decreased BLE strength L>R, and reduced flexibility of L hip flexors/quad and B hamstrings. Pt also c/o increased muscle tightness along lower thoracic-lumbar paraspinals with all lumbar mobility, decreased standing balance, and impaired gait. Patient would benefit from skilled physical therapy services in order to: manage pain, improve lumbar mobility particularly with B SB ROM, improve flexibility of L hip flexors/quad and B hamstrings, improve core stabilizers and BLE strength L>R, and improve standing balance to promote BLE controlled stability to prevent fall risk and assist pt in returning to prior level of function.      Problems:    [x]? ? Pain: 3-10/10 low back   [x]? ? ROM: lumbar B SB, decrease L hip flexors/quad and B HS flexibility   [x]? ? Strength: BLE strength L>R  [x]? ? Function: Oswestry 46% impaired   [x]?  Other: impaired posture        STG: (to be met in 6 treatments)  1. Pt will reduce pain to less than or equal to 4/10 with ADLs  2. Pt will be able to achieve at least 0-30° B 90-90 SLR to demo improved B hamstrings flexibility to ease difficulty with daily ambulation   3. Pt will improve LLE strength to at least 4/5 globally to reduce difficulty with caregiver-related tasks   4. Pt will be able to maintain B tandem stance for at least 10sec with minimal to no sway to demo improved BLE stability   5. Pt will improve B lumbar SB ROM to less than 50% limited to demo reduced lumbar paraspinals guarding to participate in daily functional activities   6. Pt will improve L hip flexors/quad flexibility as evident by achieving at least 90° L Ely's to ease difficulty with stairs negotiation   7. Patient to be independent with home exercise program as demonstrated by performance with correct form without cues.     LTG: (to be met in 12 treatments)  1. Pt will improve Oswestry score from 46% impaired to less than 36% impaired to demo improved functional mobility to participate in daily functional activities  2. Pt will be able to maintain B SLS for at least 10sec to demo improved BLE stability   3. Pt will be able to complete 5xSTS without BUE support in less than 20sec to demo improved functional strength of BLE         Patient goals: \"to doing things I want\"           Treatment Plan:  [x]? Therapeutic Exercise   32476             []? Iontophoresis: 4 mg/mL Dexamethasone Sodium Phosphate  mAmin  55453   []? Therapeutic Activity  67587 []? Vasopneumatic cold with compression  [id number]               []? Gait Training    34811 []? Ultrasound        [id number]   [x]? Neuromuscular Re-education  [phone] []? Electrical Stimulation Unattended  [id number]   [x]? Manual Therapy  77952 []? Electrical Stimulation Attended  [id number]   [x]? Instruction in HEP       []? Lumbar/Cervical Traction  [id number]   []? Aquatic Therapy           [id number] [x]? Cold/hotpack     []?  Massage 00434      []? Dry Needling, 1 or 2 muscles  73212   []? Biofeedback, first 15 minutes   36706  []? Biofeedback, additional 15 minutes   12627 []? Dry Needling, 3 or more muscles  04044         Frequency: 2 x/week for 12 visits        Electronically signed by: Shakira Nelson PT        Physician Signature:________________________________Date:__________________  By signing above or cosigning this note, I have reviewed this plan of care and certify a need for medically necessary rehabilitation services.      *PLEASE SIGN ABOVE AND FAX BACK ALL PAGES*

## 2022-05-27 NOTE — CONSULTS
[] [facility]) - [facility] TWELVESTEP [facility] &  Therapy  [address]  [phone]  F: [phone] [x] [address]  [facility] 36   [address]  P: [phone]  F: [phone] [] [address] &  Therapy  [address]  P: [phone]  F: [phone] [] [address]  P: [phone]  F: [phone] [] [address]  [facility]   [address]   [place]: [phone]  F: [phone]          Physical Therapy Spine Evaluation    Date:  2022  Patient: Nicol Rg  : [birth year]  MRN: [id number]  Physician: CRISTINA Nunes CNP                        Insurance: MEDICARE (No Copay)/[facility]   Medical Diagnosis: M47.816 (ICD-10-CM) - Lumbar spondylosis  Rehab Codes: M54.59, M25.651, M25.652, R29.3, M62.81  Onset Date: 2020                    Next 's appt.: 22       Subjective:  Pt returned to physical therapy for similar c/o low back pain which pt was initially eval and treat at this outpatient facility from 3/23/22 to 22 which pt had to postpone therapy due to family issue (grandson had a stroke). Pt stated currently increased of low back pain with bending over to lift objects (medium to heavy weight) or tried to help grandson from getting up from floor since \"he's been falling a lot since the stroke and he's deadweight 200#\", noted used to be able to climb up/down ladder to put stuff up the shelf in garage however currently has not been feeling steady when climbing ladder as well as feeling more unsteady with ambulation due to feeling increased weakness in BLE. Pt stated was doing HEP for awhile however has been busy taking care of grandson who had a stroke recently and was not doing HEP since taking care of grandson.   Pt stated cont to use Lidocaine patch and sleep on recliner to reduce pain and has recently stopped taking Tylenol. Pt noted also went to see Geoff Ashton CNP for memory loss issue and was told to get an MRI to further diagnose vertebral disc problem and was suggesting pt to get surgery for low back symptoms. Pt stated has been having balance issue due to BLE weakness and less concerns about back pain.          Pain present? [x]? Yes  []? No          Location  Low back    Pain Rating currently  (0-10 scale)   3/10   Pain at worse 10/10   Pain at best 5/10   Description of pain Intermittent, dull, throbbing   Altered Sensation No numbness/tingling to BLE   What makes it worse Bending, twisting, lifting    What makes it better Lidocaine patch, stay in recliner   Symptom progression \"about the same\"   Sleep Disturbed (not d/t pain)         Comorbidities:   [] Obesity [] Dialysis  [x] N/A   [] Asthma/COPD [] Dementia [] Other:   [] Stroke [] Sleep apnea [] Other:   [] Vascular disease [] Rheumatic disease [] Other:            PMHx: [x]? Refer to full medical chart  In EPIC       []? Unremarkable [x]? 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 HTN        []? Pacemaker      []? MI/Heart Problems           []? Cancer      [x]? Arthritis  []? Other: []? Other:             Tests:      Tests:    [x]? X-Ray:      Impression   Moderately advanced lumbar spondylotic changes, most prominent at L2-L3 and   L4-L5.         [] MRI:    [] Other:      Medications: [x] Refer to full medical record                          [] None                          [] Other:    Allergies:      [x] Refer to full medical record                         [] None                         [] Other:         Function:  Hand Dominance  [x]? Right  []? Left  Patient live with: Wife and grandson   Home type [x]? One story      Stairs from outside          Hand rail [x]? No - Ramp  [x]? Hand rail - Bilat           Stairs inside          Hand rail  []?  Yes - # steps            [x]? No   []? Hand rail - R/L         [x]? No Hand rail    Bathroom  [x]? Walk in shower with shower chair            Washing machine on [x]? Main level   []? Second level   []? Basement   Employment Retired   Job status []? Normal duty   []? Light duty   []? Off due to condition    [x]? Retired   []? Not employed   []? Disability  []? Other:  []? 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  Eliane Gene surgery (still has pain)  Grandson-recently had a stroke; dialysis appt 3x/week    Recreational   Activities Cleaning garage           ADL/IADL Previous level of function Current level of function Who currently assists the patient with task   Bathing  [x] Independent  [] Assist [x] Independent  [] Assist    Dress/grooming [x] Independent  [] Assist [x] Independent  [] Assist    Transfer/mobility [x] Independent  [] Assist [x] Independent  [] Assist    Feeding [x] Independent  [] Assist [x] Independent  [] Assist    Toileting [x] Independent  [] Assist [x] Independent  [] Assist    Driving [x] Independent  [] Assist [x] Independent  [] Assist    Housekeeping [x] Independent  [] Assist [x] Independent  [] Assist    Grocery shop/meal prep [x] Independent  [] Assist [x] Independent  [] Assist        Gait Prior level of function Current level of function    [x] Independent  [] Assist [x] Independent  [] Assist   Device: [x] Independent [x] Independent    [] Straight Cane [] Quad cane [] Straight Cane [] Quad cane    [] Standard walker [] Rolling walker   [] 4 wheeled walker [] Standard walker [] Rolling walker   [] 4 wheeled walker    [] Wheelchair [] Wheelchair     Gait Analysis:         Objective:    OBSERVATION No Deficit Deficit Not Tested Comments   Posture       Forward Head [] [x] []    Rounded Shoulders [] [x] []    Kyphosis [] [x] []    Lordosis [] [x] []    Leg Length Discrp [x] [] []    Slumped Sitting [] [x] []    Palpation [] [x] [] High tone throughout lower thoracic-lumbar paraspinals   Sensation [x] [] []    Edema [x] [] []    Neurological [x] [] []           STRENGTH    Left Right   L1-2 Hip Flex 4-/5 4+/5   Hip Abd 4-/5 4+/5   Knee Flex 4-/5 4-/5   L3-4 Knee Ext 4-/5 4/5        L4 Ankle DF 5/5 5/5   S1 Plant. Flex 5/5 5/5   Abdominals poor poor   Erector Spinae poor poor        *painful    Lumbar    Flexion 25% limited   Extension 50% limited    Rotation L 25% limited R 25% limited   Sidebend L 50% limited  R 50% limited      Comment: pt c/o increased B HS tightness w/ increased flexion and muscle tightness in lumbar paraspinals with lumbar extension, rotation, and SB    TESTS (+/-) LEFT RIGHT Not Tested   Hamstring (90/90 SLR) + 43 + 40 []   Ely's test  + 80 + 105 []   Slump/Dural - - []   Damion Tests ? Pain ? Pain No Change Not Tested   RFIS [] [] [x] []   BAYLEE [] [] [x] []   RFIL [] [] [] [x]   REIL [] [] [] [x]       BALANCE Left Right   Tandem Stance (Eyes Open) 4s w/ LOB 8s w/ LOB   Tandem Stance (Eyes Closed) nt nt   Single Leg (Eyes Open) 3s w/ LOB 5s w/ LOB   Single Leg (Eyes Closed nt nt   Other:           Functional Test: Oswestry Score: 27/50 or 46% functionally impaired     Comments:  5xSTS: 23s, BUE support, forward trunk lean for forward momentum to ascend from chair, poor eccentric control with descend to chair                   [address] Assessment    Patient Name:  Ed Corona  : 1945    Risk Factor Scale  Score   History of Falls [] Yes  [x] No 25  0 0   Secondary Diagnosis [] Yes  [x] No 15  0 0   Ambulatory Aid [] Furniture  [] Crutches/cane/walker  [x] None/bedrest/wheelchair/nurse 30  15  0 0   IV/Heparin Lock [] Yes  [x] No 20  0 0   Gait/Transferring [] Impaired  [x] Weak  [] Normal/bedrest/immobile 20  10  0 10   Mental Status [] Forgets limitations  [x] Oriented to own ability 15  0 0      Total: 10     Based on the Assessment score: check the appropriate box.     [x]  No intervention needed   Low =   Score of 0-24    []  Use standard prevention interventions Moderate =  Score of 24-44   [] Give patient handout and discuss fall prevention strategies   [] Establish goal of education for patient/family RE: fall prevention strategies    []  Use high risk prevention interventions High = Score of 45 and higher   [] Give patient handout and discuss fall prevention strategies   [] Establish goal of education for patient/family Re: fall prevention strategies   [] Discuss lifeline / other resources    Electronically signed by: Shakira Mena, PT  Date: 5/27/2022        Assessment: 68 male patient returned to physical therapy with c/o low back pain with new onset of loss of balance with standing and walking activities. Pt demo poor core strength, impaired posture, reduced lumbar ROM especially B SB, decreased BLE strength L>R, and reduced flexibility of L hip flexors/quad and B hamstrings. Pt also c/o increased muscle tightness along lower thoracic-lumbar paraspinals with all lumbar mobility, decreased standing balance, and impaired gait. Patient would benefit from skilled physical therapy services in order to: manage pain, improve lumbar mobility particularly with B SB ROM, improve flexibility of L hip flexors/quad and B hamstrings, improve core stabilizers and BLE strength L>R, and improve standing balance to promote BLE controlled stability to prevent fall risk and assist pt in returning to prior level of function. Problems:    [x] ? Pain: 3-10/10 low back   [x] ? ROM: lumbar B SB, decrease L hip flexors/quad and B HS flexibility   [x] ? Strength: BLE strength L>R  [x] ? Function: Oswestry 46% impaired   [x] Other: impaired posture          STG: (to be met in 6 treatments)  1. Pt will reduce pain to less than or equal to 4/10 with ADLs  2. Pt will be able to achieve at least 0-30° B 90-90 SLR to demo improved B hamstrings flexibility to ease difficulty with daily ambulation   3.  Pt will improve LLE strength to at least 4/5 globally to reduce difficulty with caregiver-related tasks   4. Pt will be able to maintain B tandem stance for at least 10sec with minimal to no sway to demo improved BLE stability   5. Pt will improve B lumbar SB ROM to less than 50% limited to demo reduced lumbar paraspinals guarding to participate in daily functional activities   6. Pt will improve L hip flexors/quad flexibility as evident by achieving at least 90° L Ely's to ease difficulty with stairs negotiation   7. Patient to be independent with home exercise program as demonstrated by performance with correct form without cues. LTG: (to be met in 12 treatments)  1. Pt will improve Oswestry score from 46% impaired to less than 36% impaired to demo improved functional mobility to participate in daily functional activities  2. Pt will be able to maintain B SLS for at least 10sec to demo improved BLE stability   3. Pt will be able to complete 5xSTS without BUE support in less than 20sec to demo improved functional strength of BLE       Patient goals: \"to doing things I want\"         Rehab Potential:  [] Good  [x] Fair  [] Poor   Suggested Professional Referral:  [x] No  [] Yes:  Barriers to Goal Achievement:  [] No  [x] Yes: caregiver for both wife who has back surgery and grandson who has a stroke both recently   Domestic Concerns:  [x] No  [] Yes:      Pt. Education:  [x] Plans/Goals, Risks/Benefits discussed  [x] Home exercise program  Method of Education: [x] Verbal  [x] Demo  [x] Written  Comprehension of Education:  [x] Verbalizes understanding. [x] Demonstrates understanding. [x] Needs Review. [] Demonstrates/verbalizes understanding of HEP/Ed previously given. Access Code: [email or web address]  URL: [email or web address]  Date: 05/27/2022  Prepared by:  Shakira Maharaj    Exercises  Supine Lower Trunk Rotation - 1 x daily - 7 x weekly - 1 sets - 10 reps - 5s hold  Gastroc Stretch on Wall - 1 x daily - 7 x weekly - 1 sets - 3 reps - 20s hold  Standing March with Counter Support - 1 x daily - 7 x weekly - 2 sets - 10 reps  Standing Hip Flexion with Counter Support - 1 x daily - 7 x weekly - 2 sets - 10 reps  Standing Hip Abduction with Counter Support - 1 x daily - 7 x weekly - 2 sets - 10 reps  Standing Hip Extension with Counter Support - 1 x daily - 7 x weekly - 2 sets - 10 reps  Clamshell with Resistance - 1 x daily - 7 x weekly - 2 sets - 10 reps        Treatment Plan:  [x] Therapeutic Exercise   43325  [] Iontophoresis: 4 mg/mL Dexamethasone Sodium Phosphate  mAmin  20147   [] Therapeutic Activity  20705 [] Vasopneumatic cold with compression  89672    [] Gait Training   40862 [] Ultrasound   [id number]   [x] Neuromuscular Re-education  17555 [] Electrical Stimulation Unattended  16742   [x] Manual Therapy  20567 [] Electrical Stimulation Attended  07010   [x] Instruction in HEP  [] Lumbar/Cervical Traction  04044   [] Aquatic Therapy   95757 [x] Cold/hotpack    [] Massage   64949      [] Dry Needling, 1 or 2 muscles  03109   [] Biofeedback, first 15 minutes   68553  [] Biofeedback, additional 15 minutes   08030 [] Dry Needling, 3 or more muscles  82301       Frequency: 2 x/week for 12 visits        Todays Treatment:  Modalities:   Precautions:  Exercises:  Exercise Reps/ Time Weight/ Level Comments         Seated       B HS stretch 3x10s ea     Abdominal bracing  10s           Standing       Marching  10x      Heel raises  10x      3 ways hip 10x ea      Other:    Specific Instructions for next treatment:  - Initiate core strength  - Progress BLE strength and flexibility of B hip flexors/quad L>R and B hamstrings   - Standing balance training on various surfaces  - CP/HP as needed pre-/post-treatment to manage pain and muscle guarding         Evaluation Complexity:  History (Personal factors, comorbidities) [] 0 [] 1-2 [x] 3+   Exam (limitations, restrictions) [] 1-2 [x] 3 [] 4+   Clinical presentation (progression) [x] Stable [] Evolving  [] Unstable

## 2022-05-31 ENCOUNTER — HOSPITAL ENCOUNTER (OUTPATIENT)
Dept: PHYSICAL THERAPY | Facility: CLINIC | Age: 77
Setting detail: THERAPIES SERIES
Discharge: HOME | End: 2022-05-31
Payer: MEDICARE

## 2022-05-31 PROCEDURE — 97110 THERAPEUTIC EXERCISES: CPT

## 2022-05-31 NOTE — FLOWSHEET NOTE
[] [address]  [address]  [phone]  F: [phone] [x] [address]  [facility]   [address]  P: [phone]  F: [phone] [] [address]  [address]  P: [phone]  F: [phone] [] [address]  P: [phone]  F: [phone] [] [address]  [facility]   [address]   [place]: [phone]  F: [phone]      Physical Therapy Daily Treatment Note    Date:  2022  Patient Name:  Fara Mccauley    :  1945  MRN: [id number]  Physician: CRISTINA García - CNP                        Insurance: MEDICARE (No Copay)/[facility]   Medical Diagnosis: M47.816 (ICD-10-CM) - Lumbar spondylosis  Rehab Codes: M54.59, M25.651, M25.652, R29.3, M62.81  Onset Date: 2020                    Next 's appt.: 22  Visit# / total visits: ; Progress note for Medicare patient due at visit 10     Cancels/No Shows:     Subjective:    Pain:  [] Yes  [x] No Location: LBP   Pain Rating: (0-10 scale) 0/10  Pain altered Tx:  [x] No  [] Yes  Action:  Comments: Pt arrives without any pain. Reports his grandson had a heart attack over the weekend and he has been trying to visit him. Mentions poor compliance to HEP as he's been spending more time helping his grandson.       Objective:  Modalities:   Precautions:  Exercises:  Exercise BILAT Reps/ Time Weight/ Level Comments             Seated          Lumbar flexion stretch  10x       Abdominal bracing  10x       Trunk rotation  10x Ball  Arms extended holding ball    Side bend stretch  5x5\"     Trunk extension over bolster  10x5\"           Supine       Hs stretch B 3x15\" Strap     L hip flexor stretch  1'     PPT 10x     SLR 10x     Single knee fallout + TA 10x     Marches + TA 10x           Side lying       Hip abd  10x           Standing          Marching  10x        Heel raises  10x        3 ways hip 10x ea        Hs curls  10x     NBOS on foam eyes open 2x30\"     NBOS on foam eyes closed 2x30\"     Tandem stance  2x20\"ea           Other:      Specific Instructions for next treatment:  - Initiate core strength  - Progress BLE strength and flexibility of B hip flexors/quad L>R and B hamstrings   - Standing balance training on various surfaces  - CP/HP as needed pre-/post-treatment to manage pain and muscle guarding             Treatment Charges: Mins Units   []  Modalities     [x]  Ther Exercise 40 3   []  Manual Therapy     []  Ther Activities     []  Aquatics     []  Vasocompression     []  Other     Total Treatment time 40 3       Assessment: [x] Progressing toward goals. Initiated session with lumbar stretches to improve muscular flexibility. Implemented therapeutic exercise program to improve B LE strength and core stabilization. Incorporated balance activities this date with ankle strategies present and min trunk sway during NBOS on foam and tandem stance. No complaints throughout session. Pt fatigued post tx. Will monitor sx's and progress program as tolerated. [] No change. [] Other:  [x] Patient would continue to benefit from skilled physical therapy services in order to: manage pain, improve lumbar mobility particularly with B SB ROM, improve flexibility of L hip flexors/quad and B hamstrings, improve core stabilizers and BLE strength L>R, and improve standing balance to promote BLE controlled stability to prevent fall risk and assist pt in returning to prior level of function. STG/LTG  Problems:    [x]? ? Pain: 3-10/10 low back   [x]? ? ROM: lumbar B SB, decrease L hip flexors/quad and B HS flexibility   [x]? ? Strength: BLE strength L>R   [x]? ? Function: Oswestry 46% impaired   [x]?  Other: impaired posture            STG: (to be met in 6 treatments)  1. Pt will reduce pain to less than or equal to 4/10 with ADLs  2. Pt will be able to achieve at least 0-30° B 90-90 SLR to demo improved B hamstrings flexibility to ease difficulty with daily ambulation   3. Pt will improve LLE strength to at least 4/5 globally to reduce difficulty with caregiver-related tasks   4. Pt will be able to maintain B tandem stance for at least 10sec with minimal to no sway to demo improved BLE stability   5. Pt will improve B lumbar SB ROM to less than 50% limited to demo reduced lumbar paraspinals guarding to participate in daily functional activities   6. Pt will improve L hip flexors/quad flexibility as evident by achieving at least 90° L Ely's to ease difficulty with stairs negotiation   7. Patient to be independent with home exercise program as demonstrated by performance with correct form without cues.     LTG: (to be met in 12 treatments)  1. Pt will improve Oswestry score from 46% impaired to less than 36% impaired to demo improved functional mobility to participate in daily functional activities  2. Pt will be able to maintain B SLS for at least 10sec to demo improved BLE stability   3. Pt will be able to complete 5xSTS without BUE support in less than 20sec to demo improved functional strength of BLE         Patient goals: \"to doing things I want\"     Pt. Education:  [x] Yes  [] No  [x] Reviewed Prior HEP/Ed  Method of Education: [x] Verbal  [x] Demo ex's  [] Written    Access Code: [email or web address]  URL: [email or web address] [email or web address]  Date: 05/27/2022  Prepared by:  Shakira Hillman Done     Exercises  Supine Lower Trunk Rotation - 1 x daily - 7 x weekly - 1 sets - 10 reps - 5s hold  Gastroc Stretch on Wall - 1 x daily - 7 x weekly - 1 sets - 3 reps - 20s hold  Standing March with Counter Support - 1 x daily - 7 x weekly - 2 sets - 10 reps  Standing Hip Flexion with Counter Support - 1 x daily - 7 x weekly - 2 sets - 10 reps  Standing Hip Abduction with Counter Support - 1 x daily - 7 x weekly - 2 sets - 10 reps  Standing Hip Extension with Counter Support - 1 x daily - 7 x weekly - 2 sets - 10 reps  Clamshell with Resistance - 1 x daily - 7 x weekly - 2 sets - 10 reps     Comprehension of Education:  [x] Verbalizes understanding. [] Demonstrates understanding. [] Needs review. [] Demonstrates/verbalizes HEP/Ed previously given. Plan: [x] Continue current frequency toward long and short term goals.     [] Specific Instructions for subsequent treatments:       Time In:   4:05        Time Out: 4:45    Electronically signed by:  April Dupree PTA

## 2022-06-03 DIAGNOSIS — K21.9 GASTROESOPHAGEAL REFLUX DISEASE WITHOUT ESOPHAGITIS: ICD-10-CM

## 2022-06-04 RX ORDER — OMEPRAZOLE 40 MG/1
CAPSULE, DELAYED RELEASE ORAL
Qty: 90 CAPSULE | Refills: 3 | Status: SHIPPED | OUTPATIENT
Start: 2022-06-04 | End: 2022-08-04 | Stop reason: SDUPTHER

## 2022-06-06 ENCOUNTER — HOSPITAL ENCOUNTER (OUTPATIENT)
Dept: PHYSICAL THERAPY | Facility: CLINIC | Age: 77
Setting detail: THERAPIES SERIES
Discharge: HOME | End: 2022-06-06
Payer: MEDICARE

## 2022-06-06 NOTE — FLOWSHEET NOTE
[] [address]  [address]    [phone]  F: [phone]   [x] [email or web address]  [facility]   [address]  P: [phone]  F: [phone]  [] [address]  [address] [facility]  P: [phone]  F: [phone] [] [address]  P: [phone]  F: [phone]  [] [address]  [facility]   [address]   [place]: [phone]  F: [phone]   [] [address]  [place]: [phone]   F: [phone]     Physical Therapy Cancel/No Show note    Date: 2022  Patient: Ed Corona  : 1945  MRN: [id number]    Cancels/No Shows to date:     For today's appointment patient:    [x]  Cancelled    [] Rescheduled appointment    [] No-show     Reason given by patient:    []  Patient ill    []  Conflicting appointment    [] No transportation      [] Conflict with work    [] No reason given    [] Weather related    [] COVID-19    [x] Other:      Comments: Pt's grandson having a heart cath        [x] Next appointment was confirmed    Electronically signed by: Montana Coffman PTA

## 2022-06-30 ENCOUNTER — HOSPITAL ENCOUNTER (OUTPATIENT)
Dept: PHYSICAL THERAPY | Facility: CLINIC | Age: 77
Setting detail: THERAPIES SERIES
Discharge: HOME | End: 2022-06-30
Payer: MEDICARE

## 2022-06-30 PROCEDURE — 97110 THERAPEUTIC EXERCISES: CPT

## 2022-06-30 NOTE — FLOWSHEET NOTE
[] [address]  [address]  [phone]  F: [phone] [x] [address]  [facility]   [address]  P: [phone]  F: [phone] [] [facility]  [address]  P: [phone]  F: [phone] [] [address]  P: [phone]  F: [phone] [] [address]  [facility]   [address]   [place]: [phone]  F: [phone]      Physical Therapy Daily Treatment Note    Date:  2022  Patient Name:  Munir López    :  1945  MRN: [id number]  Physician: CRISTINA García - CNP                        Insurance: MEDICARE (No Copay)/[facility]   Medical Diagnosis: M47.816 (ICD-10-CM) - Lumbar spondylosis  Rehab Codes: M54.59, M25.651, M25.652, R29.3, M62.81  Onset Date: 2020                    Next 's appt.: 22  Visit# / total visits: 3/12; Progress note for Medicare patient due at visit 10     Cancels/No Shows:     Subjective:    Pain:  [] Yes  [x] No Location: LBP (right)  Pain Rating: (0-10 scale) 5/10  Pain altered Tx:  [] No  [x] Yes  Action: Pt deferred standing exercises  Comments: Pt states his back pain feels like a kidney stone.     Objective:  Modalities:   Precautions:  Exercises:  Exercise BILAT Reps/ Time Weight/ Level Comments             Seated          Lumbar flexion stretch  10x       Abdominal bracing  10x       Trunk rotation  10x Ball  Arms extended holding ball    Side bend stretch  5x5\"     Trunk extension over bolster  10x5\"           Supine       Hs stretch B 3x15\" Strap     L hip flexor stretch  1'     PPT 10x     SLR 10x     Single knee fallout + TA 10x     Marches + TA 10x           Side lying       Hip abd  10x2  Added second set          Standing       STG: (to be met in 6 treatments)  1. Pt will reduce pain to less than or equal to 4/10 with ADLs  2. Pt will be able to achieve at least 0-30° B 90-90 SLR to demo improved B hamstrings flexibility to ease difficulty with daily ambulation   3. Pt will improve LLE strength to at least 4/5 globally to reduce difficulty with caregiver-related tasks   4. Pt will be able to maintain B tandem stance for at least 10sec with minimal to no sway to demo improved BLE stability   5. Pt will improve B lumbar SB ROM to less than 50% limited to demo reduced lumbar paraspinals guarding to participate in daily functional activities   6. Pt will improve L hip flexors/quad flexibility as evident by achieving at least 90° L Ely's to ease difficulty with stairs negotiation   7. Patient to be independent with home exercise program as demonstrated by performance with correct form without cues.     LTG: (to be met in 12 treatments)  1. Pt will improve Oswestry score from 46% impaired to less than 36% impaired to demo improved functional mobility to participate in daily functional activities  2. Pt will be able to maintain B SLS for at least 10sec to demo improved BLE stability   3. Pt will be able to complete 5xSTS without BUE support in less than 20sec to demo improved functional strength of BLE         Patient goals: \"to doing things I want\"     Pt. Education:  [] Yes  [x] No  [] Reviewed Prior HEP/Ed  Method of Education: [] Verbal  [] Demo ex's  [] Written    Access Code: [email or web address]  URL: [email or web address]. com/  Date: 05/27/2022  Prepared by:  Shakira Amos     Exercises  Supine Lower Trunk Rotation - 1 x daily - 7 x weekly - 1 sets - 10 reps - 5s hold  Gastroc Stretch on Wall - 1 x daily - 7 x weekly - 1 sets - 3 reps - 20s hold  Standing March with Counter Support - 1 x daily - 7 x weekly - 2 sets - 10 reps  Standing Hip Flexion with Counter Support - 1 x daily - 7 x weekly - 2 sets - 10 reps  Standing Hip Abduction with Counter Support - 1 x daily - 7 x weekly - 2 sets - 10 reps  Standing Hip Extension with Counter Support - 1 x daily - 7 x weekly - 2 sets - 10 reps  Clamshell with Resistance - 1 x daily - 7 x weekly - 2 sets - 10 reps     Comprehension of Education:  [] Verbalizes understanding. [] Demonstrates understanding. [x] Needs review. [] Demonstrates/verbalizes HEP/Ed previously given. Plan: [x] Continue current frequency toward long and short term goals.     [x] Specific Instructions for subsequent treatments: Resume standing exercises     Time In:   4:59 pm        Time Out: 5:53    Electronically signed by:  Danielle Drew PTA

## 2022-07-07 ENCOUNTER — HOSPITAL ENCOUNTER (OUTPATIENT)
Dept: PHYSICAL THERAPY | Facility: CLINIC | Age: 77
Setting detail: THERAPIES SERIES
Discharge: HOME | End: 2022-07-07
Payer: MEDICARE

## 2022-07-07 PROCEDURE — 97110 THERAPEUTIC EXERCISES: CPT

## 2022-07-07 NOTE — FLOWSHEET NOTE
[] [address]  [address]  [phone]  F: [phone] [x] [address]  [facility]   [address]  P: [phone]  F: [phone] [] [address]  [address]  P: [phone]  F: [phone] [] [address]  P: [phone]  F: [phone] [] [address]  [facility]   [address]   [place]: [phone]  F: [phone]      Physical Therapy Daily Treatment Note    Date:  2022  Patient Name:  Alfonso Montgomery    :  1945  MRN: [id number]  Physician: CRISTINA García - CNP                        Insurance: MEDICARE (No Copay)/[facility]   Medical Diagnosis: M47.816 (ICD-10-CM) - Lumbar spondylosis  Rehab Codes: M54.59, M25.651, M25.652, R29.3, M62.81  Onset Date: 2020                    Next 's appt.: 22  Visit# / total visits: ; Progress note for Medicare patient due at visit 10     Cancels/No Shows:     Subjective:    Pain:  [] Yes  [x] No Location: LBP (right)  Pain Rating: (0-10 scale) 0/10  Pain altered Tx:  [] No  [x] Yes  Action:   Pt arrives stating he is very tired however, is not experiencing pain.      Objective:  Modalities:   Precautions:  Exercises:  Exercise BILAT Reps/ Time Weight/ Level Comments             Seated          Lumbar flexion stretch  10x       Abdominal bracing  10x       Trunk rotation  10x Ball  Arms extended holding ball    Side bend stretch  5x5\"     Trunk extension over bolster  10x5\"           Supine       Hs stretch B 3x15\" Strap     L hip flexor stretch  1'     PPT 10x     SLR 10x     Single knee fallout + TA 10x     Marches + TA 10x           Side lying       Hip abd  10x2  Added second set 630         Standing          gastroc stretch on SB  3x30\"      Step ups  10xea  4\"  Added 7/7   Marching  10x        Heel raises  10x        3 ways hip 10x ea        Hs curls  10x     NBOS on foam eyes open 2x30\"     NBOS on foam eyes closed 2x30\"     Tandem stance  2x20\"ea           Other:      Specific Instructions for next treatment:  - Initiate core strength  - Progress BLE strength and flexibility of B hip flexors/quad L>R and B hamstrings   - Standing balance training on various surfaces  - CP/HP as needed pre-/post-treatment to manage pain and muscle guarding             Treatment Charges: Mins Units   []  Modalities:HP     [x]  Ther Exercise 35 2   []  Manual Therapy     []  Ther Activities     []  Aquatics     []  Vasocompression     []  Other     Total Treatment time 35 2       Assessment: [x] Progressing toward goals. Initiated treatment with supine strengthening exercises with good tolerance. Able to resume standing exercises with improved tolerance. Cueing for upright posture and core engament is required during 3 way hip with good carry over. Added step ups to progress LE strength with muscular fatigue noted. Pt denies any increase of pain post session. [] No change. [] Other:  [x] Patient would continue to benefit from skilled physical therapy services in order to: manage pain, improve lumbar mobility particularly with B SB ROM, improve flexibility of L hip flexors/quad and B hamstrings, improve core stabilizers and BLE strength L>R, and improve standing balance to promote BLE controlled stability to prevent fall risk and assist pt in returning to prior level of function. STG/LTG  Problems:    [x]? ? Pain: 3-10/10 low back   [x]? ? ROM: lumbar B SB, decrease L hip flexors/quad and B HS flexibility   [x]? ? Strength: BLE strength L>R   [x]? ? Function: Oswestry 46% impaired   [x]? Other: impaired posture            STG: (to be met in 6 treatments)  1. Pt will reduce pain to less than or equal to 4/10 with ADLs  2.  Pt will be able to achieve at least 0-30° B 90-90 SLR to demo improved B hamstrings flexibility to ease difficulty with daily ambulation   3. Pt will improve LLE strength to at least 4/5 globally to reduce difficulty with caregiver-related tasks   4. Pt will be able to maintain B tandem stance for at least 10sec with minimal to no sway to demo improved BLE stability   5. Pt will improve B lumbar SB ROM to less than 50% limited to demo reduced lumbar paraspinals guarding to participate in daily functional activities   6. Pt will improve L hip flexors/quad flexibility as evident by achieving at least 90° L Ely's to ease difficulty with stairs negotiation   7. Patient to be independent with home exercise program as demonstrated by performance with correct form without cues.     LTG: (to be met in 12 treatments)  1. Pt will improve Oswestry score from 46% impaired to less than 36% impaired to demo improved functional mobility to participate in daily functional activities  2. Pt will be able to maintain B SLS for at least 10sec to demo improved BLE stability   3. Pt will be able to complete 5xSTS without BUE support in less than 20sec to demo improved functional strength of BLE         Patient goals: \"to doing things I want\"     Pt. Education:  [x] Yes  [x] No  [x] Reviewed Prior HEP/Ed  Method of Education: [x] Verbal  [x] Demo  [] Written    Access Code: [email or web address]  URL: [email or web address] [email or web address]  Date: 05/27/2022  Prepared by:  Shakira Valiente     Exercises  Supine Lower Trunk Rotation - 1 x daily - 7 x weekly - 1 sets - 10 reps - 5s hold  Gastroc Stretch on Wall - 1 x daily - 7 x weekly - 1 sets - 3 reps - 20s hold  Standing March with Counter Support - 1 x daily - 7 x weekly - 2 sets - 10 reps  Standing Hip Flexion with Counter Support - 1 x daily - 7 x weekly - 2 sets - 10 reps  Standing Hip Abduction with Counter Support - 1 x daily - 7 x weekly - 2 sets - 10 reps  Standing Hip Extension with Counter Support - 1 x daily - 7 x weekly - 2 sets - 10 reps  Clamshell with Resistance - 1 x daily - 7 x weekly - 2 sets - 10 reps     Comprehension of Education:  [] Verbalizes understanding. [] Demonstrates understanding. [x] Needs review. -upright posture   [] Demonstrates/verbalizes HEP/Ed previously given. Plan: [x] Continue current frequency toward long and short term goals.     [] Specific Instructions for subsequent treatments:     Time In:   4:00 pm        Time Out: 4:35 pm    Electronically signed by:  Dalia Heath PTA

## 2022-07-11 ENCOUNTER — HOSPITAL ENCOUNTER (OUTPATIENT)
Dept: PHYSICAL THERAPY | Facility: CLINIC | Age: 77
Setting detail: THERAPIES SERIES
Discharge: HOME | End: 2022-07-11
Payer: MEDICARE

## 2022-07-11 PROCEDURE — 97112 NEUROMUSCULAR REEDUCATION: CPT

## 2022-07-11 PROCEDURE — 97110 THERAPEUTIC EXERCISES: CPT

## 2022-07-11 NOTE — FLOWSHEET NOTE
[] [address]  [address]  [phone]  F: [phone] [x] [address]  [place] [address]   [address]  P: [phone]  F: [phone] [] [address]  [address]  P: [phone]  F: [phone] [] [address]  P: [phone]  F: [phone] [] [address]  [facility]   [address]   [place]: [phone]  F: [phone]      Physical Therapy Daily Treatment Note    Date:  2022  Patient Name:  Wanda Clark    :  1945  MRN: [id number]  Physician: CRISTINA García - CNP                        Insurance: MEDICARE (No Copay)/[facility]   Medical Diagnosis: M47.816 (ICD-10-CM) - Lumbar spondylosis  Rehab Codes: M54.59, M25.651, M25.652, R29.3, M62.81  Onset Date: 2020                    Next 's appt.: 22  Visit# / total visits: ; Progress note for Medicare patient due at visit 10     Cancels/No Shows:     Subjective:    Pain:  [] Yes  [x] No Location: LBP (right)  Pain Rating: (0-10 scale) 5/10  Pain altered Tx:  [] No  [x] Yes  Action:      Pt reports feeling tired as he is trying to sell a house and move at this time. Some soreness upon entry.       Objective:  Modalities:   Precautions:  Exercises:  Exercise BILAT Reps/ Time Weight/ Level Comments             Seated          Lumbar flexion stretch  10x       Abdominal bracing  10x       Trunk rotation  10x Ball  Arms extended holding ball    Side bend stretch  5x5\"     Trunk extension over bolster  10x5\"           Supine       Hs stretch B 3x15\" Strap     L hip flexor stretch  1'     PPT 10x 5\"    SLR 10x2 A Increased reps    Single knee fallout + TA 10x     Marches + TA 10x2  Increased reps          Side lying Hip abd  10x2  Added second set 6/30         Standing          gastroc stretch on SB  3x30\"      Step ups  15xea  6\" Progressed 7/11   Marching  15x    Increased reps 7/11   Heel raises  15x    Increased reps 7/11   3 ways hip 15x ea    Increased reps 7/11   Hs curls       NBOS on foam eyes open 2x30\"     NBOS on foam eyes closed 2x30\"     Tandem stance  2x20\"ea           Other:      Specific Instructions for next treatment:  - Initiate core strength  - Progress BLE strength and flexibility of B hip flexors/quad L>R and B hamstrings   - Standing balance training on various surfaces  - CP/HP as needed pre-/post-treatment to manage pain and muscle guarding             Treatment Charges: Mins Units   []  Modalities:HP     [x]  Ther Exercise 50 3   []  Manual Therapy     []  Ther Activities     []  Aquatics     []  Vasocompression     [x]  Other- neuro 5 1   Total Treatment time 55 4       Assessment: [x] Progressing toward goals. Continued with exercises as charted above. Added sets for increased reps with some muscular fatigue noted but no increased pain or complaints with higher reps. Pt does require moderate cuing for proper technique during exercise routine. [] No change. [] Other:  [x] Patient would continue to benefit from skilled physical therapy services in order to: manage pain, improve lumbar mobility particularly with B SB ROM, improve flexibility of L hip flexors/quad and B hamstrings, improve core stabilizers and BLE strength L>R, and improve standing balance to promote BLE controlled stability to prevent fall risk and assist pt in returning to prior level of function. STG/LTG  Problems:    [x]? ? Pain: 3-10/10 low back   [x]? ? ROM: lumbar B SB, decrease L hip flexors/quad and B HS flexibility   [x]? ? Strength: BLE strength L>R   [x]? ? Function: Oswestry 46% impaired   [x]? Other: impaired posture            STG: (to be met in 6 treatments)  1.  Pt will reduce pain to less than or equal to 4/10 with ADLs  2. Pt will be able to achieve at least 0-30° B 90-90 SLR to demo improved B hamstrings flexibility to ease difficulty with daily ambulation   3. Pt will improve LLE strength to at least 4/5 globally to reduce difficulty with caregiver-related tasks   4. Pt will be able to maintain B tandem stance for at least 10sec with minimal to no sway to demo improved BLE stability   5. Pt will improve B lumbar SB ROM to less than 50% limited to demo reduced lumbar paraspinals guarding to participate in daily functional activities   6. Pt will improve L hip flexors/quad flexibility as evident by achieving at least 90° L Ely's to ease difficulty with stairs negotiation   7. Patient to be independent with home exercise program as demonstrated by performance with correct form without cues.     LTG: (to be met in 12 treatments)  1. Pt will improve Oswestry score from 46% impaired to less than 36% impaired to demo improved functional mobility to participate in daily functional activities  2. Pt will be able to maintain B SLS for at least 10sec to demo improved BLE stability   3. Pt will be able to complete 5xSTS without BUE support in less than 20sec to demo improved functional strength of BLE         Patient goals: \"to doing things I want\"     Pt. Education:  [x] Yes  [x] No  [] Reviewed Prior HEP/Ed  Method of Education: [] Verbal  [] Demo  [] Written    Access Code: [email or web address]  URL: [email or web address]  Date: 05/27/2022  Prepared by:  Shakira Germain     Exercises  Supine Lower Trunk Rotation - 1 x daily - 7 x weekly - 1 sets - 10 reps - 5s hold  Gastroc Stretch on Wall - 1 x daily - 7 x weekly - 1 sets - 3 reps - 20s hold  Standing March with Counter Support - 1 x daily - 7 x weekly - 2 sets - 10 reps  Standing Hip Flexion with Counter Support - 1 x daily - 7 x weekly - 2 sets - 10 reps  Standing Hip Abduction with Counter Support - 1 x daily - 7 x weekly - 2 sets - 10 reps  Standing Hip Extension with Counter Support - 1 x daily - 7 x weekly - 2 sets - 10 reps  Clamshell with Resistance - 1 x daily - 7 x weekly - 2 sets - 10 reps     Comprehension of Education:  [] Verbalizes understanding. [] Demonstrates understanding. [] Needs review. [x] Demonstrates/verbalizes HEP/Ed previously given. Good carryover of exercises given cues. Plan: [x] Continue current frequency toward long and short term goals.     [] Specific Instructions for subsequent treatments:       Time In: 3:07 pm        Time Out: 4:02pm    Electronically signed by:  Brennen Boyle PTA

## 2022-07-13 ENCOUNTER — HOSPITAL ENCOUNTER (OUTPATIENT)
Dept: PHYSICAL THERAPY | Facility: CLINIC | Age: 77
Setting detail: THERAPIES SERIES
Discharge: HOME | End: 2022-07-13
Payer: MEDICARE

## 2022-07-13 PROCEDURE — 97112 NEUROMUSCULAR REEDUCATION: CPT

## 2022-07-13 PROCEDURE — 97110 THERAPEUTIC EXERCISES: CPT

## 2022-07-13 NOTE — FLOWSHEET NOTE
[] [facility] [address] [facility]  [address]  [phone]  F: [phone] [x] [address]  [facility]   [address]  P: [phone]  F: [phone] [] [address]  [address] [facility] [address]  P: [phone]  F: [phone] [] [address]  P: [phone]  F: [phone] [] [address]  [facility]   [address]   [place]: [phone]  F: [phone]      Physical Therapy Daily Treatment Note    Date:  2022  Patient Name:  Alcides Menon    :  1945  MRN: [id number]  Physician: CRISTINA García CNP                        Insurance: MEDICARE (No Copay)/[facility]   Medical Diagnosis: M47.816 (ICD-10-CM) - Lumbar spondylosis  Rehab Codes: M54.59, M25.651, M25.652, R29.3, M62.81  Onset Date: 2020                    Next 's appt.: 22  Visit# / total visits: ; Progress note for Medicare patient due at visit 10     Cancels/No Shows:     Subjective:    Pain:  [x] Yes  [] No Location: LBP   Pain Rating: (0-10 scale) 3/10  Pain altered Tx:  [x] No  [] Yes  Action:    Comment: Pt arrived to physical therapy with c/o pain in low back rated 3/10 and \"tingly\" feeling in B thigh. Pt reported woke up last night due to increased cramp in B hamstrings. Pt reported did seated HS stretch which able to reduce symptoms. Pt reported has been tired lately due to having to take care of grandson and in the process of selling house/buying a mobile home to move back to [place].      Objective:  Modalities:   Precautions:  Exercises: bold = completed   Exercise BILAT Reps/ Time Weight/ Level Comments             Seated          Lumbar flexion stretch  10x3s       Abdominal bracing  10x       Trunk rotation  10x [place] Arms extended holding ball    Side bend stretch  5x5\"     Trunk extension over bolster  10x5\"           Supine       Hs stretch B 3x20s  [address] hold time 7/13   L hip flexor stretch  1'     PPT 10x 5\"    SLR 10x2 1# Added wt 7/13   Single knee fallout + TA 10x     Marches + TA 10x2  Increased reps 7/11   Ball press  10x5s  New 7/13   90/90 B leg lift  10x3s  New 7/13         Sidelying       Hip abd  10x2 1# Added wt 7/13         Standing          gastroc stretch on SB  3x30\"      Step ups  15xea  6\" Progressed 7/11   Marching on foam  2x10   Added foam 7/13   Heel raises on foam  20x   Inc reps & added foam 7/13   3 ways hip 15x ea    Increased reps 7/11   Hs curls       NBOS on foam eyes open 2x30\"     NBOS on foam eyes closed 2x30\"     Tandem stance  2x20\"ea  Min to no sway noted 7/13  Progress next visit          Step over orange yaakov 4x fwd  2x lat  New 7/13   Other:      Specific Instructions for next treatment:  - Initiate core strength  - Progress BLE strength and flexibility of B hip flexors/quad L>R and B hamstrings   - Standing balance training on various surfaces  - CP/HP as needed pre-/post-treatment to manage pain and muscle guarding             Treatment Charges: Mins Units   []  Modalities:HP     [x]  Ther Exercise 33 2   []  Manual Therapy     []  Ther Activities     []  Aquatics     []  Vasocompression     [x]  Other- neuro 25 2   Total Treatment time 54 4      Total Medicare Cost (7/13/2022): $816.10 (combined visits between 3/23/22-4/11/22)    Assessment: [x] Progressing toward goals. Initiated therapy session with mat exs focus on improving flexibility of BLE musculatures. Progressed B hamstrings stretch and B SLR with increased difficulty due to B hip flexors weakness L>R. Added foam into marching and heel raises to challenge standing balance and stability with verbal cues and tactile cues with therapist's hand on chest and low back to promote upright posture and increased high knee. Added step over yaakov with gait belt and CGA provided throughout to maximize safety and cues provided to encourage big step and upright posture to reduce shuffling gait pattern and forward trunk lean gait pattern. Increased set with nBOS standing balance eyes close to challenge ankle/knee/hip strategies while maintaining COM in midline. Pt demo improved stability after cues provided to maintain core stability and stay in upright posture. [] No change. [] Other:  [x] Patient would continue to benefit from skilled physical therapy services in order to: manage pain, improve lumbar mobility particularly with B SB ROM, improve flexibility of L hip flexors/quad and B hamstrings, improve core stabilizers and BLE strength L>R, and improve standing balance to promote BLE controlled stability to prevent fall risk and assist pt in returning to prior level of function. STG/LTG  Problems:    [x]? ? Pain: 3-10/10 low back   [x]? ? ROM: lumbar B SB, decrease L hip flexors/quad and B HS flexibility   [x]? ? Strength: BLE strength L>R   [x]? ? Function: Oswestry 46% impaired   [x]? Other: impaired posture            STG: (to be met in 6 treatments)  1. Pt will reduce pain to less than or equal to 4/10 with ADLs  2. Pt will be able to achieve at least 0-30° B 90-90 SLR to demo improved B hamstrings flexibility to ease difficulty with daily ambulation   3. Pt will improve LLE strength to at least 4/5 globally to reduce difficulty with caregiver-related tasks   4. Pt will be able to maintain B tandem stance for at least 10sec with minimal to no sway to demo improved BLE stability   5. Pt will improve B lumbar SB ROM to less than 50% limited to demo reduced lumbar paraspinals guarding to participate in daily functional activities   6. Pt will improve L hip flexors/quad flexibility as evident by achieving at least 90° L Ely's to ease difficulty with stairs negotiation   7.  Patient to be independent with home exercise program as demonstrated by performance with correct form without cues.     LTG: (to be met in 12 treatments)  1. Pt will improve Oswestry score from 46% impaired to less than 36% impaired to demo improved functional mobility to participate in daily functional activities  2. Pt will be able to maintain B SLS for at least 10sec to demo improved BLE stability   3. Pt will be able to complete 5xSTS without BUE support in less than 20sec to demo improved functional strength of BLE         Patient goals: \"to doing things I want\"     Pt. Education:  [x] Yes  [x] No  [] Reviewed Prior HEP/Ed  Method of Education: [] Verbal  [] Demo  [] Written    Access Code: [email or web address]  URL: [email or web address] [email or web address]  Date: 05/27/2022  Prepared by: Shakira Cao     Exercises  Supine Lower Trunk Rotation - 1 x daily - 7 x weekly - 1 sets - 10 reps - 5s hold  Gastroc Stretch on Wall - 1 x daily - 7 x weekly - 1 sets - 3 reps - 20s hold  Standing March with Counter Support - 1 x daily - 7 x weekly - 2 sets - 10 reps  Standing Hip Flexion with Counter Support - 1 x daily - 7 x weekly - 2 sets - 10 reps  Standing Hip Abduction with Counter Support - 1 x daily - 7 x weekly - 2 sets - 10 reps  Standing Hip Extension with Counter Support - 1 x daily - 7 x weekly - 2 sets - 10 reps  Clamshell with Resistance - 1 x daily - 7 x weekly - 2 sets - 10 reps     Comprehension of Education:  [] Verbalizes understanding. [] Demonstrates understanding. [] Needs review. [x] Demonstrates/verbalizes HEP/Ed previously given. Good carryover of exercises given cues. Plan: [x] Continue current frequency toward long and short term goals. [] Specific Instructions for subsequent treatments:       Time In: 3:00 pm          Time Out: 3:57 pm    Electronically signed by:   Shakira Cao, PT

## 2022-07-18 ENCOUNTER — HOSPITAL ENCOUNTER (OUTPATIENT)
Dept: PHYSICAL THERAPY | Facility: CLINIC | Age: 77
Setting detail: THERAPIES SERIES
Discharge: HOME | End: 2022-07-18
Payer: MEDICARE

## 2022-07-18 NOTE — FLOWSHEET NOTE
[] [address]  [address]    [phone]  F: [phone]   [x] [email or web address]  [facility] 36   [address]  P: [phone]  F: [phone]  [] [address]  [address]  P: [phone]  F: [phone] [] [address]  P: [phone]  F: [phone]  [] [address]  [address] [facility] [address]   [address]   [place]: [phone]  F: [phone]   [] [address]  [place]: [phone]   F: [phone]     Physical Therapy Cancel/No Show note    Date: 2022  Patient: Heather Meals  : 1945  MRN: [id number]    Cancels/No Shows to date:     For today's appointment patient:    [x]  Cancelled    [] Rescheduled appointment    [] No-show     Reason given by patient:    []  Patient ill    [x]  Conflicting appointment     [] No transportation      [] Conflict with work    [] No reason given    [] Weather related    [] COVID-19    [] Other:      Comments:        [x] Next appointment was confirmed    Electronically signed by:  An Carlyon Schwab, PT

## 2022-07-20 ENCOUNTER — HOSPITAL ENCOUNTER (OUTPATIENT)
Dept: PHYSICAL THERAPY | Facility: CLINIC | Age: 77
Setting detail: THERAPIES SERIES
Discharge: HOME | End: 2022-07-20
Payer: MEDICARE

## 2022-07-20 PROCEDURE — 97110 THERAPEUTIC EXERCISES: CPT

## 2022-07-20 PROCEDURE — 97112 NEUROMUSCULAR REEDUCATION: CPT

## 2022-07-20 NOTE — FLOWSHEET NOTE
[] [address]  [address]  [phone]  F: [phone] [x] [address]  [facility] 36   [address]  P: [phone]  F: [phone] [] [place] [facility]  [address] [facility]  P: [phone]  F: [phone] [] [address]  P: [phone]  F: [phone] [] [address]  [facility]   [address]   [place]: [phone]  F: [phone]      Physical Therapy Daily Treatment Note    Date:  2022  Patient Name:  Mary Gill    :  1945  MRN: [id number]  Physician: CRISTINA Santamaria CNP                        Insurance: MEDICARE (No Copay)/[facility]   Medical Diagnosis: M47.816 (ICD-10-CM) - Lumbar spondylosis  Rehab Codes: M54.59, M25.651, M25.652, R29.3, M62.81  Onset Date: 2020                    Next 's appt.: 22  Visit# / total visits: ; Progress note for Medicare patient due at visit 10     Cancels/No Shows:     Subjective:    Pain:  [x] Yes  [] No Location: LBP   Pain Rating: (0-10 scale) 3/10  Pain altered Tx:  [x] No  [] Yes  Action:    Comment: Pt arrived to physical therapy with c/o pain in low back stating \"not that bad today\" rated 3/10. Pt reported was not able to complete HEP today as pt have been very busy. Pt reported physical therapy has been helping with reducing back pain however cont to be unsteady and \"wobbly\" when walking. Pt noted was able to climb ladder more careful and with more confident, noted has also been doing more yard work without noticing an increased in pain level.        Objective:  Modalities:   Precautions:  Exercises: bold = completed   Exercise BILAT Reps/ Time Weight/ Level Comments   Sci[employer] NuStep  6 mins Lvl 3           Seated Lumbar flexion stretch  10x3s       Abdominal bracing  10x       Trunk rotation  10x Ball  Arms extended holding ball    Side bend stretch  5x5\"     Trunk extension over bolster  10x5\"           Supine       Hs stretch B 3x30s  Strap  Inc hold time 7/20   L hip flexor stretch  1'     PPT 10x 5\"    SLR 10x2 1# Added wt 7/13   Single knee fallout + TA 10x     Marches + TA 10x2 1# Added wt 7/20   Ball press  10x5s  New 7/13 90/90 B leg lift  10x3s  New 7/13         Sidelying       Hip abd  10x2 1# Added wt 7/13         Prone      B hip flexors stretch 1' ea  New 7/20         Standing          gastroc stretch on SB  3x30\"      Step ups  15xea  6\" Progressed 7/11   Marching on foam  2x10   Added foam 7/13   Heel raises on foam  20x   Inc reps & added foam 7/13   3 ways hip 15x ea    Increased reps 7/11   Hs curls       NBOS on foam eyes open 2x30\"     NBOS on foam eyes closed 2x30\"     Tandem stance + foam 2x20\"ea  Added foam 7/20          Step over orange yaakov 4x fwd  2x lat  New 7/13   Other:      Specific Instructions for next treatment:  - Initiate core strength  - Progress BLE strength and flexibility of B hip flexors/quad L>R and B hamstrings   - Standing balance training on various surfaces  - CP/HP as needed pre-/post-treatment to manage pain and muscle guarding             Treatment Charges: Mins Units   []  Modalities:HP     [x]  Ther Exercise 30 2   []  Manual Therapy     []  Ther Activities     []  Aquatics     []  Vasocompression     [x]  Other- neuro 10 1   Total Treatment time 40 3   Re-assessed on 7/20/22 by An Edwar Thompson, PT    Total Medicare Cost (7/20/2022): $893.69 (combined visits between 3/23/22-4/11/22)      Objective: Re-assessed on 7/20/22 by An Edwar Thompson, PT  90/90 SLR: R 25, L 28  Tandem Stance (eyes open): R, L 30sec      STRENGTH     Left Right   L1-2 Hip Flex 4/5 4+/5   Hip Abd 4+/5 4+/5   Knee Flex 4/5 4+/5   L3-4 Knee Ext 4+/5 4+/5           L4 Ankle DF 5/5 5/5   S1 Plant.  Flex 5/5 5/5 4/5 globally to reduce difficulty with caregiver-related tasks - MET   Pt will be able to maintain B tandem stance for at least 10sec with minimal to no sway to demo improved BLE stability -   Pt will improve B lumbar SB ROM to less than 50% limited to demo reduced lumbar paraspinals guarding to participate in daily functional activities - Not Met (50% limited B SB)   Pt will improve L hip flexors/quad flexibility as evident by achieving at least 90° L Ely's to ease difficulty with stairs negotiation - MET   Patient to be independent with home exercise program as demonstrated by performance with correct form without cues. - MET      LTG: (to be met in 12 treatments)   Pt will improve Oswestry score from 46% impaired to less than 36% impaired to demo improved functional mobility to participate in daily functional activities  Pt will be able to maintain B SLS for at least 10sec to demo improved BLE stability   Pt will be able to complete 5xSTS without BUE support in less than 20sec to demo improved functional strength of BLE         Patient goals: \"to doing things I want\"     Pt. Education:  [x] Yes  [x] No  [] Reviewed Prior HEP/Ed  Method of Education: [] Verbal  [] Demo  [] Written    Access Code: [email or web address]  URL: [email or web address]  Date: 05/27/2022  Prepared by:  Shakira Georges Patient     Exercises  Supine Lower Trunk Rotation - 1 x daily - 7 x weekly - 1 sets - 10 reps - 5s hold  Gastroc Stretch on Wall - 1 x daily - 7 x weekly - 1 sets - 3 reps - 20s hold  Standing March with Counter Support - 1 x daily - 7 x weekly - 2 sets - 10 reps  Standing Hip Flexion with Counter Support - 1 x daily - 7 x weekly - 2 sets - 10 reps  Standing Hip Abduction with Counter Support - 1 x daily - 7 x weekly - 2 sets - 10 reps  Standing Hip Extension with Counter Support - 1 x daily - 7 x weekly - 2 sets - 10 reps  Clamshell with Resistance - 1 x daily - 7 x weekly - 2 sets - 10 reps     Comprehension of Education:  [] Javieraya understanding. [] Demonstrates understanding. [] Needs review. [x] Demonstrates/verbalizes HEP/Ed previously given. Good carryover of exercises given cues. Plan: [x] Continue current frequency toward long and short term goals. [] Specific Instructions for subsequent treatments:       Time In: 4:57 pm          Time Out: 4:43 pm    Electronically signed by:   Shakira Georges Patient, PT

## 2022-07-20 NOTE — PROGRESS NOTES
[] [facility]) - [facility] &  Therapy  [address]  [phone]  F: [phone] [x] [address]  [facility] [address]   [address]  P: [phone]  F: [phone] [] [address] &  Therapy  [address]  P: [phone]  F: [phone] [] [address]  P: [phone]  F: [phone] [] [address]  [facility]   [address]   [place]: [phone]  F: [phone]      Physical Therapy Progress Note    Date: 2022      Patient: Tramaine Barajas  : 1945  MRN: [id number]    Physician: CRISTINA Gavin CNP                        Insurance: MEDICARE (No Copay)/[facility]   Medical Diagnosis: M47.816 (ICD-10-CM) - Lumbar spondylosis  Rehab Codes: M54.59, M25.651, M25.652, R29.3, M62.81  Onset Date: 2020                    Next 's appt.: 22  Visit# / total visits: ; Progress note for Medicare patient due at visit 10                                    Cancels/No Shows: 2  Reporting Period: 22 to 22         Subjective:    Pain:  [x] Yes  [] No   Location: LBP                          Pain Rating: (0-10 scale) 3/10  Pain altered Tx:  [x] No  [] Yes  Action:    Comment: Pt arrived to physical therapy with c/o pain in low back stating \"not that bad today\" rated 3/10. Pt reported was not able to complete HEP today as pt have been very busy. Pt reported physical therapy has been helping with reducing back pain however cont to be unsteady and \"wobbly\" when walking. Pt noted was able to climb ladder more careful and with more confident, noted has also been doing more yard work without noticing an increased in pain level.           Objective: Re-assessed on 22 by Shakira Stroud, PT   SLR: R 25, L 28  Tandem Stance (eyes open): R, L 30sec            STRENGTH     Left Right   L1-2 Hip Flex 4/5 4+/5   Hip Abd 4+/5 4+/5   Knee Flex 4/5 4+/5   L3-4 Knee Ext 4+/5 4+/5           L4 Ankle DF 5/5 5/5   S1 Plant. Flex 5/5 5/5   Abdominals poor poor   Erector Spinae poor poor           *painful        Assessment:  [x] Progressing toward goals. Initiated therapy session on SciFit NuStep for 6 mins to improve blood flow to BLE followed by stretching and core stabilizers exs on mat. Re-assessment completed to check STGs and overall function. Pt reported overall improvement with pain level at most when completing daily functional activities, noted has been able to complete more yard work and climbed a latter at home with more confident and safety awareness. Pt also demo improvement with LLE strength and mobility as well as stability. Pt able to maintain tandem stance balance with eyes open for 30sec bilaterally with minimal to no sway noted. Will cont to progress standing balance training and functional strength of BLE as well as reviewing postural education and HEP per initial POC to assist pt in returning to prior function. [] No change. [] Other:  [x] Patient would continue to benefit from skilled physical therapy services in order to: manage pain, improve lumbar mobility particularly with B SB ROM, improve flexibility of L hip flexors/quad and B hamstrings, improve core stabilizers and BLE strength L>R, and improve standing balance to promote BLE controlled stability to prevent fall risk and assist pt in returning to prior level of function. Problems:    [x] ? Pain: 3-10/10 low back   [x] ? ROM: lumbar B SB, decrease L hip flexors/quad and B HS flexibility   [x] ? Strength: BLE strength L>R  [x] ?  Function: Oswestry 46% impaired   [x] Other: impaired posture            STG: (to be met in 6 treatments) - Re-assessed on 7/20/22 by An Ventura Currie, PT  Pt will reduce pain to less than or equal to 4/10 with ADLs - MET (3/10 at most)  Pt will be able to achieve at least 0-30° B 90-90 SLR to demo improved B hamstrings flexibility to ease difficulty with daily ambulation - MET (R 25, L 28)  Pt will improve LLE strength to at least 4/5 globally to reduce difficulty with caregiver-related tasks - MET   Pt will be able to maintain B tandem stance for at least 10sec with minimal to no sway to demo improved BLE stability -   Pt will improve B lumbar SB ROM to less than 50% limited to demo reduced lumbar paraspinals guarding to participate in daily functional activities - Not Met (50% limited B SB)  Pt will improve L hip flexors/quad flexibility as evident by achieving at least 90° L Ely's to ease difficulty with stairs negotiation - MET   Patient to be independent with home exercise program as demonstrated by performance with correct form without cues.  - MET      LTG: (to be met in 12 treatments)  Pt will improve Oswestry score from 46% impaired to less than 36% impaired to demo improved functional mobility to participate in daily functional activities  Pt will be able to maintain B SLS for at least 10sec to demo improved BLE stability  Pt will be able to complete 5xSTS without BUE support in less than 20sec to demo improved functional strength of BLE         Patient goals: \"to doing things I want\"       Treatment Plan:  [x] Therapeutic Exercise  99430             [] Iontophoresis: 4 mg/mL Dexamethasone Sodium Phosphate  mAmin  57344   [] Therapeutic Activity  79307 [] Vasopneumatic cold with compression  20092               [] Gait Training    55182 [] Ultrasound        [id number]   [x] Neuromuscular Re-education  40234 [] Electrical Stimulation Unattended  72303   [x] Manual Therapy  23546 [] Electrical Stimulation Attended  07106   [x] Instruction in HEP       [] Lumbar/Cervical Traction  70661   [] Aquatic Therapy           33644 [x] Cold/hotpack     [] Massage  78596      [] Dry Needling, 1 or 2 muscles  51864   [] Biofeedback, first 15 minutes  80609  [] Biofeedback, additional 15 minutes  66556 [] Dry Needling, 3 or more muscles  20561           Patient Status:     [x] Continue per initial plan of care. Electronically signed by Shakira Damon PT on 7/20/2022 at 5:51 PM      If you have any questions or concerns, please don't hesitate to call. Thank you for your referral.    Physician Signature:________________________________Date:__________________  By signing above or cosigning this note, I have reviewed this plan of care and certify a need for medically necessary rehabilitation services.      *PLEASE SIGN ABOVE AND FAX BACK ALL PAGES*

## 2022-07-27 ENCOUNTER — HOSPITAL ENCOUNTER (OUTPATIENT)
Dept: PHYSICAL THERAPY | Facility: CLINIC | Age: 77
Setting detail: THERAPIES SERIES
Discharge: HOME | End: 2022-07-27
Payer: MEDICARE

## 2022-07-27 PROCEDURE — 97116 GAIT TRAINING THERAPY: CPT

## 2022-07-27 PROCEDURE — 97110 THERAPEUTIC EXERCISES: CPT

## 2022-07-27 NOTE — FLOWSHEET NOTE
[] [address]  [address]  [phone]  F: [phone] [x] [address]  [facility]   [address]  P: [phone]  F: [phone] [] [place] &  Therapy  [address] [facility]  P: [phone]  F: [phone] [] [address]  P: [phone]  F: [phone] [] [address]  [facility]   [address]   [place]: [phone]  F: [phone]      Physical Therapy Daily Treatment Note    Date:  2022  Patient Name:  Scott Yi    :  1945  MRN: [id number]  Physician: CRISTINA Onofre CNP                        Insurance: MEDICARE (No Copay)/[facility]   Medical Diagnosis: M47.816 (ICD-10-CM) - Lumbar spondylosis  Rehab Codes: M54.59, M25.651, M25.652, R29.3, M62.81  Onset Date: 2020                    Next 's appt.: 22  Visit# / total visits: ; Progress note for Medicare patient due at visit 10     Cancels/No Shows:     Subjective:    Pain:  [x] Yes  [] No Location: LBP   Pain Rating: (0-10 scale) 5-6/10  Pain altered Tx:  [x] No  [] Yes  Action:    Comment: Pt arrived to physical therapy with c/o pain in low back and a little bit in BLE rated 5-6/10. Pt stated \"this weekend was rough because I was busy taking care of my grandson\". Pt reported usually able to do stretching exs in the morning to reduce tightness/stiffness in BLE however has not been able to complete HEP due to have been taking grandson to doctor's appointments, noted \"I thought we weren't going to be able to make it today\".        Objective:  Modalities:   Precautions:  Exercises: bold = completed   Exercise BILAT Reps/ Time Weight/ Level Comments   SciFit NuStep  6 mins Lvl 3           Seated          Lumbar flexion stretch  10x3s       Abdominal bracing  10x       Trunk rotation  10x Ball  Arms extended holding ball    Side bend stretch  5x5\"     Trunk extension over bolster  10x5\"           Supine       Hs stretch B 3x30s  Strap  Inc hold time 7/20   L hip flexor stretch  1'     PPT 10x 5\"    SLR 10x2 1# Added wt 7/13   Single knee fallout + TA 10x     Marches + TA 10x2 1# Added wt 7/20; no wt 7/27   Ball press  10x5s  New 7/13   90/90 B leg lift  10x3s  New 7/13   LTR 10x5s  New 7/27   SKTC 3x20s  New 7/27   Bridges  2x10  New 7/27   B HS curl 10x ea  Blue  New 7/27         Sidelying       Hip abd  10x2 1# Added wt 7/13         Prone      B hip flexors stretch 1' ea  New 7/20         Standing          gastroc stretch on SB  3x30\"      Step ups  15xea  6\" Progressed 7/11 Marching on foam  2x10   Added foam 7/13   Heel raises on foam  20x   Inc reps & added foam 7/13   3 ways hip 10x   Increased reps 7/11; reduced reps 7/27   Hs curls       NBOS on foam eyes open 2x30\"     NBOS on foam eyes closed 2x30\"     Tandem stance + foam 2x20\"ea  Added foam 7/20    STS  8x  New 7/27   Step over orange yaakov 4x fwd  2x lat  New 7/13   Walk with changing speed 1 lap  New 7/27-verbal cues \"fast\" & \"slow\"   Obstacle course 4x  New 7/27-walk around 4 cones then \"walk on tight rope\" on foam    High knee march + lunges 2x  New 7/27   Other:      Specific Instructions for next treatment:  - Initiate core strength  - Progress BLE strength and flexibility of B hip flexors/quad L>R and B hamstrings   - Standing balance training on various surfaces  - CP/HP as needed pre-/post-treatment to manage pain and muscle guarding             Treatment Charges: Mins Units   []  Modalities:HP     [x]  Ther Exercise 25 2   []  Manual Therapy     []  Ther Activities     []  Aquatics     []  Vasocompression     [x]  Other- Gait 15 1   Total Treatment time 40 3       Total Medicare Cost (7/27/2022): $966.77 (combined visits between 3/23/22-4/11/22)      Objective: Re-assessed on 7/20/22 by An Edwar Thompson, PT  90/90 SLR: R 25, L 28  Tandem Stance (eyes open): R, L 30sec      STRENGTH     Left Right   L1-2 Hip Flex 4/5 4+/5   Hip Abd 4+/5 4+/5   Knee Flex 4/5 4+/5   L3-4 Knee Ext 4+/5 4+/5           L4 Ankle DF 5/5 5/5   S1 Plant. Flex 5/5 5/5   Abdominals poor poor   Erector Spinae poor poor           *painful      Assessment: [x] Progressing toward goals. Initiated therapy session on NuStep warm up followed by mat stretching and core stabilization exs to improve mobility and strength. Reduced reps of standing 3 ways hip and no weight added with TrA + marches to increase tolerance. Short rest break provided in between exs/reps/sets to reduce fatigue and SOB. Added bridges and seated B HS curl with theraband to progress B glut and B HS strength. Added various gait training exs focus on dual tasking and cues provided to increase step length to prevent shuffling gait pattern. Cues also provided throughout gait training to maintain upright posture and reduce downward gaze to decrease risk of fall. Gait belt and CGA-SBA provided throughout gait training to maximize safety. Pt stated \"you worn me out\" after completing progressive therex program.  Will monitor symptoms as able. [] No change. [] Other:   [x] Patient would continue to benefit from skilled physical therapy services in order to: manage pain, improve lumbar mobility particularly with B SB ROM, improve flexibility of L hip flexors/quad and B hamstrings, improve core stabilizers and BLE strength L>R, and improve standing balance to promote BLE controlled stability to prevent fall risk and assist pt in returning to prior level of function. Problems:    [x] ? Pain: 3-10/10 low back   [x] ? ROM: lumbar B SB, decrease L hip flexors/quad and B HS flexibility   [x] ? Strength: BLE strength L>R   [x] ?  Function: Oswestry 46% impaired   [x] Other: impaired posture            STG: (to be met in 6 treatments) - Re-assessed on 7/20/22 by Shakira Heredia, PT  Pt will reduce pain to less than or equal to 4/10 with ADLs - MET (3/10 at most)   Pt will be able to achieve at least 0-30° B 90-90 SLR to demo improved B hamstrings flexibility to ease difficulty with daily ambulation - MET (R 25, L 28)  Pt will improve LLE strength to at least 4/5 globally to reduce difficulty with caregiver-related tasks - MET   Pt will be able to maintain B tandem stance for at least 10sec with minimal to no sway to demo improved BLE stability - MET  Pt will improve B lumbar SB ROM to less than 50% limited to demo reduced lumbar paraspinals guarding to participate in daily functional activities - Not Met (50% limited B SB)   Pt will improve L hip flexors/quad flexibility as evident by achieving at least 90° L Ely's to ease difficulty with stairs negotiation - MET   Patient to be independent with home exercise program as demonstrated by performance with correct form without cues. - MET      LTG: (to be met in 12 treatments)   Pt will improve Oswestry score from 46% impaired to less than 36% impaired to demo improved functional mobility to participate in daily functional activities  Pt will be able to maintain B SLS for at least 10sec to demo improved BLE stability   Pt will be able to complete 5xSTS without BUE support in less than 20sec to demo improved functional strength of BLE         Patient goals: \"to doing things I want\"     Pt. Education:  [x] Yes  [x] No  [] Reviewed Prior HEP/Ed  Method of Education: [] Verbal  [] Demo  [] Written    Access Code: [email or web address]  URL: [email or web address]  Date: 05/27/2022  Prepared by:  Shakira Heredia     Exercises  Supine Lower Trunk Rotation - 1 x daily - 7 x weekly - 1 sets - 10 reps - 5s hold  Gastroc Stretch on Wall - 1 x daily - 7 x weekly - 1 sets - 3 reps - 20s hold  Standing March with Counter Support - 1 x daily - 7 x weekly - 2 sets - 10 reps  Standing Hip Flexion with Counter Support - 1 x daily - 7 x weekly - 2 sets - 10 reps  Standing Hip Abduction with Counter Support - 1 x daily - 7 x weekly - 2 sets - 10 reps  Standing Hip Extension with Counter Support - 1 x daily - 7 x weekly - 2 sets - 10 reps  Clamshell with Resistance - 1 x daily - 7 x weekly - 2 sets - 10 reps     Comprehension of Education:  [] Verbalizes understanding. [] Demonstrates understanding. [] Needs review. [x] Demonstrates/verbalizes HEP/Ed previously given. Good carryover of exercises given cues. Plan: [x] Continue current frequency toward long and short term goals. [] Specific Instructions for subsequent treatments:       Time In: 4:00 pm          Time Out: 4:48 pm    Electronically signed by:   Shakira Ann PT

## 2022-07-28 ENCOUNTER — HOSPITAL ENCOUNTER (OUTPATIENT)
Dept: PHYSICAL THERAPY | Facility: CLINIC | Age: 77
Setting detail: THERAPIES SERIES
Discharge: HOME | End: 2022-07-28
Payer: MEDICARE

## 2022-07-28 PROCEDURE — 97116 GAIT TRAINING THERAPY: CPT

## 2022-07-28 PROCEDURE — 97110 THERAPEUTIC EXERCISES: CPT

## 2022-07-28 NOTE — FLOWSHEET NOTE
[] [address]  [address]  [phone]  F: [phone] [x] [address]  [facility] 36   [address]  P: [phone]  F: [phone] [] [place] &  Therapy  [address]  P: [phone]  F: [phone] [] [address]  P: [phone]  F: [phone] [] [address]  [facility]   [address]   [place]: [phone]  F: [phone]      Physical Therapy Daily Treatment Note    Date:  2022  Patient Name:  Mary Gill    :  1945  MRN: [id number]  Physician: CRISTINA Santamaria CNP                        Insurance: MEDICARE (No Copay)/[facility]   Medical Diagnosis: M47.816 (ICD-10-CM) - Lumbar spondylosis  Rehab Codes: M54.59, M25.651, M25.652, R29.3, M62.81  Onset Date: 2020                    Next 's appt.: 22  Visit# / total visits: ; Progress note for Medicare patient due at visit 10     Cancels/No Shows:     Subjective:    Pain:  [x] Yes  [] No Location: LBP   Pain Rating: (0-10 scale) 4-5/10  Pain altered Tx:  [x] No  [] Yes  Action:    Comment: Pt reports moderate LBP and mild N/T down B LE's.        Objective:  Modalities:   Precautions:  Exercises: bold = completed   Exercise BILAT Reps/ Time Weight/ Level Comments   SciFit NuStep  6 mins Lvl 3  not available    Seated recumbent bike  5'            Seated          Lumbar flexion stretch  10x3s       Abdominal bracing  10x       Trunk rotation  10x Ball  Arms extended holding ball    Side bend stretch  5x5\"     Trunk extension over bolster  10x5\"     B HS curls  10xea  Blue  New           Supine       Hs stretch B 3x30s  Strap  Inc hold time    L hip flexor stretch  1'     PPT 10x 5\"    SLR 10x2 1# Added wt  *painful      Assessment: [x] Progressing toward goals. Initiated therapy session on seated recumbent bike due to Nustep being unavailable. Transitioned to supine stretching to reduce muscular tension with good tolerance. Able to resume multiple balance interventions. Increased reps for changing speed with walking, sit to stands, and high knee march to lunges to progress strength. Pt denies any increase in pain post session. Will continue to monitor pt response to treatment and progress as able. [] No change. [] Other:   [x] Patient would continue to benefit from skilled physical therapy services in order to: manage pain, improve lumbar mobility particularly with B SB ROM, improve flexibility of L hip flexors/quad and B hamstrings, improve core stabilizers and BLE strength L>R, and improve standing balance to promote BLE controlled stability to prevent fall risk and assist pt in returning to prior level of function. Problems:    [x] ? Pain: 3-10/10 low back   [x] ? ROM: lumbar B SB, decrease L hip flexors/quad and B HS flexibility   [x] ? Strength: BLE strength L>R   [x] ?  Function: Oswestry 46% impaired   [x] Other: impaired posture            STG: (to be met in 6 treatments) - Re-assessed on 7/20/22 by An Heidi Castillo, PT  Pt will reduce pain to less than or equal to 4/10 with ADLs - MET (3/10 at most)   Pt will be able to achieve at least 0-30° B 90-90 SLR to demo improved B hamstrings flexibility to ease difficulty with daily ambulation - MET (R 25, L 28)  Pt will improve LLE strength to at least 4/5 globally to reduce difficulty with caregiver-related tasks - MET   Pt will be able to maintain B tandem stance for at least 10sec with minimal to no sway to demo improved BLE stability - MET  Pt will improve B lumbar SB ROM to less than 50% limited to demo reduced lumbar paraspinals guarding to participate in daily functional activities - Not Met (50% limited B SB)   Pt will improve L hip flexors/quad flexibility as evident by achieving at least 90° L Ely's to ease difficulty with stairs negotiation - MET   Patient to be independent with home exercise program as demonstrated by performance with correct form without cues. - MET      LTG: (to be met in 12 treatments)   Pt will improve Oswestry score from 46% impaired to less than 36% impaired to demo improved functional mobility to participate in daily functional activities  Pt will be able to maintain B SLS for at least 10sec to demo improved BLE stability   Pt will be able to complete 5xSTS without BUE support in less than 20sec to demo improved functional strength of BLE         Patient goals: \"to doing things I want\"     Pt. Education:  [x] Yes  [x] No  [] Reviewed Prior HEP/Ed  Method of Education: [] Verbal  [] Demo  [] Written    Access Code: [email or web address]  URL: [email or web address]  Date: 05/27/2022  Prepared by: Shakira Bradford     Exercises  Supine Lower Trunk Rotation - 1 x daily - 7 x weekly - 1 sets - 10 reps - 5s hold  Gastroc Stretch on Wall - 1 x daily - 7 x weekly - 1 sets - 3 reps - 20s hold  Standing March with Counter Support - 1 x daily - 7 x weekly - 2 sets - 10 reps  Standing Hip Flexion with Counter Support - 1 x daily - 7 x weekly - 2 sets - 10 reps  Standing Hip Abduction with Counter Support - 1 x daily - 7 x weekly - 2 sets - 10 reps  Standing Hip Extension with Counter Support - 1 x daily - 7 x weekly - 2 sets - 10 reps  Clamshell with Resistance - 1 x daily - 7 x weekly - 2 sets - 10 reps     Comprehension of Education:  [] Verbalizes understanding. [] Demonstrates understanding. [] Needs review. [x] Demonstrates/verbalizes HEP/Ed previously given. Good carryover of exercises given cues. Plan: [x] Continue current frequency toward long and short term goals.     [] Specific Instructions for subsequent treatments:       Time In: 4:00 pm          Time Out: 4:50 pm    Electronically signed by:  Laura Alvarez PTA

## 2022-07-30 DIAGNOSIS — E11.9 TYPE 2 DIABETES MELLITUS WITHOUT COMPLICATION, WITHOUT LONG-TERM CURRENT USE OF INSULIN (HCC): ICD-10-CM

## 2022-07-30 DIAGNOSIS — M48.02 CERVICAL STENOSIS OF SPINAL CANAL: ICD-10-CM

## 2022-08-01 RX ORDER — GLIMEPIRIDE 2 MG/1
TABLET ORAL
Qty: 180 TABLET | Refills: 3 | Status: SHIPPED | OUTPATIENT
Start: 2022-08-01

## 2022-08-01 RX ORDER — GABAPENTIN 300 MG/1
CAPSULE ORAL
Qty: 180 CAPSULE | Refills: 3 | Status: SHIPPED | OUTPATIENT
Start: 2022-08-01 | End: 2022-10-30

## 2022-08-02 ENCOUNTER — HOSPITAL ENCOUNTER (OUTPATIENT)
Dept: PHYSICAL THERAPY | Facility: CLINIC | Age: 77
Setting detail: THERAPIES SERIES
Discharge: HOME | End: 2022-08-02
Payer: MEDICARE

## 2022-08-02 NOTE — FLOWSHEET NOTE
[] [address]  [address]    [phone]  F: [phone]   [x] [address]  [facility] 36   [address]  P: [phone]  F: [phone]  [] [address]  [address] [facility]  P: [phone]  F: [phone] [] [address]  P: [phone]  F: [phone]  [] [address]  [address] [facility] [address]   [address]   [place]: [phone]  F: [phone]   [] [place] [address] [facility] [address]  [place]: [phone]   F: [phone]     Physical Therapy Cancel/No Show note    Date: 2022  Patient: Chintan Velasco  : 1945  MRN: [id number]    Cancels/No Shows to date: 3/0    For today's appointment patient:    [x]  Cancelled    [] Rescheduled appointment    [] No-show     Reason given by patient:    []  Patient ill    []  Conflicting appointment    [] No transportation      [] Conflict with work    [] No reason given    [] Weather related    [] COVID-19    [x] Other:      Comments: Pt called to cancel due to grandson in hospital - PTA left voicemail to remind pt about upcoming appt. [] Next appointment was confirmed    Electronically signed by:  Shakira Waller, PT

## 2022-08-04 ENCOUNTER — APPOINTMENT (OUTPATIENT)
Dept: PHYSICAL THERAPY | Facility: CLINIC | Age: 77
End: 2022-08-04
Payer: MEDICARE

## 2022-08-04 DIAGNOSIS — K21.9 GASTROESOPHAGEAL REFLUX DISEASE WITHOUT ESOPHAGITIS: ICD-10-CM

## 2022-08-04 RX ORDER — OMEPRAZOLE 40 MG/1
CAPSULE, DELAYED RELEASE ORAL
Qty: 90 CAPSULE | Refills: 1 | Status: SHIPPED | OUTPATIENT
Start: 2022-08-04

## 2022-08-09 ENCOUNTER — APPOINTMENT (OUTPATIENT)
Dept: PHYSICAL THERAPY | Facility: CLINIC | Age: 77
End: 2022-08-09
Payer: MEDICARE

## 2022-08-11 ENCOUNTER — APPOINTMENT (OUTPATIENT)
Dept: PHYSICAL THERAPY | Facility: CLINIC | Age: 77
End: 2022-08-11
Payer: MEDICARE

## 2022-08-12 RX ORDER — TAMSULOSIN HYDROCHLORIDE 0.4 MG/1
CAPSULE ORAL
Qty: 90 CAPSULE | Refills: 0 | Status: SHIPPED | OUTPATIENT
Start: 2022-08-12 | End: 2022-08-17 | Stop reason: SDUPTHER

## 2022-08-17 ENCOUNTER — OFFICE VISIT (OUTPATIENT)
Dept: FAMILY MEDICINE CLINIC | Age: 77
End: 2022-08-17
Payer: MEDICARE

## 2022-08-17 VITALS
HEART RATE: 78 BPM | TEMPERATURE: 97.5 F | HEIGHT: 71 IN | OXYGEN SATURATION: 97 % | WEIGHT: 184 LBS | BODY MASS INDEX: 25.76 KG/M2 | SYSTOLIC BLOOD PRESSURE: 112 MMHG | RESPIRATION RATE: 16 BRPM | DIASTOLIC BLOOD PRESSURE: 64 MMHG

## 2022-08-17 DIAGNOSIS — E78.5 HYPERLIPIDEMIA, UNSPECIFIED HYPERLIPIDEMIA TYPE: ICD-10-CM

## 2022-08-17 DIAGNOSIS — E11.9 TYPE 2 DIABETES MELLITUS WITHOUT COMPLICATION, WITHOUT LONG-TERM CURRENT USE OF INSULIN (HCC): Primary | ICD-10-CM

## 2022-08-17 DIAGNOSIS — I10 HYPERTENSION, UNSPECIFIED TYPE: ICD-10-CM

## 2022-08-17 DIAGNOSIS — M47.816 LUMBAR SPONDYLOSIS: ICD-10-CM

## 2022-08-17 DIAGNOSIS — K21.9 GASTROESOPHAGEAL REFLUX DISEASE WITHOUT ESOPHAGITIS: ICD-10-CM

## 2022-08-17 DIAGNOSIS — R39.11 URINARY HESITANCY: ICD-10-CM

## 2022-08-17 DIAGNOSIS — Z12.5 PROSTATE CANCER SCREENING: ICD-10-CM

## 2022-08-17 LAB
CHP ED QC CHECK: PRESENT
GLUCOSE BLD-MCNC: 178 MG/DL
HBA1C MFR BLD: 6.6 %

## 2022-08-17 PROCEDURE — G8417 CALC BMI ABV UP PARAM F/U: HCPCS | Performed by: NURSE PRACTITIONER

## 2022-08-17 PROCEDURE — G8427 DOCREV CUR MEDS BY ELIG CLIN: HCPCS | Performed by: NURSE PRACTITIONER

## 2022-08-17 PROCEDURE — 83036 HEMOGLOBIN GLYCOSYLATED A1C: CPT | Performed by: NURSE PRACTITIONER

## 2022-08-17 PROCEDURE — 3044F HG A1C LEVEL LT 7.0%: CPT | Performed by: NURSE PRACTITIONER

## 2022-08-17 PROCEDURE — 99213 OFFICE O/P EST LOW 20 MIN: CPT | Performed by: NURSE PRACTITIONER

## 2022-08-17 PROCEDURE — 1123F ACP DISCUSS/DSCN MKR DOCD: CPT | Performed by: NURSE PRACTITIONER

## 2022-08-17 PROCEDURE — 82962 GLUCOSE BLOOD TEST: CPT | Performed by: NURSE PRACTITIONER

## 2022-08-17 PROCEDURE — 1036F TOBACCO NON-USER: CPT | Performed by: NURSE PRACTITIONER

## 2022-08-17 RX ORDER — TAMSULOSIN HYDROCHLORIDE 0.4 MG/1
0.4 CAPSULE ORAL 2 TIMES DAILY
Qty: 180 CAPSULE | Refills: 1 | Status: SHIPPED | OUTPATIENT
Start: 2022-08-17

## 2022-08-17 SDOH — ECONOMIC STABILITY: FOOD INSECURITY: WITHIN THE PAST 12 MONTHS, YOU WORRIED THAT YOUR FOOD WOULD RUN OUT BEFORE YOU GOT MONEY TO BUY MORE.: NEVER TRUE

## 2022-08-17 SDOH — ECONOMIC STABILITY: FOOD INSECURITY: WITHIN THE PAST 12 MONTHS, THE FOOD YOU BOUGHT JUST DIDN'T LAST AND YOU DIDN'T HAVE MONEY TO GET MORE.: NEVER TRUE

## 2022-08-17 ASSESSMENT — SOCIAL DETERMINANTS OF HEALTH (SDOH): HOW HARD IS IT FOR YOU TO PAY FOR THE VERY BASICS LIKE FOOD, HOUSING, MEDICAL CARE, AND HEATING?: NOT HARD AT ALL

## 2022-08-17 ASSESSMENT — PATIENT HEALTH QUESTIONNAIRE - PHQ9
2. FEELING DOWN, DEPRESSED OR HOPELESS: 0
SUM OF ALL RESPONSES TO PHQ9 QUESTIONS 1 & 2: 0
SUM OF ALL RESPONSES TO PHQ QUESTIONS 1-9: 0
1. LITTLE INTEREST OR PLEASURE IN DOING THINGS: 0

## 2022-08-17 ASSESSMENT — ENCOUNTER SYMPTOMS
DIARRHEA: 0
SHORTNESS OF BREATH: 0
SORE THROAT: 0
COUGH: 0
NAUSEA: 0
EYE PAIN: 0
BACK PAIN: 1
VOMITING: 0
ABDOMINAL PAIN: 0
SINUS PAIN: 0

## 2022-08-17 NOTE — PROGRESS NOTES
[address] [address] [facility]  [address] [address]  Dept: [phone]  Dept Fax: [phone]    Alfonso Quezada is a 68 y.o. male who presents today for his medicalconditions/complaints as noted below. Alfonso Quezada is c/o of Diabetes and Hypertension      HPI:     68. Y.o for follow up. Current complaints include progressive memory decline. Reports that for the past several years he has noticed slightly worsening memory. Had been referred to neurology, MRI negative. aricept, increased to 10 mg, memory slightly worse pt reports, sees neuro in October. History of type 2 diabetes takes Januvia 100, metformin 2000, glimepiride 4 mg daily. Last A1c 6.8, today 6.6. History hypertension takes amlodpine 2.5 mg, bp well controlled today     History hyperlipidemia takes Zocor 20 mg, last lipid stable. History of generalized arthritis and neuropathy takes gabapentin 300 twice daily and meloxicam 7.5. Ongoing pain lower back, imaging showing degeneration. Had been doing pt, difficult to make appt with son illness. Gerd, omeprazole 40 mg seemed to work better for than protonix     Urinary frequency and hesitancy, incomplete emptying. Had trialed flomax , proscar without improvement. Last PSA had improved. Seeieng some nocturia.        Past Medical History:   Diagnosis Date    Depression     Diabetes mellitus (HCC)     Hypertension     Neuropathy         Current Outpatient Medications   Medication Sig Dispense Refill    tamsulosin (FLOMAX) 0.4 MG capsule Take 1 capsule by mouth in the morning and at bedtime 180 capsule 1    omeprazole (PRILOSEC) 40 MG delayed release capsule TAKE 1 CAPSULE EVERY MORNING BEFORE BREAKFAST 90 capsule 1    gabapentin (NEURONTIN) 300 MG capsule TAKE 1 CAPSULE TWICE A  capsule 3    glimepiride (AMARYL) 2 MG tablet TAKE 2 TABLETS EVERY MORNING 180 tablet 3    finasteride (PROSCAR) 5 MG tablet Take 1 tablet by mouth daily 90 tablet 1    sitaGLIPtan-metFORMIN (JANUMET)  MG per tablet Janumet  MG Oral Tablet TAKE 1 TABLET TWICE DAILY WITH MEALS. Refills: 0 Active 180 tablet 5    simvastatin (ZOCOR) 20 MG tablet TAKE 1 TABLET NIGHTLY 90 tablet 3    ondansetron (ZOFRAN) 4 MG tablet Take 1 tablet by mouth 3 times daily as needed for Nausea or Vomiting 15 tablet 0    donepezil (ARICEPT) 10 MG tablet Take 1 tablet by mouth nightly 90 tablet 3    amLODIPine (NORVASC) 2.5 MG tablet Take 1 tablet by mouth daily 90 tablet 3    meloxicam (MOBIC) 7.5 MG tablet Take 1 tablet by mouth daily 90 tablet 5     No current facility-administered medications for this visit. No Known Allergies    Subjective:      Review of Systems   Constitutional:  Negative for chills and fatigue. HENT:  Negative for congestion, ear pain, sinus pain and sore throat. Eyes:  Negative for pain and visual disturbance. Respiratory:  Negative for cough and shortness of breath. Cardiovascular:  Negative for chest pain and palpitations. Gastrointestinal:  Negative for abdominal pain, diarrhea, nausea and vomiting. Genitourinary:  Positive for frequency. Negative for penile pain and testicular pain. Musculoskeletal:  Positive for back pain. Negative for joint swelling and neck pain. Skin:  Negative for rash. Neurological:  Negative for dizziness and light-headedness. Hematological:  Does not bruise/bleed easily. All other systems reviewed and are negative.    :Objective     Physical Exam  Vitals and nursing note reviewed. Constitutional:       Appearance: Normal appearance. Cardiovascular:      Rate and Rhythm: Normal rate. Pulmonary:      Effort: Pulmonary effort is normal.      Breath sounds: Normal breath sounds. Skin:     General: Skin is warm and dry. Neurological:      General: No focal deficit present. Mental Status: He is alert and oriented to person, place, and time.      /64 (Site: Left Component Date Value    Hemoglobin A1C 03/14/2022 6.8        Assessment and Plan      1. Type 2 diabetes mellitus without complication, without long-term current use of insulin (HCC)  -     POCT glycosylated hemoglobin (Hb A1C)  -     POCT Glucose  -     CBC with Auto Differential; Future  -     Comprehensive Metabolic Panel; Future  2. Gastroesophageal reflux disease without esophagitis  -     CBC with Auto Differential; Future  3. Lumbar spondylosis  -     CBC with Auto Differential; Future  4. Urinary hesitancy  -     CBC with Auto Differential; Future  5. Hypertension, unspecified type  -     CBC with Auto Differential; Future  -     TSH With Reflex Ft4; Future  6. Prostate cancer screening  -     CBC with Auto Differential; Future  -     PSA Screening; Future  -     tamsulosin (FLOMAX) 0.4 MG capsule; Take 1 capsule by mouth in the morning and at bedtime, Disp-180 capsule, R-1Normal  7. Hyperlipidemia, unspecified hyperlipidemia type  -     Lipid Panel; Future     Flomax increase to bid  Labs ordered  Med check, labs before in 6 months              Results for orders placed or performed in visit on 08/17/22   POCT glycosylated hemoglobin (Hb A1C)   Result Value Ref Range    Hemoglobin A1C 6.6 %   POCT Glucose   Result Value Ref Range    Glucose 178 mg/dL    QC OK? Present              Return in about 6 months (around 2/17/2023), or if symptoms worsen or fail to improve. Orders Placed This Encounter   Medications    tamsulosin (FLOMAX) 0.4 MG capsule     Sig: Take 1 capsule by mouth in the morning and at bedtime     Dispense:  180 capsule     Refill:  1        Patient given educational materials - see patient instructions. Discussed use, benefit, and side effects of prescribed medications. All patientquestions answered. Pt voiced understanding. Patient given educational materials - see patient instructions. Discussed use, benefit, and side effects of prescribed medications.   All patientquestions answered. Pt voiced understanding. This note was transcribed using dictation with Dragon services. Efforts were made to correct any errors but some words may be misinterpreted.     Patient assumes risks associated with failure to complete recommended testing and treatments in a timely manner    Electronically signed by CRISTINA Scott CNP on 8/17/2022at 2:54 PM

## 2022-08-17 NOTE — PROGRESS NOTES
Visit Information    Have you changed or started any medications since your last visit including any over-the-counter medicines, vitamins, or herbal medicines? no   Are you having any side effects from any of your medications? -  no  Have you stopped taking any of your medications? Is so, why? -  no    Have you seen any other physician or provider since your last visit? No  Have you had any other diagnostic tests since your last visit? No  Have you been seen in the emergency room and/or had an admission to a hospital since we last saw you? No  Have you had your routine dental cleaning in the past 6 months? yes -     Have you activated your [employer] account? If not, what are your barriers?  No:      Patient Care Team:  CRISTINA Jauregui CNP as PCP - General (Certified Nurse Practitioner)  CRISTINA Jauregui CNP as PCP - Danny Noriega Provider  Som Romo DPM as Physician (Podiatry)    Medical History Review  Past Medical, Family, and Social History reviewed and does contribute to the patient presenting condition    Health Maintenance   Topic Date Due    DTaP/Tdap/Td vaccine (1 - Tdap) Never done    Shingles vaccine (1 of 2) Never done    COVID-19 Vaccine (3 - Booster for Veda Lied series) 07/26/2021    Annual Wellness Visit (AWV)  03/18/2022    Flu vaccine (1) 09/01/2022    Depression Screen  03/14/2023    Lipids  03/16/2023    Pneumococcal 65+ years Vaccine  Completed    Hepatitis A vaccine  Aged Out    Hib vaccine  Aged Out    Meningococcal (ACWY) vaccine  Aged Out    Hepatitis C screen  Discontinued

## 2022-08-17 NOTE — PATIENT INSTRUCTIONS
Patient Education        High Blood Pressure: Care Instructions  Overview     It's normal for blood pressure to go up and down throughout the day. But if it stays up, you have high blood pressure. Another name for high blood pressure ishypertension. Despite what a lot of people think, high blood pressure usually doesn't cause headaches or make you feel dizzy or lightheaded. It usually has no symptoms. But it does increase your risk of stroke, heart attack, and other problems. You and your doctor will talk about your risks of these problems based on yourblood pressure. Your doctor will give you a goal for your blood pressure. Your goal will bebased on your health and your age. Lifestyle changes, such as eating healthy and being active, are always important to help lower blood pressure. You might also take medicine to reachyour blood pressure goal.  Follow-up care is a key part of your treatment and safety. Be sure to make and go to all appointments, and call your doctor if you are having problems. It's also a good idea to know your test results and keep alist of the medicines you take. How can you care for yourself at home? Medical treatment  If you stop taking your medicine, your blood pressure will go back up. You may take one or more types of medicine to lower your blood pressure. Be safe with medicines. Take your medicine exactly as prescribed. Call your doctor if you think you are having a problem with your medicine. Talk to your doctor before you start taking aspirin every day. Aspirin can help certain people lower their risk of a heart attack or stroke. But taking aspirin isn't right for everyone, because it can cause serious bleeding. See your doctor regularly. You may need to see the doctor more often at first or until your blood pressure comes down. If you are taking blood pressure medicine, talk to your doctor before you take decongestants or anti-inflammatory medicine, such as ibuprofen.  Some of these medicines can raise blood pressure. Learn how to check your blood pressure at home. Lifestyle changes  Stay at a healthy weight. This is especially important if you put on weight around the waist. Losing even 10 pounds can help you lower your blood pressure. If your doctor recommends it, get more exercise. Walking is a good choice. Bit by bit, increase the amount you walk every day. Try for at least 30 minutes on most days of the week. You also may want to swim, bike, or do other activities. Avoid or limit alcohol. Talk to your doctor about whether you can drink any alcohol. Try to limit how much sodium you eat to less than 2,300 milligrams (mg) a day. Your doctor may ask you to try to eat less than 1,500 mg a day. Eat plenty of fruits (such as bananas and oranges), vegetables, legumes, whole grains, and low-fat dairy products. Lower the amount of saturated fat in your diet. Saturated fat is found in animal products such as milk, cheese, and meat. Limiting these foods may help you lose weight and also lower your risk for heart disease. Do not smoke. Smoking increases your risk for heart attack and stroke. If you need help quitting, talk to your doctor about stop-smoking programs and medicines. These can increase your chances of quitting for good. When should you call for help? Call 911  anytime you think you may need emergency care. This may mean having symptoms that suggest that your blood pressure is causing a serious heart or blood vessel problem. Your blood pressure may be over 180/120. For example, call 911 if:    You have symptoms of a heart attack. These may include:  Chest pain or pressure, or a strange feeling in the chest.  Sweating. Shortness of breath. Nausea or vomiting. Pain, pressure, or a strange feeling in the back, neck, jaw, or upper belly or in one or both shoulders or arms. Lightheadedness or sudden weakness. A fast or irregular heartbeat.      You have symptoms of a stroke. These may include:  Sudden numbness, tingling, weakness, or loss of movement in your face, arm, or leg, especially on only one side of your body. Sudden vision changes. Sudden trouble speaking. Sudden confusion or trouble understanding simple statements. Sudden problems with walking or balance. A sudden, severe headache that is different from past headaches. You have severe back or belly pain. Do not wait until your blood pressure comes down on its own. Get help right away. Call your doctor now or seek immediate care if:    Your blood pressure is much higher than normal (such as 180/120 or higher), but you don't have symptoms. You think high blood pressure is causing symptoms, such as:  Severe headache. Blurry vision. Watch closely for changes in your health, and be sure to contact your doctor if:    Your blood pressure measures higher than your doctor recommends at least 2 times. That means the top number is higher or the bottom number is higher, or both. You think you may be having side effects from your blood pressure medicine. Where can you learn more? Go to [email or web address] and sign in to your [facility] account. Enter F673 in the LittleLives box to learn more about \"High Blood Pressure: Care Instructions. \"     If you do not have an account, please click on the \"Sign Up Now\" link. Current as of: January 10, 2022               Content Version: 13.3  © 0954-6737 Healthwise, Incorporated. Care instructions adapted under license by [facility] ([facility]). If you have questions about a medical condition or this instruction, always ask your healthcare professional. [place] [address] any warranty or liability for your use of this information.

## 2022-08-22 ENCOUNTER — HOSPITAL ENCOUNTER (OUTPATIENT)
Dept: PHYSICAL THERAPY | Facility: CLINIC | Age: 77
Setting detail: THERAPIES SERIES
Discharge: HOME | End: 2022-08-22
Payer: MEDICARE

## 2022-08-22 PROCEDURE — 97110 THERAPEUTIC EXERCISES: CPT

## 2022-08-22 NOTE — FLOWSHEET NOTE
[] [facility] [address] [facility]  [address]  [phone]  F: [phone] [x] [address]  [facility]   [address]  P: [phone]  F: [phone] [] [address]  [address]  P: [phone]  F: [phone] [] [address]  P: [phone]  F: [phone] [] [address]  [facility]   [address]   [place]: [phone]  F: [phone]      Physical Therapy Daily Treatment Note    Date:  2022  Patient Name:  Tara Rod    :  1945  MRN: [id number]  Physician: CRISTINA Thurston CNP                        Insurance: MEDICARE (No Copay)/[facility]   Medical Diagnosis: M47.816 (ICD-10-CM) - Lumbar spondylosis  Rehab Codes: M54.59, M25.651, M25.652, R29.3, M62.81  Onset Date: 2020                    Next 's appt.: 22  Visit# / total visits: ; Progress note for Medicare patient due at visit 10     Cancels/No Shows:     Subjective:    Pain:  [] Yes  [x] No Location: LBP   Pain Rating: (0-10 scale) 0/10  Pain altered Tx:  [x] No  [] Yes  Action:    Comment: Pt returned to physical therapy 1 month after jose martin was admitted to hospital.  Pt noted has been climbing ladder with a little bit of \"shakiness here and there\" but not as bad as before. Pt noted has been very careful and no fall since starting therapy. Pt noted has been busy taking care of jose martin and busy selling/buying house therefore has not been able to complete HEP since holding therapy.         Objective:  Modalities:   Precautions:  Exercises: bold = completed today  Exercise BILAT Reps/ Time Weight/ Level Comments   SciFit NuStep  6 mins Lvl 3  not available    Seated recumbent bike  5'            Seated          Lumbar flexion stretch  10x3s       Abdominal bracing  10x       Trunk rotation  10x Ball  Arms extended holding ball    Side bend stretch  5x5\"     Trunk extension over bolster  10x5\"     B HS curls  10xea  Blue  New 7/27          Supine       Hs stretch B 3x30s  Strap  Inc hold time 7/20   L hip flexor stretch  1'     PPT 10x 5\"    SLR 10x2 2# Inc wt 8/22   Single knee fallout + TA 10x     Marches + TA 10x2 1# Added wt 7/20; no wt 7/27   Ball press  10x5s  New 7/13   90/90 B leg lift  10x3s  New 7/13   LTR 10x5s  New 7/27   SKTC 3x20s  New 7/27   Bridges  2x10  New 7/27         Sidelying       Hip abd  10x2 2# Added wt 7/13         Prone      B hip flexors stretch 1' ea  New 7/20         Standing          gastroc stretch on SB  3x30\"      Step ups  15xea  6\" Progressed 7/11   Marching on foam  2x10   Added foam 7/13   Heel raises on foam  20x   Inc reps & added foam 7/13   3 ways hip 10x   Increased reps 7/11; reduced reps 7/27   Hs curls       NBOS on foam eyes open 2x30\"     NBOS on foam eyes closed 2x30\"     Tandem stance + foam 2x30\"ea  Inc stance time 8/2   STS  10x  New 7/27, inc reps 7/28   Step over orange yaakov 4x fwd  2x lat  New 7/13   Walk with changing speed 2 lap  New 7/27-verbal cues \"fast\" & \"slow\"  Inc laps 7/28   Walk with changing head direction 2 laps  New 8/22   Obstacle course 4x  New 7/27-walk around 4 cones then \"walk on tight rope\" on foam    High knee march + lunges 3x ea  New 7/27, inc reps 7/28   Other:      Specific Instructions for next treatment:  - Initiate core strength  - Progress BLE strength and flexibility of B hip flexors/quad L>R and B hamstrings   - Standing balance training on various surfaces  - CP/HP as needed pre-/post-treatment to manage pain and muscle guarding             Treatment Charges: Mins Units   []  Modalities:HP     [x]  Ther Exercise 40 3   []  Manual Therapy     []  Ther Activities     []  Aquatics     []  Vasocompression     [x]  Other- Gait 6 --   Total Treatment time 46 3   Re-assessed on 8/22/22 by An Jaunita Spatz, PT      Total Medicare Cost (8/22/2022): $1,031.11       Objective: Re-assessed on 8/22/22 by An Jaunita Spatz, PT  90/90 SLR: R 30, L 30  Tandem Stance (eyes open): R 20 sec, L 30 sec        STRENGTH     Left Right   L1-2 Hip Flex 4+/5 4/5   Hip Abd 4+/5 4+/5   Knee Flex 4/5 4+/5   L3-4 Knee Ext 4+/5 4+/5           L4 Ankle DF 5/5 5/5   S1 Plant. Flex 5/5 5/5   Abdominals poor poor   Erector Spinae poor poor           *painful      Assessment: [x] Progressing toward goals. Re-assessment completed this date due to unable to come to therapy for 1 month. Pt demo reduced R hip flexors strength and B hamstrings flexibility. Tandem stance with EO demo reduced RLE stability. Treatment session focus on improving functional strength, flexibility, and stability of BLE. Cont standing balance training to prevent future fall risk. End session with gait training focus on dual tasking to challenge dynamic balance. Will cont per initial POC to increase progression towards goal.      [] No change. [] Other:   [x] Patient would continue to benefit from skilled physical therapy services in order to: manage pain, improve lumbar mobility particularly with B SB ROM, improve flexibility of L hip flexors/quad and B hamstrings, improve core stabilizers and BLE strength L>R, and improve standing balance to promote BLE controlled stability to prevent fall risk and assist pt in returning to prior level of function. Problems:    [x] ? Pain: 3-10/10 low back   [x] ? ROM: lumbar B SB, decrease L hip flexors/quad and B HS flexibility   [x] ? Strength: BLE strength L>R   [x] ?  Function: Oswestry 46% impaired   [x] Other: impaired posture            STG: (to be met in 6 treatments) - Re-assessed on 8/22/22  by An Jaunita Spatz, PT  Pt will reduce pain to less than or equal to 4/10 with ADLs - MET (0/10 LBP)   Pt will be able to achieve at least 0-30° B 90-90 SLR to demo improved B weekly - 2 sets - 10 reps  Clamshell with Resistance - 1 x daily - 7 x weekly - 2 sets - 10 reps     Comprehension of Education:  [] Verbalizes understanding. [] Demonstrates understanding. [] Needs review. [x] Demonstrates/verbalizes HEP/Ed previously given. Good carryover of exercises given cues. Plan: [x] Continue current frequency toward long and short term goals. [] Specific Instructions for subsequent treatments:       Time In: 3:00 pm          Time Out: 3:53 pm    Electronically signed by:   Shakira Germain PT

## 2022-08-22 NOTE — PROGRESS NOTES
[] [facility]) - [facility] &  Therapy  [address]  [phone]  F: [phone] [x] [address]  [facility]   [address]  P: [phone]  F: [phone] [] [address] &  Therapy  [address]  P: [phone]  F: [phone] [] [address]  P: [phone]  F: [phone] [] [address]  [facility]   [address]   [place]: [phone]  F: [phone]      Physical Therapy Progress Note    Date: 2022      Patient: Rosalie Fisher  : 1945  MRN: [id number]    Physician: CRISTINA Milton CNP                        Insurance: MEDICARE (No Copay)/[facility]   Medical Diagnosis: M47.816 (ICD-10-CM) - Lumbar spondylosis  Rehab Codes: M54.59, M25.651, M25.652, R29.3, M62.81  Onset Date: 2020                    Next 's appt.: 22  Visit# / total visits: ; Progress note for Medicare patient due at visit 10                                    Cancels/No Shows: 3/1         Subjective:    Pain:  [] Yes  [x] No   Location: LBP                          Pain Rating: (0-10 scale) 0/10  Pain altered Tx:  [x] No  [] Yes  Action:    Comment: Pt returned to physical therapy 1 month after jose martin was admitted to hospital.  Pt noted has been climbing ladder with a little bit of \"shakiness here and there\" but not as bad as before. Pt noted has been very careful and no fall since starting therapy. Pt noted has been busy taking care of jose martin and busy selling/buying house therefore has not been able to complete HEP since holding therapy.         Objective: Re-assessed on 22 by Shakira Ndiaye, PT  90/90 SLR: R 30, L 30  Tandem Stance (eyes open): R 20 sec, L 30 sec      STRENGTH     Left Right   L1-2 Hip Flex 4+/5 4/5   Hip Abd 4+/5 4+/5   Knee Flex 4/5 4+/5   L3-4 Knee Ext 4+/5 4+/5           L4 Ankle DF 5/5 5/5   S1 Plant. Flex 5/5 5/5   Abdominals poor poor   Erector Spinae poor poor           *painful        Assessment:  [x] Progressing toward goals. Re-assessment completed this date due to unable to come to therapy for 1 month. Pt demo reduced R hip flexors strength and B hamstrings flexibility. Tandem stance with EO demo reduced RLE stability. Treatment session focus on improving functional strength, flexibility, and stability of BLE. Cont standing balance training to prevent future fall risk. End session with gait training focus on dual tasking to challenge dynamic balance. Will cont per initial POC to increase progression towards goal.                            [] No change. [] Other:   [x] Patient would continue to benefit from skilled physical therapy services in order to: manage pain, improve lumbar mobility particularly with B SB ROM, improve flexibility of L hip flexors/quad and B hamstrings, improve core stabilizers and BLE strength L>R, and improve standing balance to promote BLE controlled stability to prevent fall risk and assist pt in returning to prior level of function. Problems:    [x] ? Pain: 3-10/10 low back   [x] ? ROM: lumbar B SB, decrease L hip flexors/quad and B HS flexibility   [x] ? Strength: BLE strength L>R   [x] ?  Function: Oswestry 46% impaired   [x] Other: impaired posture            STG: (to be met in 6 treatments) - Re-assessed on 8/22/22  by An Kalie Chun, PT  Pt will reduce pain to less than or equal to 4/10 with ADLs - MET (0/10 LBP)   Pt will be able to achieve at least 0-30° B 90-90 SLR to demo improved B hamstrings flexibility to ease difficulty with daily ambulation - Regressing (R 30, L 30)  Pt will improve LLE strength to at least 4/5 globally to reduce difficulty with caregiver-related tasks - MET   Pt will be able to maintain B tandem stance for at hesitate to call. Thank you for your referral.    Physician Signature:________________________________Date:__________________  By signing above or cosigning this note, I have reviewed this plan of care and certify a need for medically necessary rehabilitation services.      *PLEASE SIGN ABOVE AND FAX BACK ALL PAGES*

## 2022-08-29 ENCOUNTER — HOSPITAL ENCOUNTER (OUTPATIENT)
Dept: PHYSICAL THERAPY | Facility: CLINIC | Age: 77
Setting detail: THERAPIES SERIES
Discharge: HOME | End: 2022-08-29
Payer: MEDICARE

## 2022-08-29 NOTE — FLOWSHEET NOTE
[] [address]  [address]    [phone]  F: [phone]   [x] [address] [facility] [address]   [address]  P: [phone]  F: [phone]  [] [address]  [address] [facility]  P: [phone]  F: [phone] [] [address]: [phone]  F: [phone]  [] [address] [facility]   [address]   [place]: [phone]  F: [phone]   [] [address]  [place]: [phone]   F: [phone]     Physical Therapy Cancel/No Show note    Date: 2022  Patient: Devaughn Turcios  : 1945  MRN: [id number]    Cancels/No Shows to date:      For today's appointment patient:    [x]  Cancelled    [] Rescheduled appointment    [] No-show     Reason given by patient:    []  Patient ill    []  Conflicting appointment    [] No transportation      [] Conflict with work    [] No reason given    [] Weather related    [] COVID-19    [x] Other:      Comments: Pt arrived to therapy appointment but would like to cancel today's appointment due to having other life obligations. Pt reported would like to complete the last 3 visits of therapy at [facility] d/t grandson is being transferred to [facility] for extensive outpatient rehab PT/OT/ST 3x/week. [] Next appointment was confirmed    Electronically signed by:  An Jaunita Spatz, PT

## 2022-08-29 NOTE — FLOWSHEET NOTE
[] [facility] &  Therapy  [address]  [phone]  F: [phone] [x] [address]  [facility] 36   [address]  P: [phone]  F: [phone] [] [address] &  Therapy  [address] [facility] [address] [facility] [address]  P: [phone]  F: [phone] [] [address]  [facility]   [address]   [place]: [phone]  F: [phone]     THERAPY RESPONSIBILITY OF CARE TRANSFER FORM       PATIENT NAME: Heather Prasad  MRN: [id number]   : 1945      TRANSFERRING FACILITY:    [] [facility]   [] [facility] Outpatient   [x]  [facility]   [] [facility]   [] [facility]   [] [facility]   [] [facility] Outpatient  [] [facility]   [] Other:       ACCEPTING FACILITY   [] [facility]   [] [facility] Outpatient   []  [place] [address]   [] [address] [facility] OT   [] Pediatrics   [] [facility]   [x] [facility] Outpatient  [] [facility]   [] Other:          REASON FOR TRANSFER: Per pt request as grandson got transferred from [place] [address] to [address] [place] [address] to work with PT/OT/ST 3x/week. TRANSFER OF CARE:    I am transferring the care of the above patient to: Langley Butter, PT An Carlyon Schwab, [place]  2022      ACCEPTANCE OF CARE:     I am accepting the care of the above patient.  Shady Chase PT

## 2022-08-31 ENCOUNTER — HOSPITAL ENCOUNTER (OUTPATIENT)
Dept: PHYSICAL THERAPY | Age: 77
Setting detail: THERAPIES SERIES
Discharge: HOME | End: 2022-08-31
Payer: MEDICARE

## 2022-08-31 PROCEDURE — 97110 THERAPEUTIC EXERCISES: CPT

## 2022-08-31 NOTE — FLOWSHEET NOTE
[x] [facility]) [facility] &  Therapy  [address]  [phone]  F: [phone] [] [address]  [facility] 36   [address]  P: [phone]  F: [phone] [] [address]  [address]  P: [phone]  F: [phone] [] [address]  P: [phone]  F: [phone] [] [address]  [facility]   [address]   [place]: [phone]  F: [phone]      Physical Therapy Daily Treatment Note    Date:  2022  Patient Name:  Wanda Clark    :  1945  MRN: [id number]  Physician: CRISTINA Zavala CNP                        Insurance: MEDICARE (No Copay)/[facility]   Medical Diagnosis: M47.816 (ICD-10-CM) - Lumbar spondylosis  Rehab Codes: M54.59, M25.651, M25.652, R29.3, M62.81  Onset Date: 2020                    Next 's appt.: 22  Visit# / total visits: 10/12; Progress note for Medicare patient due at visit 10     Cancels/No Shows: 2/    Subjective:    Pain:  [] Yes  [x] No Location: LBP   Pain Rating: (0-10 scale) 0/10  Pain altered Tx:  [x] No  [] Yes  Action:    Comment: Pt is transferring from our [place] [address] clinic. He notes mild back pain today.        Objective:  Modalities:   Precautions:  Exercises: bold = completed today  Exercise BILAT Reps/ Time Weight/ Level Comments   SciFit NuStep  5 mins Lvl 3     Seated recumbent bike  5'            Seated          Lumbar flexion stretch  10x3s    w/ physio ball    Trunk rotation  10x Ball  Arms extended holding ball    B HS curls  15xea  Blue     B HS stretch  5x10\"     Supine       L hip flexor stretch  1'     SLR 10x2 2#    Marches + TA 15x 2 lb    LTR 10x5s     SKTC 3x20s     Bridges  15x           Standing          gastroc stretch on SB  3x30\" Step ups  15xea  6\" Progressed 7/11   Marching on foam  20x   Added foam 7/13   Heel raises on foam  20x      3 ways hip 10x      Hs curls       NBOS on foam eyes open 2x30\"     NBOS on foam eyes closed 2x30\"     Tandem stance + foam 2x30\"ea  Inc stance time 8/2   STS  10x     Step over orange yaakov 15x fwd  2x lat     SLS   ADD   Walk with changing speed 2 lap     Walk with changing head direction 2 laps     Obstacle course 4x     High knee march + lunges 3x ea     Other:      Specific Instructions for next treatment:  - Initiate core strength  - Progress BLE strength and flexibility of B hip flexors/quad L>R and B hamstrings   - Standing balance training on various surfaces  - CP/HP as needed pre-/post-treatment to manage pain and muscle guarding             Treatment Charges: Mins Units   []  Modalities:HP     [x]  Ther Exercise 45 3   []  Manual Therapy     []  Ther Activities     []  Aquatics     []  Vasocompression     []  Other-     Total Treatment time 45 3       Total Medicare Cost (8/31/2022): $1,109.81      Objective:   90/90 SLR: R 30, L 30  Tandem Stance (eyes open): R 20 sec, L 30 sec        STRENGTH     Left Right   L1-2 Hip Flex 4+/5 4/5   Hip Abd 4+/5 4+/5   Knee Flex 4/5 4+/5   L3-4 Knee Ext 4+/5 4+/5           L4 Ankle DF 5/5 5/5   S1 Plant. Flex 5/5 5/5   Abdominals poor poor   Erector Spinae poor poor           *painful      Assessment: [x] Progressing toward goals. Patient tolerated all exercise well. He did have difficulty navigating over the yaakov due to shortened step height on the left and poor SL stance stability. [] No change.      [] Other:   [x] Patient would continue to benefit from skilled physical therapy services in order to: manage pain, improve lumbar mobility particularly with B SB ROM, improve flexibility of L hip flexors/quad and B hamstrings, improve core stabilizers and BLE strength L>R, and improve standing balance to promote BLE controlled stability to prevent fall risk and assist pt in returning to prior level of function. Problems:    [x] ? Pain: 3-10/10 low back   [x] ? ROM: lumbar B SB, decrease L hip flexors/quad and B HS flexibility   [x] ? Strength: BLE strength L>R   [x] ? Function: Oswestry 46% impaired   [x] Other: impaired posture            STG: (to be met in 6 treatments) - Re-assessed on 8/22/22  by An Verna , PT  Pt will reduce pain to less than or equal to 4/10 with ADLs - MET (0/10 LBP)   Pt will be able to achieve at least 0-30° B 90-90 SLR to demo improved B hamstrings flexibility to ease difficulty with daily ambulation - Regressing (R 30, L 30)  Pt will improve LLE strength to at least 4/5 globally to reduce difficulty with caregiver-related tasks - MET   Pt will be able to maintain B tandem stance for at least 10sec with minimal to no sway to demo improved BLE stability - MET  Pt will improve B lumbar SB ROM to less than 50% limited to demo reduced lumbar paraspinals guarding to participate in daily functional activities - Not Met (50% limited B SB)   Pt will improve L hip flexors/quad flexibility as evident by achieving at least 90° L Ely's to ease difficulty with stairs negotiation - MET   Patient to be independent with home exercise program as demonstrated by performance with correct form without cues. - MET      LTG: (to be met in 12 treatments) - Ongoing   Pt will improve Oswestry score from 46% impaired to less than 36% impaired to demo improved functional mobility to participate in daily functional activities  Pt will be able to maintain B SLS for at least 10sec to demo improved BLE stability   Pt will be able to complete 5xSTS without BUE support in less than 20sec to demo improved functional strength of BLE         Patient goals: \"to doing things I want\"     Pt. Education:  [x] Yes  [x] No  [] Reviewed Prior HEP/Ed  Method of Education: [] Verbal  [] Demo  [] Written    Access Code: [email or web address]  URL: [email or web address]  Date:

## 2022-09-01 DIAGNOSIS — R39.11 URINARY HESITANCY: ICD-10-CM

## 2022-09-02 RX ORDER — FINASTERIDE 5 MG/1
TABLET, FILM COATED ORAL
Qty: 90 TABLET | Refills: 1 | Status: SHIPPED | OUTPATIENT
Start: 2022-09-02

## 2022-09-07 ENCOUNTER — HOSPITAL ENCOUNTER (OUTPATIENT)
Dept: PHYSICAL THERAPY | Age: 77
Setting detail: THERAPIES SERIES
Discharge: HOME | End: 2022-09-07
Payer: MEDICARE

## 2022-09-07 PROCEDURE — 97110 THERAPEUTIC EXERCISES: CPT

## 2022-09-07 NOTE — FLOWSHEET NOTE
[x] [facility]  [facility]  [address]  [phone]  F: [phone] [] [address]  [facility] [address]   [address]  P: [phone]  F: [phone] [] [address]  [address] [facility] [address] [facility] [address]  P: [phone]  F: [phone] [] [address]  P: [phone]  F: [phone] [] [address]  [facility]   [address]   [place]: [phone]  F: [phone]      Physical Therapy Daily Treatment Note    Date:  2022  Patient Name:  Myrtle Barrera    :  1945  MRN: [id number]  Physician: CRISTINA Black CNP                        Insurance: MEDICARE (No Copay)/[facility]   Medical Diagnosis: M47.816 (ICD-10-CM) - Lumbar spondylosis  Rehab Codes: M54.59, M25.651, M25.652, R29.3, M62.81  Onset Date: 2020                    Next 's appt.: 22  Visit# / total visits: ; Progress note for Medicare patient due at visit 10     Cancels/No Shows: 3/1    Subjective:    Pain:  [] Yes  [x] No Location: LBP   Pain Rating: (0-10 scale) 0/10  Pain altered Tx:  [x] No  [] Yes  Action:    Comment: Mild back stiffness reported today.        Objective:  Modalities:   Precautions:  Exercises: bold = completed today  Exercise BILAT Reps/ Time Weight/ Level Comments   SciFit NuStep  5 mins Lvl 3     Seated recumbent bike  5'            Seated          Lumbar flexion stretch  10x3s    w/ physio ball    Trunk rotation  10x Ball  Arms extended holding ball    B HS curls  15xea  Blue     B HS stretch  5x10\"     Supine       L hip flexor stretch  1'     SLR 10x2 2#    Marches + TA 15x 2 lb    LTR 10x5s     SKTC 3x20s     Bridges  15x           Standing          gastroc stretch on SB  3x30\"      Step ups  20xea  6\" Progressed 7/11 particularly with B SB ROM, improve flexibility of L hip flexors/quad and B hamstrings, improve core stabilizers and BLE strength L>R, and improve standing balance to promote BLE controlled stability to prevent fall risk and assist pt in returning to prior level of function. Problems:    [x] ? Pain: 3-10/10 low back   [x] ? ROM: lumbar B SB, decrease L hip flexors/quad and B HS flexibility   [x] ? Strength: BLE strength L>R   [x] ? Function: Oswestry 46% impaired   [x] Other: impaired posture            STG: (to be met in 6 treatments) - Re-assessed on 8/22/22  by An Jr Davis, PT  Pt will reduce pain to less than or equal to 4/10 with ADLs - MET (0/10 LBP)   Pt will be able to achieve at least 0-30° B 90-90 SLR to demo improved B hamstrings flexibility to ease difficulty with daily ambulation - Regressing (R 30, L 30)  Pt will improve LLE strength to at least 4/5 globally to reduce difficulty with caregiver-related tasks - MET   Pt will be able to maintain B tandem stance for at least 10sec with minimal to no sway to demo improved BLE stability - MET  Pt will improve B lumbar SB ROM to less than 50% limited to demo reduced lumbar paraspinals guarding to participate in daily functional activities - Not Met (50% limited B SB)   Pt will improve L hip flexors/quad flexibility as evident by achieving at least 90° L Ely's to ease difficulty with stairs negotiation - MET   Patient to be independent with home exercise program as demonstrated by performance with correct form without cues. - MET      LTG: (to be met in 12 treatments) - Checked 9/7/22  Pt will improve Oswestry score from 46% impaired to less than 36% impaired to demo improved functional mobility to participate in daily functional activities MET 20% impaired.    Pt will be able to maintain B SLS for at least 10sec to demo improved BLE stability Progress~8 seconds on each   Pt will be able to complete 5xSTS without BUE support in less than 20sec to demo improved functional strength of BLE Met in 12 seconds        Patient goals: \"to doing things I want\"     Pt. Education:  [x] Yes  [x] No  [] Reviewed Prior HEP/Ed  Method of Education: [] Verbal  [] Demo  [] Written    Access Code: [email or web address]  URL: [email or web address]  Date: 05/27/2022  Prepared by: An Verna      Exercises  Supine Lower Trunk Rotation - 1 x daily - 7 x weekly - 1 sets - 10 reps - 5s hold  Gastroc Stretch on Wall - 1 x daily - 7 x weekly - 1 sets - 3 reps - 20s hold  Standing March with Counter Support - 1 x daily - 7 x weekly - 2 sets - 10 reps  Standing Hip Flexion with Counter Support - 1 x daily - 7 x weekly - 2 sets - 10 reps  Standing Hip Abduction with Counter Support - 1 x daily - 7 x weekly - 2 sets - 10 reps  Standing Hip Extension with Counter Support - 1 x daily - 7 x weekly - 2 sets - 10 reps  Clamshell with Resistance - 1 x daily - 7 x weekly - 2 sets - 10 reps     Comprehension of Education:  [] Verbalizes understanding. [] Demonstrates understanding. [] Needs review. [x] Demonstrates/verbalizes HEP/Ed previously given. Good carryover of exercises given cues. Plan: [x] Continue current frequency toward long and short term goals. D/C to HEP after final visit.      [] Specific Instructions for subsequent treatments:       Time In: 0800 am          Time Out: 1811 am    Electronically signed by:  Jose Eduardo Granado, PT

## 2022-11-08 DIAGNOSIS — I10 HYPERTENSION, UNSPECIFIED TYPE: ICD-10-CM

## 2022-11-08 DIAGNOSIS — E87.5 HYPERKALEMIA: ICD-10-CM

## 2022-11-09 RX ORDER — DONEPEZIL HYDROCHLORIDE 10 MG/1
TABLET, FILM COATED ORAL
Qty: 90 TABLET | Refills: 1 | Status: SHIPPED | OUTPATIENT
Start: 2022-11-09

## 2022-11-09 RX ORDER — AMLODIPINE BESYLATE 2.5 MG/1
TABLET ORAL
Qty: 90 TABLET | Refills: 3 | Status: SHIPPED | OUTPATIENT
Start: 2022-11-09

## 2022-11-09 RX ORDER — MELOXICAM 7.5 MG/1
TABLET ORAL
Qty: 90 TABLET | Refills: 3 | Status: SHIPPED | OUTPATIENT
Start: 2022-11-09

## 2022-11-09 NOTE — TELEPHONE ENCOUNTER
Pharmacy requesting refill of Donepezil.       Medication active on med list yes      Date of last fill: 12/1/21 for #90 and 3 refills  verified on 11/9/2022    verified by Hector Sanchez LPN      Date of last appointment: 4/6/2022    Next Visit Date: 2/14/2023

## 2022-11-10 DIAGNOSIS — M47.816 LUMBAR SPONDYLOSIS: ICD-10-CM

## 2022-11-10 RX ORDER — BACLOFEN 10 MG/1
10 TABLET ORAL 2 TIMES DAILY
Qty: 60 TABLET | Refills: 3 | Status: SHIPPED | OUTPATIENT
Start: 2022-11-10 | End: 2023-01-09

## 2022-11-17 ENCOUNTER — OFFICE VISIT (OUTPATIENT)
Dept: FAMILY MEDICINE CLINIC | Age: 77
End: 2022-11-17
Payer: MEDICARE

## 2022-11-17 VITALS
TEMPERATURE: 97.4 F | SYSTOLIC BLOOD PRESSURE: 110 MMHG | HEIGHT: 71 IN | HEART RATE: 68 BPM | WEIGHT: 173 LBS | RESPIRATION RATE: 16 BRPM | BODY MASS INDEX: 24.22 KG/M2 | DIASTOLIC BLOOD PRESSURE: 60 MMHG | OXYGEN SATURATION: 97 %

## 2022-11-17 DIAGNOSIS — H91.93 HEARING DECREASED, BILATERAL: ICD-10-CM

## 2022-11-17 DIAGNOSIS — E11.9 TYPE 2 DIABETES MELLITUS WITHOUT COMPLICATION, WITHOUT LONG-TERM CURRENT USE OF INSULIN (HCC): ICD-10-CM

## 2022-11-17 DIAGNOSIS — E11.40 TYPE 2 DIABETES MELLITUS WITH DIABETIC NEUROPATHY, WITHOUT LONG-TERM CURRENT USE OF INSULIN (HCC): Primary | ICD-10-CM

## 2022-11-17 DIAGNOSIS — Z23 NEED FOR INFLUENZA VACCINATION: ICD-10-CM

## 2022-11-17 DIAGNOSIS — Z91.81 AT HIGH RISK FOR FALLS: ICD-10-CM

## 2022-11-17 DIAGNOSIS — E78.5 HYPERLIPIDEMIA, UNSPECIFIED HYPERLIPIDEMIA TYPE: ICD-10-CM

## 2022-11-17 DIAGNOSIS — Z12.5 PROSTATE CANCER SCREENING: ICD-10-CM

## 2022-11-17 DIAGNOSIS — Z00.00 MEDICARE ANNUAL WELLNESS VISIT, SUBSEQUENT: ICD-10-CM

## 2022-11-17 DIAGNOSIS — I10 HYPERTENSION, UNSPECIFIED TYPE: ICD-10-CM

## 2022-11-17 DIAGNOSIS — K21.9 GASTROESOPHAGEAL REFLUX DISEASE WITHOUT ESOPHAGITIS: ICD-10-CM

## 2022-11-17 PROCEDURE — G8484 FLU IMMUNIZE NO ADMIN: HCPCS | Performed by: NURSE PRACTITIONER

## 2022-11-17 PROCEDURE — G0439 PPPS, SUBSEQ VISIT: HCPCS | Performed by: NURSE PRACTITIONER

## 2022-11-17 PROCEDURE — 3044F HG A1C LEVEL LT 7.0%: CPT | Performed by: NURSE PRACTITIONER

## 2022-11-17 PROCEDURE — G0008 ADMIN INFLUENZA VIRUS VAC: HCPCS | Performed by: NURSE PRACTITIONER

## 2022-11-17 PROCEDURE — 90694 VACC AIIV4 NO PRSRV 0.5ML IM: CPT | Performed by: NURSE PRACTITIONER

## 2022-11-17 PROCEDURE — 1123F ACP DISCUSS/DSCN MKR DOCD: CPT | Performed by: NURSE PRACTITIONER

## 2022-11-17 PROCEDURE — 3078F DIAST BP <80 MM HG: CPT | Performed by: NURSE PRACTITIONER

## 2022-11-17 PROCEDURE — 3074F SYST BP LT 130 MM HG: CPT | Performed by: NURSE PRACTITIONER

## 2022-11-17 ASSESSMENT — LIFESTYLE VARIABLES
HOW MANY STANDARD DRINKS CONTAINING ALCOHOL DO YOU HAVE ON A TYPICAL DAY: PATIENT DOES NOT DRINK
HOW OFTEN DO YOU HAVE A DRINK CONTAINING ALCOHOL: NEVER

## 2022-11-17 ASSESSMENT — PATIENT HEALTH QUESTIONNAIRE - PHQ9
SUM OF ALL RESPONSES TO PHQ QUESTIONS 1-9: 0
SUM OF ALL RESPONSES TO PHQ QUESTIONS 1-9: 0
2. FEELING DOWN, DEPRESSED OR HOPELESS: 0
1. LITTLE INTEREST OR PLEASURE IN DOING THINGS: 0
SUM OF ALL RESPONSES TO PHQ QUESTIONS 1-9: 0
SUM OF ALL RESPONSES TO PHQ QUESTIONS 1-9: 0
SUM OF ALL RESPONSES TO PHQ9 QUESTIONS 1 & 2: 0

## 2022-11-17 NOTE — PATIENT INSTRUCTIONS
Personalized Preventive Plan for Daniel Carlin - 11/17/2022  Medicare offers a range of preventive health benefits. Some of the tests and screenings are paid in full while other may be subject to a deductible, co-insurance, and/or copay. Some of these benefits include a comprehensive review of your medical history including lifestyle, illnesses that may run in your family, and various assessments and screenings as appropriate. After reviewing your medical record and screening and assessments performed today your provider may have ordered immunizations, labs, imaging, and/or referrals for you. A list of these orders (if applicable) as well as your Preventive Care list are included within your After Visit Summary for your review. Other Preventive Recommendations:    A preventive eye exam performed by an eye specialist is recommended every 1-2 years to screen for glaucoma; cataracts, macular degeneration, and other eye disorders. A preventive dental visit is recommended every 6 months. Try to get at least 150 minutes of exercise per week or 10,000 steps per day on a pedometer . Order or download the FREE \"Exercise & Physical Activity: Your Everyday Guide\" from [employer]. Call 3-258.822.2249 or search [employer] online. You need 9774-3316 mg of calcium and 0465-7589 IU of vitamin D per day. It is possible to meet your calcium requirement with diet alone, but a vitamin D supplement is usually necessary to meet this goal.  When exposed to the sun, use a sunscreen that protects against both UVA and UVB radiation with an SPF of 30 or greater. Reapply every 2 to 3 hours or after sweating, drying off with a towel, or swimming. Always wear a seat belt when traveling in a car. Always wear a helmet when riding a bicycle or motorcycle. Azathioprine Counseling:  I discussed with the patient the risks of azathioprine including but not limited to myelosuppression, immunosuppression, hepatotoxicity, lymphoma, and infections.  The patient understands that monitoring is required including baseline LFTs, Creatinine, possible TPMP genotyping and weekly CBCs for the first month and then every 2 weeks thereafter.  The patient verbalized understanding of the proper use and possible adverse effects of azathioprine.  All of the patient's questions and concerns were addressed.

## 2022-11-17 NOTE — PROGRESS NOTES
Medicare Annual Wellness Visit    Tim Santos is here for Medicare AWV    Assessment & Plan   Need for influenza vaccination  -     Influenza, FLUAD, (age 72 y+), IM, Preservative Free, 0.5 mL  Medicare annual wellness visit, subsequent    Recommendations for Preventive Services Due: see orders and patient instructions/AVS.  Recommended screening schedule for the next 5-10 years is provided to the patient in written form: see Patient Instructions/AVS.     Return for Medicare Annual Wellness Visit in 1 year. Subjective       76. Y.o for follow up. Current complaints include progressive memory decline. Reports that for the past several years he has noticed slightly worsening memory. Had been referred to neurology, MRI negative. aricept, increased to 10 mg, memory stable, seeing neuro in feb     History of type 2 diabetes takes Januvia 100, metformin 2000, glimepiride 4 mg daily. Last A1c 6.8, today 6.6, not yet due for a1c     History hypertension takes amlodpine 2.5 mg, bp well controlled today     History hyperlipidemia takes Zocor 20 mg, last lipid stable due for rechecl     History of generalized arthritis and neuropathy takes gabapentin 300 twice daily and meloxicam 7.5. Ongoing pain lower back, imaging showing degeneration. Had been doing pt, difficult to make appt with son illness. Gerd, omeprazole 40 mg      Urinary frequency and hesitancy, incomplete emptying. Had trialed flomax , proscar without improvement. Last PSA had improved. Seeieng some nocturia. Patient's complete Health Risk Assessment and screening values have been reviewed and are found in Flowsheets. The following problems were reviewed today and where indicated follow up appointments were made and/or referrals ordered.     Positive Risk Factor Screenings with Interventions:    Fall Risk:  Do you feel unsteady or are you worried about falling? : (!) yes  2 or more falls in past year?: (!) yes  Fall with injury in past year?: no   Fall Risk Interventions:    Home safety tips provided            General Health and ACP:  General  In general, how would you say your health is?: Fair  In the past 7 days, have you experienced any of the following: New or Increased Pain, New or Increased Fatigue, Loneliness, Social Isolation, Stress or Anger?: No  Do you get the social and emotional support that you need?: Yes  Do you have a Living Will?: Yes    Advance Directives       Power of  Living Will ACP-Advance Directive ACP-Power of     Not on File Not on File Not on File Not on File          General Health Risk Interventions:  No Living Will: Patient declines ACP discussion/assistance    Health Habits/Nutrition:  Physical Activity: Inactive    Days of Exercise per Week: 0 days    Minutes of Exercise per Session: 0 min     Have you lost any weight without trying in the past 3 months?: (!) Yes  Body mass index: 24.47  Have you seen the dentist within the past year?: N/A - wear dentures  Health Habits/Nutrition Interventions:  Inadequate physical activity:  patient agrees to exercise for at least 150 minutes/week    Hearing/Vision:  Do you or your family notice any trouble with your hearing that hasn't been managed with hearing aids?: (!) Yes  Do you have difficulty driving, watching TV, or doing any of your daily activities because of your eyesight?: No  Have you had an eye exam within the past year?: Yes  No results found. Hearing/Vision Interventions:  Hearing concerns:  audiology referral provided            Objective   Vitals:    11/17/22 1429   BP: 110/60   Site: Right Upper Arm   Position: Sitting   Cuff Size: Medium Adult   Pulse: 68   Resp: 16   Temp: 97.4 °F (36.3 °C)   TempSrc: Tympanic   SpO2: 97%   Weight: 173 lb (78.5 kg)   Height: 5' 10.5\" (1.791 m)      Body mass index is 24.47 kg/m². No Known Allergies  Prior to Visit Medications    Medication Sig Taking?  Authorizing Provider   baclofen (LIORESAL) 10 MG tablet Take 1 tablet by mouth 2 times daily Yes CRISTINA Hastings CNP   amLODIPine (NORVASC) 2.5 MG tablet TAKE 1 TABLET DAILY Yes CRISTINA Hastings CNP   meloxicam (MOBIC) 7.5 MG tablet TAKE 1 TABLET DAILY Yes CRISTINA Hastings CNP   donepezil (ARICEPT) 10 MG tablet TAKE 1 TABLET NIGHTLY Yes CRISTINA Oneal CNP   finasteride (PROSCAR) 5 MG tablet TAKE 1 TABLET DAILY Yes CRISTINA Hastings CNP   tamsulosin (FLOMAX) 0.4 MG capsule Take 1 capsule by mouth in the morning and at bedtime Yes CRISTINA Hastings CNP   omeprazole (PRILOSEC) 40 MG delayed release capsule TAKE 1 CAPSULE EVERY MORNING BEFORE BREAKFAST Yes CRISTINA Hastings CNP   gabapentin (NEURONTIN) 300 MG capsule TAKE 1 CAPSULE TWICE A DAY Yes CRISTINA Hastings CNP   glimepiride (AMARYL) 2 MG tablet TAKE 2 TABLETS EVERY MORNING Yes CRISTINA Hastings CNP   sitaGLIPtan-metFORMIN (JANUMET)  MG per tablet Janumet  MG Oral Tablet TAKE 1 TABLET TWICE DAILY WITH MEALS.   Refills: 0 Active Yes CRISTINA Hastings CNP   simvastatin (ZOCOR) 20 MG tablet TAKE 1 TABLET NIGHTLY Yes CRISTINA Hastings CNP   ondansetron (ZOFRAN) 4 MG tablet Take 1 tablet by mouth 3 times daily as needed for Nausea or Vomiting Yes CRISTINA Hastings CNP       CareTeam (Including outside providers/suppliers regularly involved in providing care):   Patient Care Team:  CRISTINA Hastings CNP as PCP - General (Certified Nurse Practitioner)  CRISTINA Hastings CNP as PCP - [facility]  Mckenzie Drew DPM as Physician (Podiatry)     Reviewed and updated this visit:  Tobacco  Allergies  Meds  Med Hx  Surg Hx  Soc Hx  Fam Hx

## 2022-11-17 NOTE — PROGRESS NOTES
Visit Information    Have you changed or started any medications since your last visit including any over-the-counter medicines, vitamins, or herbal medicines? no   Are you having any side effects from any of your medications? -  no  Have you stopped taking any of your medications? Is so, why? -  no    Have you seen any other physician or provider since your last visit? No  Have you had any other diagnostic tests since your last visit? No  Have you been seen in the emergency room and/or had an admission to a hospital since we last saw you? No  Have you had your routine dental cleaning in the past 6 months? yes -     Have you activated your Interactive F[employer] account? If not, what are your barriers?  No:      Patient Care Team:  CRISTINA Eldridge CNP as PCP - General (Certified Nurse Practitioner)  CRISTINA Eldridge CNP as PCP - [facility]  Arnulfo Gama DPM as Physician (Podiatry)    Medical History Review  Past Medical, Family, and Social History reviewed and does contribute to the patient presenting condition    Health Maintenance   Topic Date Due    DTaP/Tdap/Td vaccine (1 - Tdap) Never done    Shingles vaccine (1 of 2) Never done    COVID-19 Vaccine (3 - Booster for [employer] series) 04/23/2021    Annual Wellness Visit (AWV)  03/18/2022    Flu vaccine (1) 08/01/2022    Lipids  03/16/2023    Depression Screen  08/17/2023    Pneumococcal 65+ years Vaccine  Completed    Hepatitis A vaccine  Aged Out    Hib vaccine  Aged Out    Meningococcal (ACWY) vaccine  Aged Out    Hepatitis C screen  Discontinued

## 2023-02-21 ENCOUNTER — OFFICE VISIT (OUTPATIENT)
Dept: FAMILY MEDICINE CLINIC | Age: 78
End: 2023-02-21

## 2023-02-21 VITALS
OXYGEN SATURATION: 98 % | RESPIRATION RATE: 16 BRPM | BODY MASS INDEX: 24.92 KG/M2 | HEART RATE: 56 BPM | WEIGHT: 178 LBS | TEMPERATURE: 97.8 F | DIASTOLIC BLOOD PRESSURE: 80 MMHG | HEIGHT: 71 IN | SYSTOLIC BLOOD PRESSURE: 130 MMHG

## 2023-02-21 DIAGNOSIS — K21.9 GASTROESOPHAGEAL REFLUX DISEASE WITHOUT ESOPHAGITIS: ICD-10-CM

## 2023-02-21 DIAGNOSIS — E11.40 TYPE 2 DIABETES MELLITUS WITH DIABETIC NEUROPATHY, WITHOUT LONG-TERM CURRENT USE OF INSULIN (HCC): ICD-10-CM

## 2023-02-21 DIAGNOSIS — Z12.5 PROSTATE CANCER SCREENING: ICD-10-CM

## 2023-02-21 DIAGNOSIS — E78.5 HYPERLIPIDEMIA, UNSPECIFIED HYPERLIPIDEMIA TYPE: ICD-10-CM

## 2023-02-21 DIAGNOSIS — M47.816 LUMBAR SPONDYLOSIS: ICD-10-CM

## 2023-02-21 DIAGNOSIS — L29.9 SCALP ITCH: ICD-10-CM

## 2023-02-21 DIAGNOSIS — I10 HYPERTENSION, UNSPECIFIED TYPE: ICD-10-CM

## 2023-02-21 DIAGNOSIS — E11.9 TYPE 2 DIABETES MELLITUS WITHOUT COMPLICATION, WITHOUT LONG-TERM CURRENT USE OF INSULIN (HCC): Primary | ICD-10-CM

## 2023-02-21 LAB — HBA1C MFR BLD: 5.9 %

## 2023-02-21 RX ORDER — TRIAMCINOLONE ACETONIDE 0.25 MG/G
CREAM TOPICAL
Qty: 45 G | Refills: 1 | Status: SHIPPED | OUTPATIENT
Start: 2023-02-21

## 2023-02-21 SDOH — ECONOMIC STABILITY: FOOD INSECURITY: WITHIN THE PAST 12 MONTHS, YOU WORRIED THAT YOUR FOOD WOULD RUN OUT BEFORE YOU GOT MONEY TO BUY MORE.: NEVER TRUE

## 2023-02-21 SDOH — ECONOMIC STABILITY: HOUSING INSECURITY
IN THE LAST 12 MONTHS, WAS THERE A TIME WHEN YOU DID NOT HAVE A STEADY PLACE TO SLEEP OR SLEPT IN A SHELTER (INCLUDING NOW)?: NO

## 2023-02-21 SDOH — ECONOMIC STABILITY: FOOD INSECURITY: WITHIN THE PAST 12 MONTHS, THE FOOD YOU BOUGHT JUST DIDN'T LAST AND YOU DIDN'T HAVE MONEY TO GET MORE.: NEVER TRUE

## 2023-02-21 SDOH — ECONOMIC STABILITY: INCOME INSECURITY: HOW HARD IS IT FOR YOU TO PAY FOR THE VERY BASICS LIKE FOOD, HOUSING, MEDICAL CARE, AND HEATING?: NOT HARD AT ALL

## 2023-02-21 ASSESSMENT — ENCOUNTER SYMPTOMS
SINUS PAIN: 0
SHORTNESS OF BREATH: 0
COUGH: 0
SORE THROAT: 0
DIARRHEA: 0
VOMITING: 0
ABDOMINAL PAIN: 0
NAUSEA: 0
EYE PAIN: 0
BACK PAIN: 0

## 2023-02-21 ASSESSMENT — PATIENT HEALTH QUESTIONNAIRE - PHQ9
SUM OF ALL RESPONSES TO PHQ QUESTIONS 1-9: 0
1. LITTLE INTEREST OR PLEASURE IN DOING THINGS: 0
2. FEELING DOWN, DEPRESSED OR HOPELESS: 0
SUM OF ALL RESPONSES TO PHQ QUESTIONS 1-9: 0
SUM OF ALL RESPONSES TO PHQ9 QUESTIONS 1 & 2: 0
SUM OF ALL RESPONSES TO PHQ QUESTIONS 1-9: 0
SUM OF ALL RESPONSES TO PHQ QUESTIONS 1-9: 0

## 2023-02-21 NOTE — PATIENT INSTRUCTIONS
Patient Education        High Blood Pressure: Care Instructions  Overview     It's normal for blood pressure to go up and down throughout the day. But if it stays up, you have high blood pressure. Another name for high blood pressure is hypertension. Despite what a lot of people think, high blood pressure usually doesn't cause headaches or make you feel dizzy or lightheaded. It usually has no symptoms. But it does increase your risk of stroke, heart attack, and other problems. You and your doctor will talk about your risks of these problems based on your blood pressure. Your doctor will give you a goal for your blood pressure. Your goal will be based on your health and your age. Lifestyle changes, such as eating healthy and being active, are always important to help lower blood pressure. You might also take medicine to reach your blood pressure goal.  Follow-up care is a key part of your treatment and safety. Be sure to make and go to all appointments, and call your doctor if you are having problems. It's also a good idea to know your test results and keep a list of the medicines you take. How can you care for yourself at home? Medical treatment  If you stop taking your medicine, your blood pressure will go back up. You may take one or more types of medicine to lower your blood pressure. Be safe with medicines. Take your medicine exactly as prescribed. Call your doctor if you think you are having a problem with your medicine. Talk to your doctor before you start taking aspirin every day. Aspirin can help certain people lower their risk of a heart attack or stroke. But taking aspirin isn't right for everyone, because it can cause serious bleeding. See your doctor regularly. You may need to see the doctor more often at first or until your blood pressure comes down. If you are taking blood pressure medicine, talk to your doctor before you take decongestants or anti-inflammatory medicine, such as ibuprofen.  Some of these medicines can raise blood pressure. Learn how to check your blood pressure at home. Lifestyle changes  Stay at a healthy weight. This is especially important if you put on weight around the waist. Losing even 10 pounds can help you lower your blood pressure. If your doctor recommends it, get more exercise. Walking is a good choice. Bit by bit, increase the amount you walk every day. Try for at least 30 minutes on most days of the week. You also may want to swim, bike, or do other activities. Avoid or limit alcohol. Talk to your doctor about whether you can drink any alcohol. Try to limit how much sodium you eat to less than 2,300 milligrams (mg) a day. Your doctor may ask you to try to eat less than 1,500 mg a day. Eat plenty of fruits (such as bananas and oranges), vegetables, legumes, whole grains, and low-fat dairy products. Lower the amount of saturated fat in your diet. Saturated fat is found in animal products such as milk, cheese, and meat. Limiting these foods may help you lose weight and also lower your risk for heart disease. Do not smoke. Smoking increases your risk for heart attack and stroke. If you need help quitting, talk to your doctor about stop-smoking programs and medicines. These can increase your chances of quitting for good. When should you call for help? Call 911  anytime you think you may need emergency care. This may mean having symptoms that suggest that your blood pressure is causing a serious heart or blood vessel problem. Your blood pressure may be over 180/120. For example, call 911 if:    You have symptoms of a heart attack. These may include:  Chest pain or pressure, or a strange feeling in the chest.  Sweating. Shortness of breath. Nausea or vomiting. Pain, pressure, or a strange feeling in the back, neck, jaw, or upper belly or in one or both shoulders or arms. Lightheadedness or sudden weakness. A fast or irregular heartbeat.      You have symptoms of a stroke. These may include:  Sudden numbness, tingling, weakness, or loss of movement in your face, arm, or leg, especially on only one side of your body. Sudden vision changes. Sudden trouble speaking. Sudden confusion or trouble understanding simple statements. Sudden problems with walking or balance. A sudden, severe headache that is different from past headaches. You have severe back or belly pain. Do not wait until your blood pressure comes down on its own. Get help right away. Call your doctor now or seek immediate care if:    Your blood pressure is much higher than normal (such as 180/120 or higher), but you don't have symptoms. You think high blood pressure is causing symptoms, such as:  Severe headache. Blurry vision. Watch closely for changes in your health, and be sure to contact your doctor if:    Your blood pressure measures higher than your doctor recommends at least 2 times. That means the top number is higher or the bottom number is higher, or both. You think you may be having side effects from your blood pressure medicine. Where can you learn more? Go to http://www.woods.com/ and enter G123 to learn more about \"High Blood Pressure: Care Instructions. \"  Current as of: October 6, 2021               Content Version: 13.5  © 2006-2022 [employer]. Care instructions adapted under license by [facility] [employer] [facility] ([facility]). If you have questions about a medical condition or this instruction, always ask your healthcare professional. [place] [address] any warranty or liability for your use of this information.

## 2023-02-21 NOTE — PROGRESS NOTES
[facility]  [address] [address]  Dept: [phone]  Dept Fax: [phone]    Sunni Duran is a 68 y.o. male who presents today for his medicalconditions/complaints as noted below. Sunni Duran is c/o of Diabetes      HPI:     68 Y.o for follow up. Reports that for the past several years he has noticed slightly worsening memory. Had been referred to neurology, MRI negative. aricept, increased to 10 mg, memory stable,     History of type 2 diabetes takes Januvia 100, metformin 2000, glimepiride 4 mg daily. Last A1c 6.6, today 5.9. No hypo episodes reported. History hypertension takes amlodpine 2.5 mg, bp well controlled today     History hyperlipidemia takes Zocor 20 mg, last lipid stable due for recheck     History of generalized arthritis and neuropathy takes gabapentin 300 twice daily and meloxicam 7.5. Ongoing pain lower back, imaging showing degeneration. Had been doing pt, difficult to make appt with son illness. Gerd, omeprazole 40 mg      Urinary frequency and hesitancy, incomplete emptying. Had trialed flomax , proscar without improvement. Last PSA had improved. Seeieng some nocturia. Past Medical History:   Diagnosis Date    Depression     Diabetes mellitus (HCC)     Hypertension     Neuropathy         Current Outpatient Medications   Medication Sig Dispense Refill    triamcinolone (KENALOG) 0.025 % cream Apply topically 2 times daily.  45 g 1    amLODIPine (NORVASC) 2.5 MG tablet TAKE 1 TABLET DAILY 90 tablet 3    meloxicam (MOBIC) 7.5 MG tablet TAKE 1 TABLET DAILY 90 tablet 3    donepezil (ARICEPT) 10 MG tablet TAKE 1 TABLET NIGHTLY 90 tablet 1    finasteride (PROSCAR) 5 MG tablet TAKE 1 TABLET DAILY 90 tablet 1    tamsulosin (FLOMAX) 0.4 MG capsule Take 1 capsule by mouth in the morning and at bedtime 180 capsule 1    omeprazole (PRILOSEC) 40 MG delayed release capsule TAKE 1 CAPSULE EVERY MORNING BEFORE BREAKFAST 90 capsule 1    gabapentin (NEURONTIN) 300 MG capsule TAKE 1 CAPSULE TWICE A  capsule 3    sitaGLIPtan-metFORMIN (JANUMET)  MG per tablet Janumet  MG Oral Tablet TAKE 1 TABLET TWICE DAILY WITH MEALS. Refills: 0 Active 180 tablet 5    simvastatin (ZOCOR) 20 MG tablet TAKE 1 TABLET NIGHTLY 90 tablet 3    ondansetron (ZOFRAN) 4 MG tablet Take 1 tablet by mouth 3 times daily as needed for Nausea or Vomiting 15 tablet 0     No current facility-administered medications for this visit. No Known Allergies    Subjective:      Review of Systems   Constitutional:  Negative for chills and fatigue. HENT:  Negative for congestion, ear pain, sinus pain and sore throat. Eyes:  Negative for pain and visual disturbance. Respiratory:  Negative for cough and shortness of breath. Cardiovascular:  Negative for chest pain and palpitations. Gastrointestinal:  Negative for abdominal pain, diarrhea, nausea and vomiting. Genitourinary:  Negative for penile pain and testicular pain. Musculoskeletal:  Negative for back pain, joint swelling and neck pain. Skin:  Negative for rash. Neurological:  Negative for dizziness and light-headedness. Hematological:  Does not bruise/bleed easily. All other systems reviewed and are negative.    :Objective     Physical Exam  Vitals and nursing note reviewed. Constitutional:       General: He is not in acute distress. Appearance: Normal appearance. He is not toxic-appearing. Cardiovascular:      Rate and Rhythm: Normal rate. Pulmonary:      Effort: Pulmonary effort is normal.      Breath sounds: Normal breath sounds. Skin:     General: Skin is warm and dry. Neurological:      General: No focal deficit present. Mental Status: He is alert and oriented to person, place, and time.      /80 (Site: Right Upper Arm, Position: Sitting, Cuff Size: Large Adult)   Pulse 56   Temp 97.8 °F (36.6 °C) (Tympanic)   Resp 16   Ht 5' 11\" (1.803 m)   Wt 178 lb (80.7 kg)   SpO2 98%   BMI 24.83 kg/m²     Lab Review   No visits with results within 6 Month(s) from this visit. Latest known visit with results is:   Office Visit on 08/17/2022   Component Date Value    Hemoglobin A1C 08/17/2022 6.6     Glucose 08/17/2022 178     QC OK? 08/17/2022 Present        Assessment and Plan      1. Type 2 diabetes mellitus without complication, without long-term current use of insulin (HCC)  -     POCT glycosylated hemoglobin (Hb A1C)  -     CBC; Future  2. Hyperlipidemia, unspecified hyperlipidemia type  -     CBC; Future  -     Comprehensive Metabolic Panel; Future  -     Lipid Panel; Future  3. Type 2 diabetes mellitus with diabetic neuropathy, without long-term current use of insulin (HCC)  -     CBC; Future  4. Hypertension, unspecified type  -     CBC; Future  -     Comprehensive Metabolic Panel; Future  -     TSH With Reflex Ft4; Future  5. Prostate cancer screening  -     CBC; Future  -     PSA Screening; Future  6. Gastroesophageal reflux disease without esophagitis  -     CBC; Future  7. Lumbar spondylosis  -     CBC; Future  8. Scalp itch  -     triamcinolone (KENALOG) 0.025 % cream; Apply topically 2 times daily. , Disp-45 g, R-1, Normal         a1c improved, under 6  Glimepiride discontinued over risk of hypo episodes  Labs ordered  Med check 3 months, sooner prn        Results for orders placed or performed in visit on 02/21/23   POCT glycosylated hemoglobin (Hb A1C)   Result Value Ref Range    Hemoglobin A1C 5.9 %             Return in about 3 months (around 5/21/2023), or if symptoms worsen or fail to improve. Orders Placed This Encounter   Medications    triamcinolone (KENALOG) 0.025 % cream     Sig: Apply topically 2 times daily. Dispense:  45 g     Refill:  1        Patient given educational materials - see patient instructions. Discussed use, benefit, and side effects of prescribed medications.   All patientquestions answered. Pt voiced understanding. Patient given educational materials - see patient instructions. Discussed use, benefit, and side effects of prescribed medications. All patientquestions answered. Pt voiced understanding. This note was transcribed using dictation with Dragon services. Efforts were made to correct any errors but some words may be misinterpreted.     Patient assumes risks associated with failure to complete recommended testing and treatments in a timely manner    Electronically signed by CRISTINA Chao CNP on 2/21/2023at 2:42 PM

## 2023-02-21 NOTE — PROGRESS NOTES
Visit Information    Have you changed or started any medications since your last visit including any over-the-counter medicines, vitamins, or herbal medicines? no   Are you having any side effects from any of your medications? -  no  Have you stopped taking any of your medications? Is so, why? -  no    Have you seen any other physician or provider since your last visit? No  Have you had any other diagnostic tests since your last visit? No  Have you been seen in the emergency room and/or had an admission to a hospital since we last saw you? No  Have you had your routine dental cleaning in the past 6 months? no    Have you activated your [employer] account? If not, what are your barriers?  No:      Patient Care Team:  CRISTINA Mccabe CNP as PCP - General (Certified Nurse Practitioner)  CRISTINA Mccabe CNP as PCP - Empaneled Provider  Annetta Do DPM as Physician (Podiatry)    Medical History Review  Past Medical, Family, and Social History reviewed and does contribute to the patient presenting condition    Health Maintenance   Topic Date Due    DTaP/Tdap/Td vaccine (1 - Tdap) Never done    Shingles vaccine (1 of 2) Never done    COVID-19 Vaccine (3 - Booster for Yoav Daubs series) 04/23/2021    Lipids  03/16/2023    Depression Screen  11/17/2023    Annual Wellness Visit (AWV)  11/18/2023    Flu vaccine  Completed    Pneumococcal 65+ years Vaccine  Completed    Hepatitis A vaccine  Aged Out    Hib vaccine  Aged Out    Meningococcal (ACWY) vaccine  Aged Out    Hepatitis C screen  Discontinued

## 2023-04-04 DIAGNOSIS — Z12.5 PROSTATE CANCER SCREENING: ICD-10-CM

## 2023-04-05 RX ORDER — TAMSULOSIN HYDROCHLORIDE 0.4 MG/1
CAPSULE ORAL
Qty: 180 CAPSULE | Refills: 3 | Status: SHIPPED | OUTPATIENT
Start: 2023-04-05

## 2023-06-27 ENCOUNTER — OFFICE VISIT (OUTPATIENT)
Dept: NEUROLOGY | Age: 78
End: 2023-06-27
Payer: MEDICARE

## 2023-06-27 VITALS
DIASTOLIC BLOOD PRESSURE: 63 MMHG | BODY MASS INDEX: 25.54 KG/M2 | WEIGHT: 182.4 LBS | HEART RATE: 64 BPM | SYSTOLIC BLOOD PRESSURE: 120 MMHG | HEIGHT: 71 IN | OXYGEN SATURATION: 97 %

## 2023-06-27 DIAGNOSIS — M79.2 NEUROPATHIC PAIN: ICD-10-CM

## 2023-06-27 DIAGNOSIS — E11.42 DIABETIC POLYNEUROPATHY ASSOCIATED WITH TYPE 2 DIABETES MELLITUS (HCC): ICD-10-CM

## 2023-06-27 DIAGNOSIS — M62.838 MUSCLE SPASM OF BOTH LOWER LEGS: ICD-10-CM

## 2023-06-27 DIAGNOSIS — M48.02 CERVICAL STENOSIS OF SPINAL CANAL: ICD-10-CM

## 2023-06-27 DIAGNOSIS — G31.84 MILD COGNITIVE IMPAIRMENT: Primary | ICD-10-CM

## 2023-06-27 PROCEDURE — G8427 DOCREV CUR MEDS BY ELIG CLIN: HCPCS | Performed by: PSYCHIATRY & NEUROLOGY

## 2023-06-27 PROCEDURE — 1036F TOBACCO NON-USER: CPT | Performed by: PSYCHIATRY & NEUROLOGY

## 2023-06-27 PROCEDURE — 3078F DIAST BP <80 MM HG: CPT | Performed by: PSYCHIATRY & NEUROLOGY

## 2023-06-27 PROCEDURE — G8417 CALC BMI ABV UP PARAM F/U: HCPCS | Performed by: PSYCHIATRY & NEUROLOGY

## 2023-06-27 PROCEDURE — 1123F ACP DISCUSS/DSCN MKR DOCD: CPT | Performed by: PSYCHIATRY & NEUROLOGY

## 2023-06-27 PROCEDURE — 3074F SYST BP LT 130 MM HG: CPT | Performed by: PSYCHIATRY & NEUROLOGY

## 2023-06-27 PROCEDURE — 3044F HG A1C LEVEL LT 7.0%: CPT | Performed by: PSYCHIATRY & NEUROLOGY

## 2023-06-27 PROCEDURE — 99215 OFFICE O/P EST HI 40 MIN: CPT | Performed by: PSYCHIATRY & NEUROLOGY

## 2023-06-27 RX ORDER — GABAPENTIN 400 MG/1
CAPSULE ORAL
Qty: 180 CAPSULE | Refills: 1 | Status: SHIPPED | OUTPATIENT
Start: 2023-06-27 | End: 2024-06-24

## 2023-06-27 RX ORDER — GLIMEPIRIDE 2 MG/1
TABLET ORAL
COMMUNITY
Start: 2023-05-04

## 2023-06-27 RX ORDER — DONEPEZIL HYDROCHLORIDE 10 MG/1
TABLET, FILM COATED ORAL
Qty: 180 TABLET | Refills: 1 | Status: SHIPPED | OUTPATIENT
Start: 2023-06-27

## 2023-06-27 RX ORDER — TIZANIDINE 2 MG/1
TABLET ORAL
Qty: 30 TABLET | Refills: 1 | Status: SHIPPED | OUTPATIENT
Start: 2023-06-27

## 2023-07-17 DIAGNOSIS — K21.9 GASTROESOPHAGEAL REFLUX DISEASE WITHOUT ESOPHAGITIS: ICD-10-CM

## 2023-07-17 RX ORDER — GLIMEPIRIDE 2 MG/1
2 TABLET ORAL
Qty: 90 TABLET | Refills: 1 | OUTPATIENT
Start: 2023-07-17

## 2023-07-17 RX ORDER — OMEPRAZOLE 40 MG/1
CAPSULE, DELAYED RELEASE ORAL
Qty: 90 CAPSULE | Refills: 1 | OUTPATIENT
Start: 2023-07-17

## 2023-07-31 DIAGNOSIS — M62.838 MUSCLE SPASM OF BOTH LOWER LEGS: ICD-10-CM

## 2023-07-31 DIAGNOSIS — E11.42 DIABETIC POLYNEUROPATHY ASSOCIATED WITH TYPE 2 DIABETES MELLITUS (HCC): ICD-10-CM

## 2023-07-31 DIAGNOSIS — M79.2 NEUROPATHIC PAIN: ICD-10-CM

## 2023-08-01 RX ORDER — TIZANIDINE 2 MG/1
TABLET ORAL
Qty: 30 TABLET | Refills: 1 | OUTPATIENT
Start: 2023-08-01

## 2023-09-26 DIAGNOSIS — I10 HYPERTENSION, UNSPECIFIED TYPE: ICD-10-CM

## 2023-09-26 DIAGNOSIS — E87.5 HYPERKALEMIA: ICD-10-CM

## 2023-09-26 RX ORDER — MELOXICAM 7.5 MG/1
7.5 TABLET ORAL DAILY
Qty: 90 TABLET | Refills: 3 | OUTPATIENT
Start: 2023-09-26

## 2023-09-26 RX ORDER — AMLODIPINE BESYLATE 2.5 MG/1
2.5 TABLET ORAL DAILY
Qty: 90 TABLET | Refills: 3 | OUTPATIENT
Start: 2023-09-26

## 2023-11-06 ENCOUNTER — OFFICE VISIT (OUTPATIENT)
Dept: NEUROLOGY | Age: 78
End: 2023-11-06
Payer: MEDICARE

## 2023-11-06 VITALS
WEIGHT: 183.4 LBS | DIASTOLIC BLOOD PRESSURE: 59 MMHG | BODY MASS INDEX: 26.26 KG/M2 | HEIGHT: 70 IN | HEART RATE: 72 BPM | SYSTOLIC BLOOD PRESSURE: 121 MMHG

## 2023-11-06 DIAGNOSIS — M79.2 NEUROPATHIC PAIN: ICD-10-CM

## 2023-11-06 DIAGNOSIS — G31.84 MILD COGNITIVE IMPAIRMENT: Primary | ICD-10-CM

## 2023-11-06 DIAGNOSIS — E11.42 DIABETIC POLYNEUROPATHY ASSOCIATED WITH TYPE 2 DIABETES MELLITUS (HCC): ICD-10-CM

## 2023-11-06 PROCEDURE — 1123F ACP DISCUSS/DSCN MKR DOCD: CPT | Performed by: PSYCHIATRY & NEUROLOGY

## 2023-11-06 PROCEDURE — 1036F TOBACCO NON-USER: CPT | Performed by: PSYCHIATRY & NEUROLOGY

## 2023-11-06 PROCEDURE — G8484 FLU IMMUNIZE NO ADMIN: HCPCS | Performed by: PSYCHIATRY & NEUROLOGY

## 2023-11-06 PROCEDURE — 3078F DIAST BP <80 MM HG: CPT | Performed by: PSYCHIATRY & NEUROLOGY

## 2023-11-06 PROCEDURE — G8417 CALC BMI ABV UP PARAM F/U: HCPCS | Performed by: PSYCHIATRY & NEUROLOGY

## 2023-11-06 PROCEDURE — 99214 OFFICE O/P EST MOD 30 MIN: CPT | Performed by: PSYCHIATRY & NEUROLOGY

## 2023-11-06 PROCEDURE — 3044F HG A1C LEVEL LT 7.0%: CPT | Performed by: PSYCHIATRY & NEUROLOGY

## 2023-11-06 PROCEDURE — 3074F SYST BP LT 130 MM HG: CPT | Performed by: PSYCHIATRY & NEUROLOGY

## 2023-11-06 PROCEDURE — G8427 DOCREV CUR MEDS BY ELIG CLIN: HCPCS | Performed by: PSYCHIATRY & NEUROLOGY

## 2023-11-06 RX ORDER — GABAPENTIN 100 MG/1
CAPSULE ORAL
Qty: 180 CAPSULE | Refills: 1 | Status: SHIPPED | OUTPATIENT
Start: 2023-11-06 | End: 2024-11-25

## 2023-11-06 RX ORDER — DONEPEZIL HYDROCHLORIDE 10 MG/1
TABLET, FILM COATED ORAL
Qty: 180 TABLET | Refills: 3 | Status: SHIPPED | OUTPATIENT
Start: 2023-11-06

## 2023-11-06 NOTE — PROGRESS NOTES
NEUROLOGY FOLLOW-UP VISIT   Patient Name:       Phoebe Sawant  :        [id number]  Clinic Visit Date:    2023  LOV: 2023      Dear Dr. Scott Holland MD     I saw Mr. Phoebe Sawant in follow-up visit today in continuation of neurologic care. As you know he  is a  66 y.o.  male  with mild cognitive impairment and diabetic neuropathy superimposed on lumbar spondylosis with advanced spondylitic changes, most pronounced at L2-3 and L4-5. He comes to clinic stating that he has increased the dose of gabapentin with some relief in the morning but he is having more trouble with pins-and-needles sensations along with burning pain in evening/bedtime. He also stated that he has been taking donepezil 20 mg nightly without any anticipated adverse effects. Patient feels that he had improvement in memory function. He has been independent of basic ADLs and he has been driving. Patient has been able to carry on basic ADLs such as bathing, dressing and feeding etc.  He is independent of instrumental ADLs such as shopping and housework etc.  He has been driving without any difficulties. Patient wants to optimize the dose of donepezil as he is tolerating it well without any adverse effects. During last visit in ; patient scored 28/30 on MMSE testing. Patient was continued on Aricept 10 mg nightly. He had unremarkable vitamin B12 level and TSH level. Treponema pallidum antibodies negative. Also has back pain with lumbar spondylosis for which he was recommended to continue physical therapy. Also has diabetic neuropathy with abnormal sensory exam and has been on gabapentin 300 bid      All items selected indicate a positive finding. Those items not selected are negative.   Constitutional [] Weight loss/gain   [] Fatigue  [] Fever/Chills   HEENT [] Hearing Loss  [] Visual Disturbance  [] Tinnitus  [] Eye pain   Respiratory [] Shortness of Breath  [] Cough  [] Snoring   Cardiovascular [] Chest

## 2024-05-01 ENCOUNTER — OFFICE VISIT (OUTPATIENT)
Dept: PODIATRY | Age: 79
End: 2024-05-01
Payer: MEDICARE

## 2024-05-01 VITALS — BODY MASS INDEX: 24.34 KG/M2 | HEIGHT: 70 IN | WEIGHT: 170 LBS

## 2024-05-01 DIAGNOSIS — M79.604 PAIN IN BOTH LOWER EXTREMITIES: ICD-10-CM

## 2024-05-01 DIAGNOSIS — B35.1 DERMATOPHYTOSIS OF NAIL: ICD-10-CM

## 2024-05-01 DIAGNOSIS — M79.605 PAIN IN BOTH LOWER EXTREMITIES: ICD-10-CM

## 2024-05-01 DIAGNOSIS — L85.3 XEROSIS CUTIS: ICD-10-CM

## 2024-05-01 DIAGNOSIS — E11.51 TYPE II DIABETES MELLITUS WITH PERIPHERAL CIRCULATORY DISORDER (HCC): Primary | ICD-10-CM

## 2024-05-01 PROCEDURE — 11721 DEBRIDE NAIL 6 OR MORE: CPT | Performed by: PODIATRIST

## 2024-05-01 PROCEDURE — 1123F ACP DISCUSS/DSCN MKR DOCD: CPT | Performed by: PODIATRIST

## 2024-05-01 PROCEDURE — 99213 OFFICE O/P EST LOW 20 MIN: CPT | Performed by: PODIATRIST

## 2024-05-01 PROCEDURE — G8427 DOCREV CUR MEDS BY ELIG CLIN: HCPCS | Performed by: PODIATRIST

## 2024-05-01 PROCEDURE — G8420 CALC BMI NORM PARAMETERS: HCPCS | Performed by: PODIATRIST

## 2024-05-01 PROCEDURE — 1036F TOBACCO NON-USER: CPT | Performed by: PODIATRIST

## 2024-05-01 RX ORDER — AMMONIUM LACTATE 12 G/100G
LOTION TOPICAL
Qty: 225 G | Refills: 2 | Status: SHIPPED | OUTPATIENT
Start: 2024-05-01

## 2024-05-01 RX ORDER — KETOCONAZOLE 20 MG/ML
SHAMPOO TOPICAL
COMMUNITY
Start: 2024-04-10

## 2024-05-01 RX ORDER — LISINOPRIL 10 MG/1
TABLET ORAL
COMMUNITY
Start: 2018-11-10

## 2024-05-01 RX ORDER — PIOGLITAZONEHYDROCHLORIDE 45 MG/1
TABLET ORAL
COMMUNITY
Start: 2018-11-12

## 2024-05-07 DIAGNOSIS — M79.2 NEUROPATHIC PAIN: ICD-10-CM

## 2024-05-07 DIAGNOSIS — E11.42 DIABETIC POLYNEUROPATHY ASSOCIATED WITH TYPE 2 DIABETES MELLITUS (HCC): ICD-10-CM

## 2024-05-07 NOTE — PROGRESS NOTES
CAPSULE IN THE MORNING  AND AT BEDTIME 180 capsule 3    triamcinolone (KENALOG) 0.025 % cream Apply topically 2 times daily. 45 g 1    amLODIPine (NORVASC) 2.5 MG tablet TAKE 1 TABLET DAILY 90 tablet 3    meloxicam (MOBIC) 7.5 MG tablet TAKE 1 TABLET DAILY 90 tablet 3    finasteride (PROSCAR) 5 MG tablet TAKE 1 TABLET DAILY 90 tablet 1    omeprazole (PRILOSEC) 40 MG delayed release capsule TAKE 1 CAPSULE EVERY MORNING BEFORE BREAKFAST 90 capsule 1    sitaGLIPtan-metFORMIN (JANUMET)  MG per tablet Janumet  MG Oral Tablet TAKE 1 TABLET TWICE DAILY WITH MEALS.  Refills: 0 Active 180 tablet 5    simvastatin (ZOCOR) 20 MG tablet TAKE 1 TABLET NIGHTLY 90 tablet 3    ondansetron (ZOFRAN) 4 MG tablet Take 1 tablet by mouth 3 times daily as needed for Nausea or Vomiting 15 tablet 0     No current facility-administered medications on file prior to visit.     Review of Systems.    Review of Systems:   History obtained from chart review and the patient  General ROS: negative for - chills, fatigue, fever, night sweats or weight gain  Constitutional: Negative for chills, diaphoresis, fatigue, fever and unexpected weight change.  Musculoskeletal: Positive for arthralgias, gait problem and joint swelling.  Neurological ROS: negative for - behavioral changes, confusion, headaches or seizures. Negative for weakness and numbness.   Dermatological ROS: negative for - mole changes, rash  Cardiovascular: Negative for leg swelling.   Gastrointestinal: Negative for constipation, diarrhea, nausea and vomiting.          Objective:  Dermatologic Exam:  Skin lesion/ulceration Absent .   Skin No rashes or nodules noted..   Skin is thin, with flaky sloughing skin as well as decreased hair growth to the lower leg  Small red hemosiderin deposits seen dorsal foot   Musculoskeletal:     1st MPJ ROM decreased, Bilateral.  Muscle strength 5/5, Bilateral.  Pain present upon palpation of toenails 1-5, Bilateral. decreased medial

## 2024-05-08 NOTE — TELEPHONE ENCOUNTER
Pharmacy requesting refill of gabapentin.      Medication active on med list yes      Date of last fill: 12/18/23 90d  with 1 refills verified on 5/8/24  verified by KELSEY VALLE      Date of last appointment 11/6/23    Next Visit Date:  7/25/2024

## 2024-05-10 RX ORDER — GABAPENTIN 400 MG/1
CAPSULE ORAL
Qty: 180 CAPSULE | Refills: 5 | Status: SHIPPED | OUTPATIENT
Start: 2024-05-10 | End: 2024-11-08

## 2024-07-03 ENCOUNTER — OFFICE VISIT (OUTPATIENT)
Dept: PODIATRY | Age: 79
End: 2024-07-03
Payer: MEDICARE

## 2024-07-03 VITALS — BODY MASS INDEX: 24.34 KG/M2 | HEIGHT: 70 IN | WEIGHT: 170 LBS

## 2024-07-03 DIAGNOSIS — E11.51 TYPE II DIABETES MELLITUS WITH PERIPHERAL CIRCULATORY DISORDER (HCC): Primary | ICD-10-CM

## 2024-07-03 DIAGNOSIS — M79.605 PAIN IN BOTH LOWER EXTREMITIES: ICD-10-CM

## 2024-07-03 DIAGNOSIS — B35.1 DERMATOPHYTOSIS OF NAIL: ICD-10-CM

## 2024-07-03 DIAGNOSIS — E11.42 DIABETIC POLYNEUROPATHY ASSOCIATED WITH TYPE 2 DIABETES MELLITUS (HCC): ICD-10-CM

## 2024-07-03 DIAGNOSIS — M79.604 PAIN IN BOTH LOWER EXTREMITIES: ICD-10-CM

## 2024-07-03 PROCEDURE — G8427 DOCREV CUR MEDS BY ELIG CLIN: HCPCS | Performed by: PODIATRIST

## 2024-07-03 PROCEDURE — 99213 OFFICE O/P EST LOW 20 MIN: CPT | Performed by: PODIATRIST

## 2024-07-03 PROCEDURE — G8420 CALC BMI NORM PARAMETERS: HCPCS | Performed by: PODIATRIST

## 2024-07-03 PROCEDURE — 1036F TOBACCO NON-USER: CPT | Performed by: PODIATRIST

## 2024-07-03 PROCEDURE — 11721 DEBRIDE NAIL 6 OR MORE: CPT | Performed by: PODIATRIST

## 2024-07-03 PROCEDURE — 1123F ACP DISCUSS/DSCN MKR DOCD: CPT | Performed by: PODIATRIST

## 2024-07-03 RX ORDER — ATORVASTATIN CALCIUM 40 MG/1
40 TABLET, FILM COATED ORAL DAILY
COMMUNITY
Start: 2024-06-24

## 2024-07-10 NOTE — PROGRESS NOTES
[facility] [address]  [address]  [facility] [address]  Dept: [phone]  Dept Fax: [phone]     PAIN PROGRESS NOTE  Date of patient's visit: 7/9/2024  Patient's Name:  Cricket Whitmore YOB: 1945            Patient Care Team:  Jer Cook MD as PCP - General (Family Medicine)  Juliann Tipton DPM as Physician (Podiatry)      Chief Complaint   Patient presents with    Diabetes     Diabetic foot care     Peripheral Neuropathy     Bilateral feet        Subjective:   This Cricket Whitmore comes to clinic for foot and nail care.  Pt currently has complaint of thickened, painful, elongated nails that he/she cannot manage by themselves.  Pt. Relates pain to nails with shoe gear.  Pt's primary care physician is Jer Cook MD .  Pt has a new complaint of increased numbness alisa feet.  Pt has tried changing shoes but it has not helped the pain    Past Medical History:   Diagnosis Date    Depression     Diabetes mellitus (HCC)     Hypertension     Neuropathy        No Known Allergies  Current Outpatient Medications on File Prior to Visit   Medication Sig Dispense Refill    atorvastatin (LIPITOR) 40 MG tablet Take 1 tablet by mouth daily      gabapentin (NEURONTIN) 400 MG capsule TAKE 1 CAPSULE BY MOUTH TWICE  DAILY 180 capsule 5    hydrocortisone 2.5 % cream       ketoconazole (NIZORAL) 2 % shampoo       lisinopril (PRINIVIL;ZESTRIL) 10 MG tablet       pioglitazone (ACTOS) 45 MG tablet       ammonium lactate (LAC-HYDRIN) 12 % lotion Apply topically once daily. 225 g 2    gabapentin (NEURONTIN) 100 MG capsule Take 1-2 tab in addition to evening dose of 400 mg to help for neuropathic pain in feet 180 capsule 1    donepezil (ARICEPT) 10 MG tablet Take two tab every evening with dinner 180 tablet 3    vitamin D (CHOLECALCIFEROL) 25 MCG (1000 UT) TABS tablet Take 2 tablets by mouth daily      glimepiride (AMARYL) 2 MG tablet

## 2024-09-24 DIAGNOSIS — G31.84 MILD COGNITIVE IMPAIRMENT: ICD-10-CM

## 2024-09-25 RX ORDER — DONEPEZIL HYDROCHLORIDE 10 MG/1
TABLET, FILM COATED ORAL
Qty: 180 TABLET | Refills: 1 | Status: SHIPPED | OUTPATIENT
Start: 2024-09-25

## 2024-10-02 ENCOUNTER — OFFICE VISIT (OUTPATIENT)
Dept: PODIATRY | Age: 79
End: 2024-10-02
Payer: MEDICARE

## 2024-10-02 VITALS — HEIGHT: 71 IN | WEIGHT: 169 LBS | BODY MASS INDEX: 23.66 KG/M2

## 2024-10-02 DIAGNOSIS — M79.605 PAIN IN BOTH LOWER EXTREMITIES: ICD-10-CM

## 2024-10-02 DIAGNOSIS — B35.1 DERMATOPHYTOSIS OF NAIL: ICD-10-CM

## 2024-10-02 DIAGNOSIS — L85.3 XEROSIS CUTIS: ICD-10-CM

## 2024-10-02 DIAGNOSIS — E11.42 DIABETIC POLYNEUROPATHY ASSOCIATED WITH TYPE 2 DIABETES MELLITUS (HCC): ICD-10-CM

## 2024-10-02 DIAGNOSIS — M79.604 PAIN IN BOTH LOWER EXTREMITIES: ICD-10-CM

## 2024-10-02 DIAGNOSIS — E11.51 TYPE II DIABETES MELLITUS WITH PERIPHERAL CIRCULATORY DISORDER (HCC): Primary | ICD-10-CM

## 2024-10-02 PROCEDURE — 11721 DEBRIDE NAIL 6 OR MORE: CPT | Performed by: PODIATRIST

## 2024-10-09 NOTE — PROGRESS NOTES
[facility] [address]  [address]  [facility] [address]  Dept: [phone]  Dept Fax: [phone]     PAIN PROGRESS NOTE  Date of patient's visit: 10/9/2024  Patient's Name:  Cricket Whitmore YOB: 1945            Patient Care Team:  Jer Cook MD as PCP - General (Family Medicine)  Juliann Tipton DPM as Physician (Podiatry)      Chief Complaint   Patient presents with    Foot Pain         Subjective:   This Cricket Whitmore comes to clinic for foot and nail care.  Pt currently has complaint of thickened, painful, elongated nails that he/she cannot manage by themselves.  Pt. Relates pain to nails with shoe gear.  Pt's primary care physician is Jer Cook MD .      Past Medical History:   Diagnosis Date    Depression     Diabetes mellitus (HCC)     Hypertension     Neuropathy        No Known Allergies  Current Outpatient Medications on File Prior to Visit   Medication Sig Dispense Refill    donepezil (ARICEPT) 10 MG tablet TAKE 2 TABLETS BY MOUTH EVERY  EVENING WITH DINNER 180 tablet 1    atorvastatin (LIPITOR) 40 MG tablet Take 1 tablet by mouth daily      gabapentin (NEURONTIN) 400 MG capsule TAKE 1 CAPSULE BY MOUTH TWICE  DAILY 180 capsule 5    hydrocortisone 2.5 % cream       ketoconazole (NIZORAL) 2 % shampoo       lisinopril (PRINIVIL;ZESTRIL) 10 MG tablet       pioglitazone (ACTOS) 45 MG tablet       ammonium lactate (LAC-HYDRIN) 12 % lotion Apply topically once daily. 225 g 2    gabapentin (NEURONTIN) 100 MG capsule Take 1-2 tab in addition to evening dose of 400 mg to help for neuropathic pain in feet 180 capsule 1    vitamin D (CHOLECALCIFEROL) 25 MCG (1000 UT) TABS tablet Take 2 tablets by mouth daily      glimepiride (AMARYL) 2 MG tablet       tiZANidine (ZANAFLEX) 2 MG tablet Take one tab every night for muscle spasms 30 tablet 1    tamsulosin (FLOMAX) 0.4 MG capsule TAKE 1 CAPSULE IN THE MORNING  AND AT

## 2024-10-21 DIAGNOSIS — E11.42 DIABETIC POLYNEUROPATHY ASSOCIATED WITH TYPE 2 DIABETES MELLITUS (HCC): ICD-10-CM

## 2024-10-21 DIAGNOSIS — M79.2 NEUROPATHIC PAIN: ICD-10-CM

## 2024-10-22 RX ORDER — GABAPENTIN 400 MG/1
CAPSULE ORAL
Qty: 180 CAPSULE | Refills: 3 | OUTPATIENT
Start: 2024-10-22

## 2025-01-13 ENCOUNTER — OFFICE VISIT (OUTPATIENT)
Dept: PODIATRY | Age: 80
End: 2025-01-13
Payer: MEDICARE

## 2025-01-13 VITALS — HEIGHT: 71 IN | WEIGHT: 165 LBS | BODY MASS INDEX: 23.1 KG/M2

## 2025-01-13 DIAGNOSIS — M79.605 PAIN IN BOTH LOWER EXTREMITIES: ICD-10-CM

## 2025-01-13 DIAGNOSIS — L85.3 XEROSIS CUTIS: ICD-10-CM

## 2025-01-13 DIAGNOSIS — E11.51 TYPE II DIABETES MELLITUS WITH PERIPHERAL CIRCULATORY DISORDER (HCC): Primary | ICD-10-CM

## 2025-01-13 DIAGNOSIS — E11.42 DIABETIC POLYNEUROPATHY ASSOCIATED WITH TYPE 2 DIABETES MELLITUS (HCC): ICD-10-CM

## 2025-01-13 DIAGNOSIS — M79.604 PAIN IN BOTH LOWER EXTREMITIES: ICD-10-CM

## 2025-01-13 DIAGNOSIS — B35.1 DERMATOPHYTOSIS OF NAIL: ICD-10-CM

## 2025-01-13 PROCEDURE — 11721 DEBRIDE NAIL 6 OR MORE: CPT | Performed by: PODIATRIST

## 2025-01-16 ENCOUNTER — OFFICE VISIT (OUTPATIENT)
Dept: NEUROLOGY | Age: 80
End: 2025-01-16
Payer: MEDICARE

## 2025-01-16 VITALS
SYSTOLIC BLOOD PRESSURE: 120 MMHG | BODY MASS INDEX: 21.4 KG/M2 | WEIGHT: 158 LBS | DIASTOLIC BLOOD PRESSURE: 59 MMHG | HEART RATE: 66 BPM | HEIGHT: 72 IN

## 2025-01-16 DIAGNOSIS — E11.42 DIABETIC POLYNEUROPATHY ASSOCIATED WITH TYPE 2 DIABETES MELLITUS (HCC): ICD-10-CM

## 2025-01-16 DIAGNOSIS — F40.240 CLAUSTROPHOBIA: ICD-10-CM

## 2025-01-16 DIAGNOSIS — M62.838 MUSCLE SPASM OF BOTH LOWER LEGS: ICD-10-CM

## 2025-01-16 DIAGNOSIS — M79.2 NEUROPATHIC PAIN: ICD-10-CM

## 2025-01-16 DIAGNOSIS — G31.84 MILD COGNITIVE IMPAIRMENT: Primary | ICD-10-CM

## 2025-01-16 PROCEDURE — 3078F DIAST BP <80 MM HG: CPT | Performed by: PSYCHIATRY & NEUROLOGY

## 2025-01-16 PROCEDURE — 1036F TOBACCO NON-USER: CPT | Performed by: PSYCHIATRY & NEUROLOGY

## 2025-01-16 PROCEDURE — 3074F SYST BP LT 130 MM HG: CPT | Performed by: PSYCHIATRY & NEUROLOGY

## 2025-01-16 PROCEDURE — 99215 OFFICE O/P EST HI 40 MIN: CPT | Performed by: PSYCHIATRY & NEUROLOGY

## 2025-01-16 PROCEDURE — G8420 CALC BMI NORM PARAMETERS: HCPCS | Performed by: PSYCHIATRY & NEUROLOGY

## 2025-01-16 PROCEDURE — G8427 DOCREV CUR MEDS BY ELIG CLIN: HCPCS | Performed by: PSYCHIATRY & NEUROLOGY

## 2025-01-16 PROCEDURE — 1159F MED LIST DOCD IN RCRD: CPT | Performed by: PSYCHIATRY & NEUROLOGY

## 2025-01-16 PROCEDURE — 1123F ACP DISCUSS/DSCN MKR DOCD: CPT | Performed by: PSYCHIATRY & NEUROLOGY

## 2025-01-16 PROCEDURE — 1160F RVW MEDS BY RX/DR IN RCRD: CPT | Performed by: PSYCHIATRY & NEUROLOGY

## 2025-01-16 RX ORDER — EMPAGLIFLOZIN 10 MG/1
1 TABLET, FILM COATED ORAL EVERY MORNING
COMMUNITY
Start: 2024-09-05

## 2025-01-16 RX ORDER — LORAZEPAM 0.5 MG/1
TABLET ORAL
Qty: 2 TABLET | Refills: 0 | Status: SHIPPED | OUTPATIENT
Start: 2025-01-16 | End: 2025-02-24

## 2025-01-16 RX ORDER — TIZANIDINE 2 MG/1
TABLET ORAL
Qty: 30 TABLET | Refills: 5 | Status: SHIPPED | OUTPATIENT
Start: 2025-01-16

## 2025-01-16 RX ORDER — LORAZEPAM 0.5 MG/1
TABLET ORAL
Qty: 3 TABLET | Refills: 0 | Status: SHIPPED | OUTPATIENT
Start: 2025-01-16 | End: 2025-01-16 | Stop reason: SDUPTHER

## 2025-01-16 RX ORDER — GABAPENTIN 600 MG/1
TABLET ORAL
Qty: 60 TABLET | Refills: 5 | Status: SHIPPED | OUTPATIENT
Start: 2025-01-16 | End: 2026-01-26

## 2025-01-16 RX ORDER — FERROUS SULFATE 325(65) MG
325 TABLET ORAL DAILY
COMMUNITY
Start: 2025-01-02

## 2025-01-16 RX ORDER — MEMANTINE HYDROCHLORIDE 5 MG/1
TABLET ORAL
Qty: 60 TABLET | Refills: 5 | Status: SHIPPED | OUTPATIENT
Start: 2025-01-16

## 2025-01-16 RX ORDER — DONEPEZIL HYDROCHLORIDE 10 MG/1
TABLET, FILM COATED ORAL
Qty: 180 TABLET | Refills: 1 | Status: SHIPPED | OUTPATIENT
Start: 2025-01-16

## 2025-01-16 NOTE — PATIENT INSTRUCTIONS
LECANEMAB PATIENT INFORMATION HAND OUT    On July 6, 2023, the Food and Drug Administration (FDA) granted full approval to lecanemab (marketed as Leqembi) to treat people in the earliest symptomatic stages of Alzheimer's disease. This is the first disease-modifying therapy approved for the treatment of Alzheimer's disease in the [place].  Lecanemab is now clinically available for symptomatic patients in the early stages of the disease.    What is lecanemab?  Lecanemab is a monoclonal antibody (a protein that helps your immune system target specific proteins for removal), and it is designed to remove a protein called amyloid beta from the brain. Amyloid beta is an important protein involved in the progression of Alzheimer's disease. Lecanemab is given intravenously (infused through a vein) every 2 weeks. Lecanemab does not cure Alzheimer's disease, but it does modestly slow the rate of progression in the earliest stages of Alzheimer's disease. In a large clinical study, lecanemab slowed the rate of disease progression by about 20-30% after 18 months of treatment in patients with early Alzheimer's symptoms. This effect translated to a roughly 6-month delay in symptom progression after 18 months of consistent treatment.    Who should consider treatment with lecanemab?  Lecanemab should only be considered for use in people who have a biomarker-confirmed diagnosis of Alzheimer's disease in its mildest symptomatic stages. This may include people with symptoms consistent with mild cognitive impairment or the very earliest stages of dementia. There is no evidence that lecanemab is beneficial for people who don't have mild cognitive impairment or mild dementia. Before drugs like lecanemab can be considered for use, a doctor must detect evidence of brain accumulation of Alzheimer's disease proteins with either a lumbar puncture (also known as a spinal tap) or a specialized brain scan called an amyloid beta positron

## 2025-01-16 NOTE — PROGRESS NOTES
confrontation  III,IV,VI -  EOMs full, no afferent defect, no                      MARII, no ptosis  V     -     Normal facial sensation  VII    -     Normal facial symmetry  VIII   -     Intact hearing  IX,X -     Symmetrical palate  XI    -     Symmetrical shoulder shrug  XII   -     Midline tongue, no atrophy    MOTOR FUNCTION:  significant for good strength of grade 5/5 in bilateral proximal and distal muscle groups of both upper and lower extremities with normal bulk, normal tone and no involuntary movements, no tremor   SENSORY FUNCTION:  Impaired LT, PP and temp in distal extremities; lower extremities worse than upper extremities and with decreased vibration in both feet   CEREBELLAR FUNCTION:  Intact fine motor control over upper limbs   REFLEX FUNCTION:  Symmetric, no perverted reflex, no Babinski sign   STATION and GAIT  Normal station, wide based gait; could not do tandem gait; +ve Romberg testing       Diagnostic data reviewed:  Lab Results   Component Value Date    WBC 7.7 03/16/2022    HGB 14.5 03/16/2022    HCT 44.3 03/16/2022    MCV 94.1 03/16/2022     03/16/2022    RBC 4.71 03/16/2022    MCH 30.8 03/16/2022    MCHC 32.7 03/16/2022    RDW 13.2 03/16/2022       Lab Results   Component Value Date     03/16/2022    K 4.5 03/16/2022     03/16/2022    CO2 26 03/16/2022    BUN 19 03/16/2022    CREATININE 0.91 03/16/2022    GLUCOSE 178 08/17/2022    CALCIUM 9.8 03/16/2022    BILITOT 0.37 03/16/2022    ALKPHOS 69 03/16/2022    AST 18 03/16/2022    ALT 18 03/16/2022    LABGLOM >60 03/16/2022    GFRAA >60 03/16/2022       Lab Results   Component Value Date    CHOL 155 03/16/2022    HDL 51 03/16/2022    TRIG 50 03/16/2022    ALT 18 03/16/2022    AST 18 03/16/2022    TSH 1.29 03/16/2022    LABA1C 5.9 02/21/2023    ZLAHNBVV69 483 03/16/2022           Vitamin B12 (3/16/2022): 483  Vitamin D 25 (3/16/2022): 38.1  TSH (3/16/2022): 1.29  Hemoglobin A1c (12/14/2020): 8.3  Hemoglobin A1c (2/21/2023):

## 2025-01-20 NOTE — PROGRESS NOTES
DAILY (Patient not taking: Reported on 1/16/2025) 90 tablet 3    finasteride (PROSCAR) 5 MG tablet TAKE 1 TABLET DAILY 90 tablet 1    omeprazole (PRILOSEC) 40 MG delayed release capsule TAKE 1 CAPSULE EVERY MORNING BEFORE BREAKFAST 90 capsule 1    sitaGLIPtan-metFORMIN (JANUMET)  MG per tablet Janumet  MG Oral Tablet TAKE 1 TABLET TWICE DAILY WITH MEALS.  Refills: 0 Active 180 tablet 5    simvastatin (ZOCOR) 20 MG tablet TAKE 1 TABLET NIGHTLY 90 tablet 3    ondansetron (ZOFRAN) 4 MG tablet Take 1 tablet by mouth 3 times daily as needed for Nausea or Vomiting 15 tablet 0     No current facility-administered medications on file prior to visit.     Review of Systems.    Review of Systems:   History obtained from chart review and the patient  General ROS: negative for - chills, fatigue, fever, night sweats or weight gain  Constitutional: Negative for chills, diaphoresis, fatigue, fever and unexpected weight change.  Musculoskeletal: Positive for arthralgias, gait problem and joint swelling.  Neurological ROS: negative for - behavioral changes, confusion, headaches or seizures. Negative for weakness and numbness.   Dermatological ROS: negative for - mole changes, rash  Cardiovascular: Negative for leg swelling.   Gastrointestinal: Negative for constipation, diarrhea, nausea and vomiting.          Objective:  Dermatologic Exam:  Skin lesion/ulceration Absent .   Skin No rashes or nodules noted..   Skin is thin, with flaky sloughing skin as well as decreased hair growth to the lower leg  Small red hemosiderin deposits seen dorsal foot   Musculoskeletal:     1st MPJ ROM decreased, Bilateral.  Muscle strength 5/5, Bilateral.  Pain present upon palpation of toenails 1-5, Bilateral. decreased medial longitudinal arch, Bilateral.  Ankle ROM decreased,Bilateral.    Dorsally contracted digits present digits 2, Bilateral.     Vascular: DP pulses 1/4 bilateral.  PT pulses 0/4 bilateral.   CFT <5 seconds, Bilateral.

## 2025-06-02 ENCOUNTER — OFFICE VISIT (OUTPATIENT)
Dept: PODIATRY | Age: 80
End: 2025-06-02
Payer: MEDICARE

## 2025-06-02 VITALS — WEIGHT: 158 LBS | HEIGHT: 72 IN | BODY MASS INDEX: 21.4 KG/M2

## 2025-06-02 DIAGNOSIS — E11.51 TYPE II DIABETES MELLITUS WITH PERIPHERAL CIRCULATORY DISORDER (HCC): ICD-10-CM

## 2025-06-02 DIAGNOSIS — M79.604 PAIN IN BOTH LOWER EXTREMITIES: ICD-10-CM

## 2025-06-02 DIAGNOSIS — L85.3 XEROSIS CUTIS: ICD-10-CM

## 2025-06-02 DIAGNOSIS — E11.42 DIABETIC POLYNEUROPATHY ASSOCIATED WITH TYPE 2 DIABETES MELLITUS (HCC): ICD-10-CM

## 2025-06-02 DIAGNOSIS — B35.1 DERMATOPHYTOSIS OF NAIL: Primary | ICD-10-CM

## 2025-06-02 DIAGNOSIS — M79.605 PAIN IN BOTH LOWER EXTREMITIES: ICD-10-CM

## 2025-06-02 PROCEDURE — 1036F TOBACCO NON-USER: CPT | Performed by: PODIATRIST

## 2025-06-02 PROCEDURE — 1126F AMNT PAIN NOTED NONE PRSNT: CPT | Performed by: PODIATRIST

## 2025-06-02 PROCEDURE — 99213 OFFICE O/P EST LOW 20 MIN: CPT | Performed by: PODIATRIST

## 2025-06-02 PROCEDURE — 11721 DEBRIDE NAIL 6 OR MORE: CPT | Performed by: PODIATRIST

## 2025-06-02 PROCEDURE — G8427 DOCREV CUR MEDS BY ELIG CLIN: HCPCS | Performed by: PODIATRIST

## 2025-06-02 PROCEDURE — 1123F ACP DISCUSS/DSCN MKR DOCD: CPT | Performed by: PODIATRIST

## 2025-06-02 PROCEDURE — 1159F MED LIST DOCD IN RCRD: CPT | Performed by: PODIATRIST

## 2025-06-02 PROCEDURE — G8420 CALC BMI NORM PARAMETERS: HCPCS | Performed by: PODIATRIST

## 2025-06-02 NOTE — PROGRESS NOTES
(LIPITOR) 40 MG tablet Take 1 tablet by mouth daily      hydrocortisone 2.5 % cream       ketoconazole (NIZORAL) 2 % shampoo       lisinopril (PRINIVIL;ZESTRIL) 10 MG tablet       pioglitazone (ACTOS) 45 MG tablet       ammonium lactate (LAC-HYDRIN) 12 % lotion Apply topically once daily. 225 g 2    vitamin D (CHOLECALCIFEROL) 25 MCG (1000 UT) TABS tablet Take 2 tablets by mouth daily      glimepiride (AMARYL) 2 MG tablet       tamsulosin (FLOMAX) 0.4 MG capsule TAKE 1 CAPSULE IN THE MORNING  AND AT BEDTIME 180 capsule 3    triamcinolone (KENALOG) 0.025 % cream Apply topically 2 times daily. 45 g 1    amLODIPine (NORVASC) 2.5 MG tablet TAKE 1 TABLET DAILY 90 tablet 3    meloxicam (MOBIC) 7.5 MG tablet TAKE 1 TABLET DAILY 90 tablet 3    finasteride (PROSCAR) 5 MG tablet TAKE 1 TABLET DAILY 90 tablet 1    omeprazole (PRILOSEC) 40 MG delayed release capsule TAKE 1 CAPSULE EVERY MORNING BEFORE BREAKFAST 90 capsule 1    sitaGLIPtan-metFORMIN (JANUMET)  MG per tablet Janumet  MG Oral Tablet TAKE 1 TABLET TWICE DAILY WITH MEALS.  Refills: 0 Active 180 tablet 5    simvastatin (ZOCOR) 20 MG tablet TAKE 1 TABLET NIGHTLY 90 tablet 3    ondansetron (ZOFRAN) 4 MG tablet Take 1 tablet by mouth 3 times daily as needed for Nausea or Vomiting 15 tablet 0     No current facility-administered medications on file prior to visit.       Review of Systems    Review of Systems:   History obtained from chart review and the patient  General ROS: negative for - chills, fatigue, fever, night sweats or weight gain  Constitutional: Negative for chills, diaphoresis, fatigue, fever and unexpected weight change.  Musculoskeletal: Positive for arthralgias, gait problem and joint swelling.  Neurological ROS: negative for - behavioral changes, confusion, headaches or seizures. Negative for weakness and numbness.   Dermatological ROS: negative for - mole changes, rash  Cardiovascular: Negative for leg swelling.   Gastrointestinal:

## 2025-07-14 DIAGNOSIS — G31.84 MILD COGNITIVE IMPAIRMENT: ICD-10-CM

## 2025-07-14 RX ORDER — MEMANTINE HYDROCHLORIDE 5 MG/1
TABLET ORAL
Qty: 60 TABLET | Refills: 5 | Status: SHIPPED | OUTPATIENT
Start: 2025-07-14

## 2025-07-14 NOTE — TELEPHONE ENCOUNTER
Pharmacy requesting refill of memantine 5 mg.      Medication active on med list yes      Date of last Rx: 1/16/25 with 5 refills          verified by JENNIFER, RMA      Date of last appointment 1/16/25    Next Visit Date:  9/23/2025